# Patient Record
Sex: FEMALE | Race: WHITE | NOT HISPANIC OR LATINO | Employment: FULL TIME | URBAN - METROPOLITAN AREA
[De-identification: names, ages, dates, MRNs, and addresses within clinical notes are randomized per-mention and may not be internally consistent; named-entity substitution may affect disease eponyms.]

---

## 2017-02-17 ENCOUNTER — ALLSCRIPTS OFFICE VISIT (OUTPATIENT)
Dept: OTHER | Facility: OTHER | Age: 22
End: 2017-02-17

## 2017-03-01 ENCOUNTER — GENERIC CONVERSION - ENCOUNTER (OUTPATIENT)
Dept: OTHER | Facility: OTHER | Age: 22
End: 2017-03-01

## 2017-12-27 ENCOUNTER — GENERIC CONVERSION - ENCOUNTER (OUTPATIENT)
Dept: OTHER | Facility: OTHER | Age: 22
End: 2017-12-27

## 2018-01-11 NOTE — PROGRESS NOTES
Assessment    1  Encounter for administrative examinations (V68 9) (Z02 9)   2  BMI 33 0-33 9,adult (V85 33) (Z68 33)   3  Birth control counseling (V25 09) (Z30 9)    Plan   BMI 33 0-33 9,adult    · (1) GLUCOSE,  FASTING; Status:Active; Requested for:2016;    · (1) LIPID PANEL, FASTING; Status:Active; Requested for:2016;     Tubersol 5 UNIT/0 1ML Intradermal Solution; INJECT 0 1  ML Intradermal; Dose: 0 1; Route: Intradermal; Site: Left Forearm; Done: [id number] 10:06AM; Status: Complete - Retrospective By Protocol Authorization;  Ordered  For: Encounter for PPD test, Screening examination for pulmonary tuberculosis; Ordered By:Lara Suarez; Effective Date:2016; Administered by: Scott Rodrigues: 2016 10:06:00 AM; Last Updated By: Scott Rodrigues; 2016 2:34:17 PM     Discussion/Summary  health maintenance visit Currently, she eats an adequate diet  Had pap test in  when pregnant which was negative Breast cancer screening: breast cancer screening is not indicated  Colorectal cancer screening: colorectal cancer screening is not indicated  Osteoporosis screening: bone mineral density testing is not indicated  Screening lab work includes glucose and lipid profile  Advice and education were given regarding nutrition, aerobic exercise and weight loss  Work physical- gave PPD test will return on Monday to be checked  Obesity- ordered lipid panel and fasting glucose  Brith control counseling- discussed the different options including OCPs (patient states can't remember to take daily), depo shot and IUDs, provided sheet with different options and will make follow up appointment to discuss which one she chooses        Chief Complaint  PT is here for CPE and to discuss birth control      History of Present Illness  HPI: 23year old obese  with no significant past medical history who presents for work physical exam as 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  She has regular menstrual cycles, usually lasting 5 days  She is not on any contraception and is interested in discussing her options  Review of Systems    Constitutional: no fever and no chills  ENT: no earache  Cardiovascular: no chest pain and no palpitations  Respiratory: no shortness of breath, no cough and no wheezing  Gastrointestinal: no abdominal pain, no nausea, no vomiting and no diarrhea  Genitourinary: no dysuria  Musculoskeletal: no arthralgias and no myalgias  Integumentary: no rashes  Neurological: no headache, no numbness, no tingling and no dizziness  Active Problems    1  Birth control counseling (V25 09) (Z30 9)   2  Drug screening, pre-employment (V70 5) (Z02 1)   3  Encounter for PPD test (V74 1) (Z11 1)   4  Flu vaccine need (V04 81) (Z23)   5   Obesity (278 00) (E66 9)    Past Medical History    · History of 12 weeks gestation of pregnancy (V22 2) (Z3A 12)   · History of Abnormal placenta, antepartum (656 73) (O43 899)   · History of nausea and vomiting (V12 79) (E03 085)   · History of streptococcal pharyngitis (V12 09) (Z87 09)   · History of Need for diphtheria-tetanus-pertussis (Tdap) vaccine, adult/adolescent (V06 1)  (Z23)   · History of Need for influenza vaccination (V04 81) (Z23)   · History of Normal pregnancy (V22 2) (Z34 90)   · History of Postpartum exam (V24 2) (Z39 2)   · History of Pregnancy (V22 2) (Z33 1)   · History of Sore throat (462) (J02 9)   · History of Status post  (V45 89) (Z98 89)   · History of UTI (lower urinary tract infection) (599 0) (N39 0)    Surgical History    · History of  Section Low Transverse    Family History    · Family history of epilepsy (V17 2) (Z82 0)   · Family history of malignant neoplasm (V16 9) (Z80 9)    · Family history of asthma (V17 5) (Z82 5)    · Family history of asthma (V17 5) (Z82 5)    · Family history of epilepsy (V17 2) (Z82 0)   · Family history of malignant neoplasm (V16 9) (Z80 9)    · Family history of epilepsy (V17 2) (Z82 0)   · Family history of malignant neoplasm (V16 9) (Z80 9)    · Family history of epilepsy (V17 2) (Z82 0)    Social History    · Age of onset of sexual intercourse   · age 11   · History of unprotected sex (V79 4) (Z68 48)   · Never a smoker   · Non-smoker (V49 89) (Z78 9)   · Sexual history   · Sexual History: Has a single partner   · Sexually active with members of opposite gender   · Usually uses condoms for sexual activity    Allergies    1  No Known Drug Allergies    2  No Known Environmental Allergies   3  No Known Food Allergies   4  No Known Latex Allergies    Vitals   Recorded: [id number] 09:28AM   Temperature 94 3 F   Heart Rate 97   Respiration 20   Systolic 98   Diastolic 70   Height 5 ft 7 in   Weight 212 lb    BMI Calculated 33 2   BSA Calculated 2 07   O2 Saturation 100     Physical Exam    Constitutional   General appearance: No acute distress, well appearing and well nourished  Head and Face   Head and face: Normal     Eyes   Conjunctiva and lids: No swelling, erythema or discharge  Pupils and irises: Equal, round, reactive to light  Ears, Nose, Mouth, and Throat   External inspection of ears and nose: Normal     Lips, teeth, and gums: Normal, good dentition  Oropharynx: Normal with no erythema, edema, exudate or lesions  Pulmonary   Respiratory effort: No increased work of breathing or signs of respiratory distress  Auscultation of lungs: Clear to auscultation  Cardiovascular   Auscultation of heart: Normal rate and rhythm, normal S1 and S2, no murmurs  Examination of extremities for edema and/or varicosities: Normal     Abdomen   Abdomen: Non-tender, no masses  The abdomen was obese  The abdomen was soft and nontender  The abdomen was not firm and not rigid  No rebound tenderness  No guarding  no CVA tenderness   Liver and spleen: No hepatomegaly or splenomegaly      Skin   Skin and subcutaneous tissue: Normal without rashes or lesions  Psychiatric   Orientation to person, place, and time: Normal     Mood and affect: Normal        Attending Note  Attending Note: Attending Note: I did not interview and examine the patient, I discussed the case with the Resident and reviewed the Resident's note and I agree with the Resident management plan as it was presented to me  Level of Participation: I was present in clinic, but did not examine the patient  I agree with the Resident's note  Future Appointments    Date/Time Provider Specialty Site   04/20/2016 09:30 AM THELMA Boyce  Family Medicine [facility]     Signatures   Electronically signed by : Sukh Guzman DO; Feb 27 2016  6:49PM EST                       (Author)    Electronically signed by : Moraima Tian DO;  Apr 1 2016  9:45AM EST                       (Author)

## 2018-01-12 VITALS
TEMPERATURE: 98.4 F | SYSTOLIC BLOOD PRESSURE: 120 MMHG | RESPIRATION RATE: 18 BRPM | DIASTOLIC BLOOD PRESSURE: 64 MMHG | HEIGHT: 66 IN | BODY MASS INDEX: 39.53 KG/M2 | OXYGEN SATURATION: 96 % | HEART RATE: 80 BPM | WEIGHT: 246 LBS

## 2018-01-13 NOTE — MISCELLANEOUS
Provider Comments  Provider Comments:   L/M for pt to call to R/S missed apt  CE      Signatures   Electronically signed by :  Randi Saeed DO; Mar  1 2017 12:33PM EST                       (Author)

## 2018-01-23 NOTE — MISCELLANEOUS
Message   Recorded as Task   Date: 12/26/2017 09:11 AM, Created By: Georgia Maxwell   Task Name: Medical Complaint Callback   Assigned To: Mitch Rogers   Regarding Patient: Timo Landa, Status: In Progress   Comment:    Adriane Ferrer - 26 Dec 2017 9:11 AM     TASK CREATED  Pt had nexplanon inserted in August 2016, periods stopped in February 2017, got a period in November 2017,  and now has had period for 12 days this month  she would like a call back, she wants to know what is going on   [facility] - 26 Dec 2017 9:11 AM     TASK IN [address] [place] [address] 15 - 27 Dec 2017 10:48 AM     TASK EDITED  called patient she is concerned that she had regular periods for 6 months after nexplanon insertion then it stopped in February 2017, in November 2017 bleeding started again and she had it 3 times in the past 1 and 1/2 months , for the past 12 days she has had spotting which is light and more annoying than anything no cramping and denies any signs of vaginal infection , told her this sounds normal but i would ask you if there are any further advise or if she should be seen thanks! Marti Eastern - 27 Dec 2017 10:49 AM     TASK REASSIGNED: Previously Assigned To Mitch Carmona - 27 Dec 2017 5:12 PM     TASK REPLIED TO: Previously Assigned To Mitch Rogers  Called patient to discuss with her complaints  Patient reports had the Nexplanon inserted in August 2016 and her periods stopped until February 2017 when she got her period  She had for one week and then her bleeding stopped  Patient did not bleed up until November 2017 at which time she had a period for about 12 days  Patient denies any cramping, fevers, vaginal discharge  Patient reports lost her fiance approximately year ago during this time and has some significant life stressors  Patient also asked about cervical malignancy  Patient reports had a Gardasil placed    Discussed with patient her last Pap smear was done in 2014 of May which was normal   Discussed with patient to  schedule for a Pap smear asap  Patient reports her insurance will be effective after January 1, 2018  Discussed with patient to schedule appointment with me  Provided patient with my name and provided her with the phone number  Patient reports will call in the morning to schedule appointment  Discussed with patient that the bleeding is normal   All questions answered       Signatures   Electronically signed by : THELMA Mosquera ; Dec 27 2017  5:12PM EST                       (Author)    Electronically signed by : THELMA Gustafson ; Dec 27 2017  6:58PM EST                       (Co-author)

## 2018-03-19 ENCOUNTER — OFFICE VISIT (OUTPATIENT)
Dept: FAMILY MEDICINE CLINIC | Facility: CLINIC | Age: 23
End: 2018-03-19
Payer: COMMERCIAL

## 2018-03-19 VITALS
DIASTOLIC BLOOD PRESSURE: 70 MMHG | BODY MASS INDEX: 41.81 KG/M2 | WEIGHT: 261 LBS | HEART RATE: 76 BPM | TEMPERATURE: 97.7 F | SYSTOLIC BLOOD PRESSURE: 110 MMHG | RESPIRATION RATE: 16 BRPM

## 2018-03-19 DIAGNOSIS — E66.01 MORBID OBESITY (HCC): Primary | ICD-10-CM

## 2018-03-19 DIAGNOSIS — Z12.4 ENCOUNTER FOR PAPANICOLAOU SMEAR FOR CERVICAL CANCER SCREENING: ICD-10-CM

## 2018-03-19 DIAGNOSIS — Z01.419 VISIT FOR GYNECOLOGIC EXAMINATION: ICD-10-CM

## 2018-03-19 PROCEDURE — 99213 OFFICE O/P EST LOW 20 MIN: CPT | Performed by: FAMILY MEDICINE

## 2018-03-19 NOTE — PROGRESS NOTES
Assessment/Plan:    No problem-specific Assessment & Plan notes found for this encounter  Diagnoses and all orders for this visit:    Morbid obesity ([facility] [address] )    Encounter for Papanicolaou smear for cervical cancer screening  -     Cancel: Pap Lb, Ct-Ng, rfx HPV ASCU  -     Cancel: Genital Comprehensive Culture  -     Cancel: Genital Comprehensive Culture  -     Pap Lb, Ct-Ng, rfx HPV ASCU  -     Genital Comprehensive Culture    Visit for gynecologic examination    Other orders  -     Etonogestrel (Joyice Needle) 76 MG IMPL; Inject under the skin        # encounter for gyn visit with Pap and pelvic  - no physical abnormalities note on examination today   - and breast exam were done today in the office with chaperone which revealed no abnormalities   -also did a Pap smear today and will follow up with results  Discussed with patient will call once the results are available  Also discussed with patient she may come to the office in  a report if she warrants  -counseled patient on safe sex techniques is use condoms each and every time  -patient reports previously has been tested for it STI an HIV which was negative in 2014  Patient does not want to be tested at this time  -discussed with patient should obtain baseline bloodwork by obtaining CMP and fasting lipid panel, patient refuses as there is no family history  Therefore will readdress at next visit    # BMI: >41k/gm2  -morbid obesity   - Counseled patient on lifestyle modifications and moderate intensity activity of at least 150 minutes/week  - discussed decreased consumption of saturated fats, simple sugars and salt  - discussed ensuring adequate amounts of clear fluids, low fat milk products, fruits and vegetables, whole grains, mono and polyunsaturated fats  - discussed decreased consumption of saturated fats, simple sugars and salt     # RTO in one year for annual CPE or PRN for other complaints      Subjective:      Patient ID: Nydia Batista is a 20 y o  female with no significant past medical history who is here today for a gyn exam with Pap and pelvic  Patient reports is doing well with no complaints  Social history:  Patient denies smoking, illicit drug use, social alcohol; 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; 1 cat in home  Gyn history:  Patient reports last menstrual period was 2018 which last for 5 days, patient has Nexplanon implanted in 2016, no bleeding until 2017; irregular bleeding; last pap was done in  May 2014 which was normal as per patient  OB history: ; c/section due to decrease fetal tones; no maternal complications  Sexual history: current active, uses condoms each and every time, prefers heterosexual partners, no hx of STI or HIV  Tetanus shot:   Flu shot: in 2017  HPV shot: completed series while in high school  Family history:  Asthma, seizures, and history of cancer    HPI    The following portions of the patient's history were reviewed and updated as appropriate:   She  has a past medical history of 12 weeks gestation of pregnancy; Abnormal placenta, antepartum; and Status post  section  She   Patient Active Problem List    Diagnosis Date Noted    Visit for gynecologic examination 2018    Morbid obesity ([facility] [address] ) 2017     She  has a past surgical history that includes  section, low transverse (2014)  Her family history includes Asthma in her brother and father; Cancer in her maternal grandmother, mother, and paternal grandmother; Seizures in her family, maternal grandmother, mother, and paternal grandmother  She  reports that she has never smoked  She does not have any smokeless tobacco history on file  She reports that she does not drink alcohol or use drugs    Current Outpatient Prescriptions   Medication Sig Dispense Refill    Etonogestrel (NEXPLANON) 76 MG IMPL Inject under the skin       No current facility-administered medications for this visit  No current outpatient prescriptions on file prior to visit  No current facility-administered medications on file prior to visit  She has No Known Allergies       Review of Systems   Constitutional: Negative for activity change, chills and fever  HENT: Negative for ear pain, nosebleeds, rhinorrhea and sore throat  Eyes: Negative for pain, redness and visual disturbance  Respiratory: Negative for cough, shortness of breath and wheezing  Cardiovascular: Negative for chest pain and leg swelling  Gastrointestinal: Negative for abdominal pain, diarrhea, nausea and vomiting  Endocrine: Negative for polydipsia and polyuria  Genitourinary: Negative for dysuria and hematuria  Musculoskeletal: Negative for joint swelling and myalgias  Skin: Negative for rash and wound  Neurological: Negative for dizziness, weakness, numbness and headaches  Psychiatric/Behavioral: Negative for dysphoric mood, sleep disturbance and suicidal ideas  The patient is not nervous/anxious  Objective:      /70 (BP Location: Left arm, Patient Position: Sitting, Cuff Size: Extra-Large)   Pulse 76   Temp 97 7 °F (36 5 °C) (Tympanic)   Resp 16   Wt 118 kg (261 lb)   LMP 03/12/2018 (Exact Date)   Breastfeeding? No   BMI 41 81 kg/m²          Physical Exam   Constitutional: She is oriented to person, place, and time  She appears well-developed and well-nourished  No distress  HENT:   Head: Normocephalic and atraumatic  Eyes: Right eye exhibits no discharge  Left eye exhibits no discharge  Neck: Neck supple  Cardiovascular: Normal rate, regular rhythm and normal heart sounds  No murmur heard  Pulmonary/Chest: Effort normal and breath sounds normal  No respiratory distress  She has no wheezes  She has no rales  Abdominal: Soft  Bowel sounds are normal  She exhibits no distension  There is no tenderness     Obese, unable to ascertain for masses 2/2 to body habitus Genitourinary: Rectum normal, vagina normal and uterus normal  No breast swelling, tenderness, discharge or bleeding  No labial fusion  There is no rash, tenderness, lesion or injury on the right labia  There is no rash, tenderness, lesion or injury on the left labia  Cervix exhibits no motion tenderness, no discharge and no friability  Right adnexum displays no mass  Left adnexum displays no mass  Neurological: She is alert and oriented to person, place, and time  No cranial nerve deficit  Skin: Skin is dry  No rash noted  She is not diaphoretic  No erythema  No pallor  Psychiatric: She has a normal mood and affect  Her behavior is normal  Judgment and thought content normal    Nursing note and vitals reviewed          Lashawn Rodriguez

## 2018-03-22 LAB
BACTERIA GENITAL AEROBE CULT: NORMAL
Lab: NORMAL

## 2018-03-23 LAB
C TRACH RRNA CVX QL NAA+PROBE: NEGATIVE
CYTOLOGIST CVX/VAG CYTO: ABNORMAL
DX ICD CODE: ABNORMAL
DX ICD CODE: ABNORMAL
HPV I/H RISK 1 DNA CVX QL PROBE+SIG AMP: POSITIVE
N GONORRHOEA RRNA CVX QL NAA+PROBE: NEGATIVE
OTHER STN SPEC: ABNORMAL
OTHER STN SPEC: ABNORMAL
PATH REPORT.FINAL DX SPEC: ABNORMAL
PATHOLOGIST CVX/VAG CYTO: ABNORMAL
RECOM F/U CVX/VAG CYTO: ABNORMAL
SL AMB NOTE:: ABNORMAL
SL AMB SPECIMEN ADEQUACY: ABNORMAL

## 2018-03-26 ENCOUNTER — TELEPHONE (OUTPATIENT)
Dept: FAMILY MEDICINE CLINIC | Facility: CLINIC | Age: 23
End: 2018-03-26

## 2018-03-26 NOTE — TELEPHONE ENCOUNTER
Called patient back as she does had questionsregarding her Pap  Smear results which was discussed earlier this morning with the patient that she will need a repeat Pap smear in one year as it showed ASCUS with positive HPV  Discussed with patient not to be concerned and will do repeat pap in one year  All questions answered

## 2018-05-04 ENCOUNTER — OFFICE VISIT (OUTPATIENT)
Dept: FAMILY MEDICINE CLINIC | Facility: CLINIC | Age: 23
End: 2018-05-04
Payer: COMMERCIAL

## 2018-05-04 VITALS
BODY MASS INDEX: 42.13 KG/M2 | HEART RATE: 97 BPM | RESPIRATION RATE: 16 BRPM | SYSTOLIC BLOOD PRESSURE: 125 MMHG | DIASTOLIC BLOOD PRESSURE: 80 MMHG | OXYGEN SATURATION: 99 % | WEIGHT: 263 LBS | TEMPERATURE: 98.2 F

## 2018-05-04 DIAGNOSIS — F41.9 ANXIETY: Primary | ICD-10-CM

## 2018-05-04 DIAGNOSIS — Z56.6 STRESS AT WORK: ICD-10-CM

## 2018-05-04 PROCEDURE — 99213 OFFICE O/P EST LOW 20 MIN: CPT | Performed by: FAMILY MEDICINE

## 2018-05-04 SDOH — HEALTH STABILITY - MENTAL HEALTH: OTHER PHYSICAL AND MENTAL STRAIN RELATED TO WORK: Z56.6

## 2018-05-04 NOTE — LETTER
May 4, 2018     Patient: Georgi Silver   YOB: 1995   Date of Visit: 5/4/2018       To Whom it May Concern:    Georgi Silver is under my professional care  She was seen in my office on 5/4/2018  In my medical opinion, she should not return to work immediately  She may return to work on 05/21/2018  If you have any questions or concerns, please don't hesitate to call           Sincerely,          Josafat Ariraza MD

## 2018-05-04 NOTE — PROGRESS NOTES
Assessment/Plan:     Diagnoses and all orders for this visit:    Anxiety    Stress at work    BMI 40 0-44 9, adult [facility])        Romayne Milian appears to be experiencing significant stress at her workplace with possible concern for unreasonable work demands and even violation of wage laws  She was very tearful at today's visit  She does enjoy the type of work she does, but is not happy with the workplace demands and environment  I was agreeable to give her two weeks off from work due to the symptoms, stress and anxiety she is currently experiencing with her work situation  Instructed her to use the time off as a opportunity for re-evaluation of her workplace goals and consideration of finding another job, if that would be a better fit  Also recommended establishing care with [facility] for counseling and possible ways to help cope with stressors and management of colleagues at the workplace  Lastly, I advised that she report any violations of wage laws to a higher authority if she is not getting paid for work she is performing  She acknowledged understanding and agreement with the plan  Subjective:      Patient ID: Ritu Rivera is a 20 y o  female  HPI     Romayne Milian is a 20year old female that comes to the office due to concerns of anxiety and stress related at work  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[employer], 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  She reports that she is an hourly employee and is routinely asked to come into work to complete tasks and help cover since they are short-staffed at the current time  When asked if she "punches in" to clock her hours, she stated that she's been told that she will get in trouble if she does  She feels overworked and is having symptoms of anxiety associated with chest tightness and panic episodes due to the work environment and stress placed on her by her superiors   She has tried to request time off and has even approached human resources, but reports that she has been denied and not getting cooperation from them  She also reports difficulty sleeping at night and even has some days where she only gets two hours and has to go into work  Also reports decreased concentration  She enjoys her job, but is stressed with the work situation  She is having difficulty taking care of her young son due to her work demands  She is able to get help from her sister and mother, when they can  Her boyfriend passed away from drug overdose approximately 1 year ago  Denies suicidal or homicidal ideation  States that she's constantly crying because of the work situation  She states that she has 8 weeks of sick and vacation days and has been notified that the only way she get time off is from a letter from a doctor  The following portions of the patient's history were reviewed and updated as appropriate: allergies, current medications, past family history, past medical history, past social history, past surgical history and problem list     Review of Systems   Constitutional: Negative for chills and fever  HENT: Negative for congestion, rhinorrhea and sore throat  Eyes: Negative for visual disturbance  Respiratory: Positive for chest tightness  Negative for cough, shortness of breath and wheezing  Cardiovascular: Positive for palpitations  Negative for chest pain  Gastrointestinal: Negative for abdominal pain, constipation, diarrhea, nausea and vomiting  Neurological: Negative for weakness, numbness and headaches  Psychiatric/Behavioral: Positive for decreased concentration and sleep disturbance  Negative for confusion, self-injury and suicidal ideas  The patient is nervous/anxious  Objective:      /80 (BP Location: Right arm, Patient Position: Sitting, Cuff Size: Extra-Large)   Pulse 97   Temp 98 2 °F (36 8 °C) (Tympanic)   Resp 16   Wt 119 kg (263 lb)   LMP 04/20/2018 (Approximate)   SpO2 99%   Breastfeeding?  No   BMI 42 13 kg/m²          Physical Exam   Constitutional: She is oriented to person, place, and time  She appears well-developed and well-nourished  No distress  Morbidly obese   HENT:   Head: Normocephalic and atraumatic  Right Ear: External ear normal    Left Ear: External ear normal    Eyes: Conjunctivae and EOM are normal  Pupils are equal, round, and reactive to light  Right eye exhibits no discharge  Left eye exhibits no discharge  Neck: Neck supple  Cardiovascular: Normal rate, regular rhythm and normal heart sounds  No murmur heard  Pulmonary/Chest: Effort normal and breath sounds normal  No respiratory distress  She has no wheezes  Abdominal: Soft  Bowel sounds are normal  There is no tenderness  Musculoskeletal: Normal range of motion  She exhibits no edema or tenderness  Neurological: She is alert and oriented to person, place, and time  Skin: Skin is warm and dry  She is not diaphoretic     Psychiatric:   Tearful at today's appointment

## 2018-05-11 ENCOUNTER — HOSPITAL ENCOUNTER (EMERGENCY)
Facility: HOSPITAL | Age: 23
Discharge: HOME | End: 2018-05-11
Attending: EMERGENCY MEDICINE
Payer: COMMERCIAL

## 2018-05-11 VITALS
HEART RATE: 79 BPM | SYSTOLIC BLOOD PRESSURE: 130 MMHG | RESPIRATION RATE: 18 BRPM | TEMPERATURE: 97.1 F | OXYGEN SATURATION: 99 % | DIASTOLIC BLOOD PRESSURE: 79 MMHG

## 2018-05-11 DIAGNOSIS — K04.7 DENTAL ABSCESS: Primary | ICD-10-CM

## 2018-05-11 PROCEDURE — 99282 EMERGENCY DEPT VISIT SF MDM: CPT

## 2018-05-11 RX ORDER — NAPROXEN 500 MG/1
500 TABLET ORAL ONCE
Status: COMPLETED | OUTPATIENT
Start: 2018-05-11 | End: 2018-05-11

## 2018-05-11 RX ORDER — NAPROXEN 500 MG/1
500 TABLET ORAL 2 TIMES DAILY WITH MEALS
Qty: 20 TABLET | Refills: 0 | Status: SHIPPED | OUTPATIENT
Start: 2018-05-11 | End: 2018-06-04

## 2018-05-11 RX ORDER — AMOXICILLIN AND CLAVULANATE POTASSIUM 875; 125 MG/1; MG/1
1 TABLET, FILM COATED ORAL ONCE
Status: COMPLETED | OUTPATIENT
Start: 2018-05-11 | End: 2018-05-11

## 2018-05-11 RX ORDER — AMOXICILLIN AND CLAVULANATE POTASSIUM 875; 125 MG/1; MG/1
1 TABLET, FILM COATED ORAL EVERY 12 HOURS
Qty: 14 TABLET | Refills: 0 | Status: SHIPPED | OUTPATIENT
Start: 2018-05-11 | End: 2018-05-18

## 2018-05-11 RX ADMIN — NAPROXEN 500 MG: 500 TABLET ORAL at 13:16

## 2018-05-11 RX ADMIN — AMOXICILLIN AND CLAVULANATE POTASSIUM 1 TABLET: 875; 125 TABLET, FILM COATED ORAL at 13:16

## 2018-05-11 NOTE — DISCHARGE INSTRUCTIONS
Please take a list of all of your medications and discharge paperwork with you to all of your follow-up medical visits  Please take all of your medications as directed  Please call your family doctor or return to the ER if you have increased shortness of breath, chest pain, fevers, chills, nausea, vomiting, diarrhea, or any other worsening symptoms  Dental Abscess   WHAT YOU NEED TO KNOW:   A dental abscess is a collection of pus in or around a tooth  A dental abscess is caused by bacteria  The bacteria usually enter the tooth when the enamel (outer part of the tooth) is damaged by tooth decay  Bacteria may also enter the tooth through a break or chip in the tooth, or a cut in the gum  Food particles that are stuck between the teeth for a long time may also lead to an abscess  DISCHARGE INSTRUCTIONS:   Return to the emergency department if:   · You have severe pain  · You have trouble breathing because of pain or swelling  Contact your healthcare provider if:   · Your symptoms get worse, even after treatment  · Your mouth is bleeding  · You cannot eat or drink because of pain or swelling  · Your abscess returns  · You have an injury that causes a crack in your tooth  · You have questions or concerns about your condition or care  Medicines: You may  need any of the following:  · Antibiotics  help treat a bacterial infection  · NSAIDs , such as ibuprofen, help decrease swelling, pain, and fever  This medicine is available with or without a doctor's order  NSAIDs can cause stomach bleeding or kidney problems in certain people  If you take blood thinner medicine, always ask your healthcare provider if NSAIDs are safe for you  Always read the medicine label and follow directions  · Acetaminophen  decreases pain and fever  It is available without a doctor's order  Ask how much to take and how often to take it  Follow directions   Read the labels of all other medicines you are using to see if they also contain acetaminophen, or ask your doctor or pharmacist  Acetaminophen can cause liver damage if not taken correctly  Do not use more than 4 grams (4,000 milligrams) total of acetaminophen in one day  · Prescription pain medicine  may be given  Ask your healthcare provider how to take this medicine safely  Some prescription pain medicines contain acetaminophen  Do not take other medicines that contain acetaminophen without talking to your healthcare provider  Too much acetaminophen may cause liver damage  Prescription pain medicine may cause constipation  Ask your healthcare provider how to prevent or treat constipation  · Take your medicine as directed  Contact your healthcare provider if you think your medicine is not helping or if you have side effects  Tell him of her if you are allergic to any medicine  Keep a list of the medicines, vitamins, and herbs you take  Include the amounts, and when and why you take them  Bring the list or the pill bottles to follow-up visits  Carry your medicine list with you in case of an emergency  Self-care:   · Rinse your mouth every 2 hours with salt water  This will help keep the area clean  · Gently brush your teeth twice a day with a soft tooth brush  This will help keep the area clean  · Eat soft foods as directed  Soft foods may cause less pain  Examples include applesauce, yogurt, and cooked pasta  Ask your healthcare provider how long to follow this instruction  · Apply a warm compress to your tooth or gum  Use a cotton ball or gauze soaked in warm water  Remove the compress in 10 minutes or when it becomes cool  Repeat 3 times a day  Prevent another abscess:   · Brush your teeth at least 2 times a day with fluoride toothpaste  · Use dental floss to clean between your teeth at least once a day  · Rinse your mouth with water or mouthwash after meals and snacks  · Chew sugarless gum after meals and snacks      · Limit foods that are sticky and high in sugar such as raisons  Also limit drinks high in sugar, such as soda  · See your dentist every 6 months for dental cleanings and oral exams  Follow up with your healthcare provider in 24 hours: Your healthcare provider will need to check your teeth and gums  Write down your questions so you remember to ask them during your visits  © 2017 [employer] Information is for End User's use only and may not be sold, redistributed or otherwise used for commercial purposes  All illustrations and images included in CareNotes® are the copyrighted property of [employer]  or [employer]  The above information is an  only  It is not intended as medical advice for individual conditions or treatments  Talk to your doctor, nurse or pharmacist before following any medical regimen to see if it is safe and effective for you

## 2018-05-11 NOTE — ED PROVIDER NOTES
History  Chief Complaint   Patient presents with    Dental Pain     pt present to the ed with dental pain in her left lower wisdom tooth  pt states that she has a dentist apointment coming up soon      25-year-old female presents to the ER with complaint of left lower jaw pain for the past 3-4 days  Patient states that she had a broken molar to that region which now she thinks is infected  Patient denies any fevers, chills, nausea, vomiting, diarrhea, dysuria, facial swelling  Patient is taking Tylenol for pain which is not helping  Patient does have an appointment with her dentist next week  Patient called her dentist today and was told to come to the ER for further evaluation  History provided by:  Patient  Dental Pain   Associated symptoms: no congestion, no fever and no headaches        Prior to Admission Medications   Prescriptions Last Dose Informant Patient Reported? Taking? Etonogestrel (NEXPLANON) 76 MG IMPL   Yes No   Sig: Inject under the skin      Facility-Administered Medications: None       Past Medical History:   Diagnosis Date    12 weeks gestation of pregnancy     RESOLVED: 1/8/15    Abnormal placenta, antepartum     RESOLVED: 14    Status post  section     RESOLVED: 16       Past Surgical History:   Procedure Laterality Date     SECTION, LOW TRANSVERSE  2014    LAST ASSESSED: 1/8/15       Family History   Problem Relation Age of Onset    Seizures Mother      EPILEPSY    Cancer Mother     Asthma Father     Asthma Brother     Seizures Maternal Grandmother      EPILEPSY    Cancer Maternal Grandmother     Seizures Paternal Grandmother      EPILEPSY    Cancer Paternal Grandmother     Seizures Family      EPILEPSY     I have reviewed and agree with the history as documented      Social History   Substance Use Topics    Smoking status: Never Smoker    Smokeless tobacco: Not on file    Alcohol use No        Review of Systems   Constitutional: Negative for activity change, appetite change, chills and fever  HENT: Positive for dental problem  Negative for congestion and ear pain  Eyes: Negative for pain and discharge  Respiratory: Negative for cough, chest tightness, shortness of breath, wheezing and stridor  Cardiovascular: Negative for chest pain and palpitations  Gastrointestinal: Negative for abdominal distention, abdominal pain, constipation, diarrhea and nausea  Endocrine: Negative for cold intolerance  Genitourinary: Negative for dysuria, frequency and urgency  Musculoskeletal: Negative for arthralgias and back pain  Skin: Negative for color change and rash  Allergic/Immunologic: Negative for environmental allergies and food allergies  Neurological: Negative for dizziness, weakness, numbness and headaches  Hematological: Negative for adenopathy  Psychiatric/Behavioral: Negative for agitation, behavioral problems and confusion  The patient is not nervous/anxious  All other systems reviewed and are negative  Physical Exam  ED Triage Vitals [05/11/18 1234]   Temperature Pulse Respirations Blood Pressure SpO2   (!) 97 1 °F (36 2 °C) 79 18 130/79 99 %      Temp Source Heart Rate Source Patient Position - Orthostatic VS BP Location FiO2 (%)   Tympanic Monitor -- -- --      Pain Score       --           Orthostatic Vital Signs  Vitals:    05/11/18 1234   BP: 130/79   Pulse: 79       Physical Exam   Constitutional: She is oriented to person, place, and time  She appears well-developed and well-nourished  HENT:   Head: Normocephalic and atraumatic  Mouth/Throat: Oropharynx is clear and moist    Right lower 3rd molar appears to be broken  Mild swelling and induration noted to the gum  Eyes: Conjunctivae and EOM are normal    Neck: Normal range of motion  Neck supple  Cardiovascular: Normal rate, regular rhythm, normal heart sounds and intact distal pulses      Pulmonary/Chest: Effort normal and breath sounds normal  Abdominal: Soft  Bowel sounds are normal  She exhibits no distension  There is no tenderness  Musculoskeletal: Normal range of motion  Neurological: She is alert and oriented to person, place, and time  Skin: Skin is warm and dry  Psychiatric: She has a normal mood and affect  Her behavior is normal  Judgment and thought content normal    Nursing note and vitals reviewed  ED Medications  Medications   amoxicillin-clavulanate (AUGMENTIN) 875-125 mg per tablet 1 tablet (not administered)   naproxen (NAPROSYN) tablet 500 mg (not administered)       Diagnostic Studies  Results Reviewed     None                 No orders to display              Procedures  Procedures       Phone Contacts  ED Phone Contact    ED Course                               MDM  Number of Diagnoses or Management Options  Dental abscess:   Risk of Complications, Morbidity, and/or Mortality  General comments: Patient presenting with dental abscess  18 gauge needle was used to puncture the indurated region of the gum at the site of right lower 3rd molar area with only blood noted  Patient is subsequently placed on Augmentin and p r n  NSAIDs with follow-up to dentist as scheduled  Close return instructions given to return to the ER for any worsening symptoms  Patient agrees with discharge plan  Patient well appearing at time of discharge  Patient Progress  Patient progress: stable    CritCare Time    Disposition  Final diagnoses:   Dental abscess     Time reflects when diagnosis was documented in both MDM as applicable and the Disposition within this note     Time User Action Codes Description Comment    5/11/2018 12:40 PM Russ Driver Add [K04 7] Dental abscess       ED Disposition     ED Disposition Condition Comment    Discharge  Georgi Silver discharge to home/self care      Condition at discharge: Good        Follow-up Information     Follow up With Specialties Details Why Contact Info        Follow-up with her dentist as scheduled next week  Patient's Medications   Discharge Prescriptions    AMOXICILLIN-CLAVULANATE (AUGMENTIN) 875-125 MG PER TABLET    Take 1 tablet by mouth every 12 (twelve) hours for 7 days       Start Date: 5/11/2018 End Date: 5/18/2018       Order Dose: 1 tablet       Quantity: 14 tablet    Refills: 0    NAPROXEN (EC NAPROSYN) 500 MG EC TABLET    Take 1 tablet (500 mg total) by mouth 2 (two) times a day with meals       Start Date: 5/11/2018 End Date: --       Order Dose: 500 mg       Quantity: 20 tablet    Refills: 0     No discharge procedures on file      ED Provider  Electronically Signed by           Sandy Barriga DO  05/11/18 7199

## 2018-06-04 ENCOUNTER — HOSPITAL ENCOUNTER (EMERGENCY)
Facility: HOSPITAL | Age: 23
Discharge: HOME | End: 2018-06-04
Attending: EMERGENCY MEDICINE | Admitting: EMERGENCY MEDICINE
Payer: COMMERCIAL

## 2018-06-04 ENCOUNTER — TELEPHONE (OUTPATIENT)
Dept: FAMILY MEDICINE CLINIC | Facility: CLINIC | Age: 23
End: 2018-06-04

## 2018-06-04 VITALS
WEIGHT: 265 LBS | HEART RATE: 89 BPM | RESPIRATION RATE: 18 BRPM | BODY MASS INDEX: 42.45 KG/M2 | SYSTOLIC BLOOD PRESSURE: 111 MMHG | OXYGEN SATURATION: 100 % | DIASTOLIC BLOOD PRESSURE: 68 MMHG | TEMPERATURE: 97.1 F

## 2018-06-04 DIAGNOSIS — R55 VASOVAGAL SYNCOPE: Primary | ICD-10-CM

## 2018-06-04 LAB
ANION GAP SERPL CALCULATED.3IONS-SCNC: 10 MMOL/L (ref 4–13)
BASOPHILS # BLD AUTO: 0.03 THOUSANDS/ΜL (ref 0–0.1)
BASOPHILS NFR BLD AUTO: 0 % (ref 0–1)
BUN SERPL-MCNC: 9 MG/DL (ref 5–25)
CALCIUM SERPL-MCNC: 9 MG/DL (ref 8.3–10.1)
CHLORIDE SERPL-SCNC: 105 MMOL/L (ref 100–108)
CO2 SERPL-SCNC: 23 MMOL/L (ref 21–32)
CREAT SERPL-MCNC: 0.7 MG/DL (ref 0.6–1.3)
EOSINOPHIL # BLD AUTO: 0.11 THOUSAND/ΜL (ref 0–0.61)
EOSINOPHIL NFR BLD AUTO: 1 % (ref 0–6)
ERYTHROCYTE [DISTWIDTH] IN BLOOD BY AUTOMATED COUNT: 12.5 % (ref 11.6–15.1)
GFR SERPL CREATININE-BSD FRML MDRD: 123 ML/MIN/1.73SQ M
GLUCOSE SERPL-MCNC: 82 MG/DL (ref 65–140)
HCT VFR BLD AUTO: 47.6 % (ref 34.8–46.1)
HGB BLD-MCNC: 14.6 G/DL (ref 11.5–15.4)
IMM GRANULOCYTES # BLD AUTO: 0.02 THOUSAND/UL (ref 0–0.2)
IMM GRANULOCYTES NFR BLD AUTO: 0 % (ref 0–2)
LYMPHOCYTES # BLD AUTO: 1.61 THOUSANDS/ΜL (ref 0.6–4.47)
LYMPHOCYTES NFR BLD AUTO: 16 % (ref 14–44)
MCH RBC QN AUTO: 27 PG (ref 26.8–34.3)
MCHC RBC AUTO-ENTMCNC: 30.7 G/DL (ref 31.4–37.4)
MCV RBC AUTO: 88 FL (ref 82–98)
MONOCYTES # BLD AUTO: 0.63 THOUSAND/ΜL (ref 0.17–1.22)
MONOCYTES NFR BLD AUTO: 6 % (ref 4–12)
NEUTROPHILS # BLD AUTO: 7.66 THOUSANDS/ΜL (ref 1.85–7.62)
NEUTS SEG NFR BLD AUTO: 77 % (ref 43–75)
NRBC BLD AUTO-RTO: 0 /100 WBCS
PLATELET # BLD AUTO: 228 THOUSANDS/UL (ref 149–390)
PMV BLD AUTO: 10.3 FL (ref 8.9–12.7)
POTASSIUM SERPL-SCNC: 3.8 MMOL/L (ref 3.5–5.3)
RBC # BLD AUTO: 5.41 MILLION/UL (ref 3.81–5.12)
SODIUM SERPL-SCNC: 138 MMOL/L (ref 136–145)
TROPONIN I SERPL-MCNC: <0.02 NG/ML
WBC # BLD AUTO: 10.06 THOUSAND/UL (ref 4.31–10.16)

## 2018-06-04 PROCEDURE — 80048 BASIC METABOLIC PNL TOTAL CA: CPT | Performed by: EMERGENCY MEDICINE

## 2018-06-04 PROCEDURE — 85025 COMPLETE CBC W/AUTO DIFF WBC: CPT | Performed by: EMERGENCY MEDICINE

## 2018-06-04 PROCEDURE — 93005 ELECTROCARDIOGRAM TRACING: CPT

## 2018-06-04 PROCEDURE — 36415 COLL VENOUS BLD VENIPUNCTURE: CPT | Performed by: EMERGENCY MEDICINE

## 2018-06-04 PROCEDURE — 99284 EMERGENCY DEPT VISIT MOD MDM: CPT

## 2018-06-04 PROCEDURE — 84484 ASSAY OF TROPONIN QUANT: CPT | Performed by: EMERGENCY MEDICINE

## 2018-06-04 NOTE — ED PROVIDER NOTES
History  Chief Complaint   Patient presents with    Syncope     States she was watching TV , got up to answer the door and passed out  Denies injury, does not know how long she was passed out  States her head and chest " just feel weird "      Hx SYNCOPAL EPISODES MANY YRS AGO  HAD SYNCOPAL EPISODE TODAY, POSS LOC X 8 MIN THIS AM  NEG NEURO AND PHYSICAL EXAM, NO SIGNS OF TRAUMA        Syncope   Episode history:  Single  Most recent episode: Today  Duration:  8 minutes  Progression:  Resolved  Chronicity:  Recurrent  Context: normal activity    Witnessed: no    Relieved by:  None tried  Worsened by:  Nothing  Ineffective treatments:  None tried      Prior to Admission Medications   Prescriptions Last Dose Informant Patient Reported? Taking? Etonogestrel (NEXPLANON) 76 MG IMPL More than a month at Unknown time  Yes No   Sig: Inject under the skin      Facility-Administered Medications: None       Past Medical History:   Diagnosis Date    12 weeks gestation of pregnancy     RESOLVED: 1/8/15    Abnormal placenta, antepartum     RESOLVED: 14    Status post  section     RESOLVED: 16       Past Surgical History:   Procedure Laterality Date     SECTION, LOW TRANSVERSE  2014    LAST ASSESSED: 1/8/15       Family History   Problem Relation Age of Onset    Seizures Mother      EPILEPSY    Cancer Mother     Asthma Father     Asthma Brother     Seizures Maternal Grandmother      EPILEPSY    Cancer Maternal Grandmother     Seizures Paternal Grandmother      EPILEPSY    Cancer Paternal Grandmother     Seizures Family      EPILEPSY     I have reviewed and agree with the history as documented  Social History   Substance Use Topics    Smoking status: Never Smoker    Smokeless tobacco: Never Used    Alcohol use No        Review of Systems   Cardiovascular: Positive for syncope  All other systems reviewed and are negative        Physical Exam  Physical Exam   Constitutional: She is oriented to person, place, and time  She appears well-developed and well-nourished  No distress  HENT:   Head: Normocephalic and atraumatic  Eyes: Conjunctivae and EOM are normal  Pupils are equal, round, and reactive to light  Neck: Normal range of motion  Neck supple  Cardiovascular: Normal rate and regular rhythm  Pulmonary/Chest: Effort normal and breath sounds normal    Abdominal: Soft  There is no tenderness  Musculoskeletal: Normal range of motion  She exhibits no edema, tenderness or deformity  Neurological: She is alert and oriented to person, place, and time  No cranial nerve deficit  She exhibits normal muscle tone  Coordination normal    Skin: Skin is warm and dry  No rash noted  She is not diaphoretic  Nursing note and vitals reviewed        Vital Signs  ED Triage Vitals [06/04/18 1143]   Temperature Pulse Respirations Blood Pressure SpO2   (!) 97 1 °F (36 2 °C) 89 18 111/68 100 %      Temp Source Heart Rate Source Patient Position - Orthostatic VS BP Location FiO2 (%)   Tympanic Monitor Sitting Left arm --      Pain Score       No Pain           Vitals:    06/04/18 1143   BP: 111/68   Pulse: 89   Patient Position - Orthostatic VS: Sitting       Visual Acuity  Visual Acuity      Most Recent Value   L Pupil Size (mm)  5   R Pupil Size (mm)  5          ED Medications  Medications - No data to display    Diagnostic Studies  Results Reviewed     Procedure Component Value Units Date/Time    Troponin I [[id number]]  (Normal) Collected:  06/04/18 1301    Lab Status:  Final result Specimen:  Blood from Arm, Right Updated:  06/04/18 1337     Troponin I <0 02 ng/mL     Narrative:         [employer] Chemistry analyzer 99% cutoff is > 0 04 ng/mL in network labs    o cTnI 99% cutoff is useful only when applied to patients in the clinical setting of myocardial ischemia  o cTnI 99% cutoff should be interpreted in the context of clinical history, ECG findings and possibly cardiac imaging to establish correct diagnosis  o cTnI 99% cutoff may be suggestive but clearly not indicative of a coronary event without the clinical setting of myocardial ischemia  Basic metabolic panel [[id number]] Collected:  06/04/18 1301    Lab Status:  Final result Specimen:  Blood from Arm, Right Updated:  06/04/18 1319     Sodium 138 mmol/L      Potassium 3 8 mmol/L      Chloride 105 mmol/L      CO2 23 mmol/L      Anion Gap 10 mmol/L      BUN 9 mg/dL      Creatinine 0 70 mg/dL      Glucose 82 mg/dL      Calcium 9 0 mg/dL      eGFR 123 ml/min/1 73sq m     Narrative:         National Kidney Disease Education Program recommendations are as follows:  GFR calculation is accurate only with a steady state creatinine  Chronic Kidney disease less than 60 ml/min/1 73 sq  meters  Kidney failure less than 15 ml/min/1 73 sq  meters      CBC and differential [[id number]]  (Abnormal) Collected:  06/04/18 1301    Lab Status:  Final result Specimen:  Blood from Arm, Right Updated:  06/04/18 1307     WBC 10 06 Thousand/uL      RBC 5 41 (H) Million/uL      Hemoglobin 14 6 g/dL      Hematocrit 47 6 (H) %      MCV 88 fL      MCH 27 0 pg      MCHC 30 7 (L) g/dL      RDW 12 5 %      MPV 10 3 fL      Platelets 995 Thousands/uL      nRBC 0 /100 WBCs      Neutrophils Relative 77 (H) %      Immat GRANS % 0 %      Lymphocytes Relative 16 %      Monocytes Relative 6 %      Eosinophils Relative 1 %      Basophils Relative 0 %      Neutrophils Absolute 7 66 (H) Thousands/µL      Immature Grans Absolute 0 02 Thousand/uL      Lymphocytes Absolute 1 61 Thousands/µL      Monocytes Absolute 0 63 Thousand/µL      Eosinophils Absolute 0 11 Thousand/µL      Basophils Absolute 0 03 Thousands/µL                  No orders to display              Procedures  ECG 12 Lead Documentation  Date/Time: 6/4/2018 1:13 PM  Performed by: Erick Lindesy  Authorized by: Erick Lindsey     ECG reviewed by me, the ED Provider: yes    Patient location:  ED  Interpretation: Interpretation: normal    Rate:     ECG rate:  81    ECG rate assessment: normal    Rhythm:     Rhythm: sinus rhythm    Ectopy:     Ectopy: none    QRS:     QRS axis:  Normal    QRS intervals:  Normal  Conduction:     Conduction: normal    ST segments:     ST segments:  Normal  T waves:     T waves: normal             Phone Contacts  ED Phone Contact    ED Course                               MDM  Number of Diagnoses or Management Options  Diagnosis management comments: NEG W/UP, LIKELY VASOVAGAL, WILL F/UP W/ CARD    CritCare Time    Disposition  Final diagnoses:   Vasovagal syncope     Time reflects when diagnosis was documented in both MDM as applicable and the Disposition within this note     Time User Action Codes Description Comment    6/4/2018  2:05 PM Jason Parham Add [R55] Vasovagal syncope       ED Disposition     ED Disposition Condition Comment    Discharge  Maycol Romero discharge to home/self care  Condition at discharge: Stable        Follow-up Information     Follow up With Specialties Details Why Contact Info    Shravan De Leon MD Cardiology Schedule an appointment as soon as possible for a visit in 2 days  [facility]  [phone]            Patient's Medications   Discharge Prescriptions    No medications on file     No discharge procedures on file      ED Provider  Electronically Signed by           Madhuri Contreras MD  06/04/18 6636

## 2018-06-04 NOTE — DISCHARGE INSTRUCTIONS
Syncope   WHAT YOU NEED TO KNOW:   Syncope is also called fainting or passing out  Syncope is a sudden, temporary loss of consciousness, followed by a fall from a standing or sitting position  Syncope ranges from not serious to a sign of a more serious condition that needs to be treated  You can control some health conditions that cause syncope  Your healthcare providers can help you create a plan to manage syncope and prevent episodes  DISCHARGE INSTRUCTIONS:   Seek care immediately if:   · You are bleeding because you hit your head when you fainted  · You suddenly have double vision, difficulty speaking, numbness, and cannot move your arms or legs  · You have chest pain and trouble breathing  · You vomit blood or material that looks like coffee grounds  · You see blood in your bowel movement  Contact your healthcare provider if:   · You have new or worsening symptoms  · You have another syncope episode  · You have a headache, fast heartbeat, or feel too dizzy to stand up  · You have questions or concerns about your condition or care  Follow up with your healthcare provider as directed:  Write down your questions so you remember to ask them during your visits  Manage syncope:   · Keep a record of your syncope episodes  Include your symptoms and your activity before and after the episode  The record can help your healthcare provider find the cause of your syncope and help you manage episodes  · Sit or lie down when needed  This includes when you feel dizzy, your throat is getting tight, and your vision changes  Raise your legs above the level of your heart  · Take slow, deep breaths if you start to breathe faster with anxiety or fear  This can help decrease dizziness and the feeling that you might faint  · Check your blood pressure often  This is important if you take medicine to lower your blood pressure   Check your blood pressure when you are lying down and when you are standing  Ask how often to check during the day  Keep a record of your blood pressure numbers  Your healthcare provider may use the record to help plan your treatment  Prevent a syncope episode:   · Move slowly and let yourself get used to one position before you move to another position  This is very important when you change from a lying or sitting position to a standing position  Take some deep breaths before you stand up from a lying position  Stand up slowly  Sudden movements may cause a fainting spell  Sit on the side of the bed or couch for a few minutes before you stand up  If you are on bedrest, try to be upright for about 2 hours each day, or as directed  Do not lock your legs if you are standing for a long period of time  Move your legs and bend your knees to keep blood flowing  · Follow your healthcare provider's recommendations  Your provider may  recommend that you drink more liquids to prevent dehydration  You may also need to have more salt to keep your blood pressure from dropping too low and causing syncope  Your provider will tell you how much liquid and sodium to have each day  · Watch for signs of low blood sugar  These include hunger, nervousness, sweating, and fast or fluttery heartbeats  Talk with your healthcare provider about ways to keep your blood sugar level steady  · Do not strain if you are constipated  You may faint if you strain to have a bowel movement  Walking is the best way to get your bowels moving  Eat foods high in fiber to make it easier to have a bowel movement  Good examples are high-fiber cereals, beans, vegetables, and whole-grain breads  Prune juice may help make bowel movements softer  · Be careful in hot weather  Heat can cause a syncope episode  Limit activity done outside on hot days  Physical activity in hot weather can lead to dehydration  This can cause an episode    © 2017 [employer] Information is for End User's use only and may not be sold, redistributed or otherwise used for commercial purposes  All illustrations and images included in CareNotes® are the copyrighted property of [employer]  or [employer]  The above information is an  only  It is not intended as medical advice for individual conditions or treatments  Talk to your doctor, nurse or pharmacist before following any medical regimen to see if it is safe and effective for you

## 2018-06-04 NOTE — TELEPHONE ENCOUNTER
PT WAS TREATED AT THE ER ,WILL NEED NOTE TO GO BACK TO WORK,HAS APPT ON WED  ASKS IF WE WILL GIVE HER AN AEXTRA DAY  PLEASE CALL PT

## 2018-06-04 NOTE — LETTER
June 4, 2018     Patient: Mac Wynn   YOB: 1995   Date of Visit: 6/4/2018       To Whom it May Concern:    Mac Wynn is under my professional care  She will be seen for ER follow up on 6/6/18 please excuse from work until further evaluation  If you have any questions or concerns, please don't hesitate to call  Sincerely,    L  Jonna Giron / FREEDOM Wilde MD         CC: No Recipients

## 2018-06-05 LAB
ATRIAL RATE: 81 BPM
P AXIS: 42 DEGREES
PR INTERVAL: 166 MS
QRS AXIS: 44 DEGREES
QRSD INTERVAL: 98 MS
QT INTERVAL: 384 MS
QTC INTERVAL: 446 MS
T WAVE AXIS: 40 DEGREES
VENTRICULAR RATE: 81 BPM

## 2018-06-05 PROCEDURE — 93010 ELECTROCARDIOGRAM REPORT: CPT | Performed by: INTERNAL MEDICINE

## 2018-06-06 ENCOUNTER — OFFICE VISIT (OUTPATIENT)
Dept: FAMILY MEDICINE CLINIC | Facility: CLINIC | Age: 23
End: 2018-06-06
Payer: COMMERCIAL

## 2018-06-06 VITALS
TEMPERATURE: 97.8 F | BODY MASS INDEX: 43.25 KG/M2 | OXYGEN SATURATION: 95 % | WEIGHT: 270 LBS | SYSTOLIC BLOOD PRESSURE: 110 MMHG | RESPIRATION RATE: 16 BRPM | DIASTOLIC BLOOD PRESSURE: 70 MMHG | HEART RATE: 93 BPM

## 2018-06-06 DIAGNOSIS — Z09 FOLLOW UP: ICD-10-CM

## 2018-06-06 DIAGNOSIS — R55 SYNCOPE, UNSPECIFIED SYNCOPE TYPE: Primary | ICD-10-CM

## 2018-06-06 PROCEDURE — 99213 OFFICE O/P EST LOW 20 MIN: CPT | Performed by: FAMILY MEDICINE

## 2018-06-06 NOTE — LETTER
June 6, 2018     Patient: Maria Luisa Davila   YOB: 1995   Date of Visit: 6/6/2018       To Whom it May Concern:    Maria Luisa Davila is under my professional care  She was seen in my office on 6/6/2018  She may return to work on 6/7/18  If you have any questions or concerns, please don't hesitate to call           Sincerely,          Mitch Cazares        CC: No Recipients

## 2018-06-06 NOTE — PROGRESS NOTES
Assessment/Plan:    No problem-specific Assessment & Plan notes found for this encounter  Diagnoses and all orders for this visit:    Syncope, unspecified syncope type  -     Ambulatory referral to Cardiology; Future  -     Ambulatory referral to Neurology; Future    Follow up        # status post follow-up from ED visit for syncopal episode  -today patient presents for follow-up from the ED visit for syncopal episode  Orthostatics done in the office today which was within normal limits  Patient had a BMP, CBC, troponin done in the ED on June 4th which was within normal limits  Patient also underwent an EKG which showed normal sinus rhythm with no acute ischemic changes  I discussed with the patient that she would have a TSH done however patient refused that she did all blood work as necessary in the ED two days prior  On physical examination no thyromegaly or thyroid bruits noted and no family history of any thyroid disorders    -discussed with patient will give referral out for Cardiology and patient reports will be seeing Dr Connor Apodaca, on June 11, 2018  Discussed with patient she may need to be placed on a Holter monitor to rule out for any arrhythmia however any recommendations made by Dr Connor Apodaca will be greatly appreciated  -also provided patient with referral for Neurology as patient reports she has had the syncopal like episodes since childhood  Discussed with patient that she may need carotid duplex and/or EEG done as outpatient  Any recommendations made by neurologist are greatly appreciated  - return to work provided for June 7, 2018 for the patient at today's visit    # RTO in 3 months for follow up syncope     Subjective:      Patient ID: Maycol Romero is a 20 y o  female with no significant past medical history presents today for follow-up from the ER  Patient was seen in the ER on June 4th for syncopal episode    Patient reports she was watching TV when she got up to answer the door, she passed out  Patient reports this was unwitness fall, when she awoke approximately 8 minutes later as per the patient her daughter found her  Patient denies any history of injury or trauma  Patient denies any postictal state, seizure-like activity, lightheadedness dizziness, facial asymmetry, numbness, tingling  Patient denies any bladder or bowel incontinence  Patient denies any jerking for/abnormal upper extremity movements  No history of prior seizures  Patient reports a two day history of a frontal, temporal headache which she describes as throbbing for/pressure-like  which is intermittent, non radiating with the intensity of 2/10  Patient has been taking Tylenol which has been helping with the symptoms  Denies photophobia, phonophobia, nausea, vomiting  Last time eyes checked was unknown  Patient reports she has syncopal episodes since childhood and reports she has a sensation of episodes of passing out however does not  The patient reports this has been occurring since childhood and was told that it was related to puberty  No inciting event, aggravating/alleviating factors  Patient denies family history of thyroid disorders, history of cardiac disease  Patient does report distant family history of epilepsy  Patient denies any family history of neurological disorders  HPI    The following portions of the patient's history were reviewed and updated as appropriate:   She  has a past medical history of 12 weeks gestation of pregnancy; Abnormal placenta, antepartum; and Status post  section  She   Patient Active Problem List    Diagnosis Date Noted    Syncope 2018    Follow up 2018    Visit for gynecologic examination 2018    Morbid obesity ([facility] [address] ) 2017     She  has a past surgical history that includes  section, low transverse (2014)    Her family history includes Asthma in her brother and father; Cancer in her maternal grandmother, mother, and paternal grandmother; Seizures in her family, maternal grandmother, mother, and paternal grandmother  She  reports that she has never smoked  She has never used smokeless tobacco  She reports that she does not drink alcohol or use drugs  Current Outpatient Prescriptions   Medication Sig Dispense Refill    Etonogestrel (Letty Seen) 76 MG IMPL Inject under the skin       No current facility-administered medications for this visit  Current Outpatient Prescriptions on File Prior to Visit   Medication Sig    Etonogestrel (Grisndraza Seen) 76 MG IMPL Inject under the skin     No current facility-administered medications on file prior to visit  She has No Known Allergies       Review of Systems   Constitutional: Negative for activity change and chills  Respiratory: Negative for shortness of breath and wheezing  Cardiovascular: Negative for chest pain and palpitations  Tingling sensation in her chest     Gastrointestinal: Negative for abdominal pain, constipation and diarrhea  Genitourinary: Negative for decreased urine volume, dysuria and urgency  Skin: Negative for pallor and rash  Neurological: Positive for headaches  Negative for syncope, weakness, light-headedness and numbness  Psychiatric/Behavioral: Negative for behavioral problems, dysphoric mood, hallucinations, sleep disturbance and suicidal ideas  The patient is not nervous/anxious and is not hyperactive  Objective:      /70 (BP Location: Right arm, Patient Position: Sitting, Cuff Size: Extra-Large)   Pulse 93   Temp 97 8 °F (36 6 °C) (Tympanic)   Resp 16   Wt 122 kg (270 lb)   SpO2 95%   BMI 43 25 kg/m²          Physical Exam   Constitutional: She is oriented to person, place, and time  She appears well-developed and well-nourished  No distress  HENT:   Head: Normocephalic and atraumatic  Eyes: Conjunctivae are normal  Pupils are equal, round, and reactive to light  Neck: Neck supple  No JVD present   No thyromegaly present  No thyroid bruit   Cardiovascular: Normal rate, regular rhythm, normal heart sounds and intact distal pulses  Exam reveals no gallop and no friction rub  No murmur heard  Pulmonary/Chest: Effort normal and breath sounds normal  No respiratory distress  She has no wheezes  She has no rales  Abdominal: Soft  Bowel sounds are normal  She exhibits no distension and no mass  There is no tenderness  There is no rebound and no guarding  Neurological: She is alert and oriented to person, place, and time  No cranial nerve deficit  Coordination normal    Skin: She is not diaphoretic  Psychiatric: She has a normal mood and affect  Her behavior is normal  Judgment and thought content normal    Nursing note and vitals reviewed          Crystal Lane

## 2018-06-18 ENCOUNTER — VBI (OUTPATIENT)
Dept: FAMILY MEDICINE CLINIC | Facility: CLINIC | Age: 23
End: 2018-06-18

## 2018-06-18 NOTE — TELEPHONE ENCOUNTER
Pt was seen in [address] on 6/4/18  CC: Syncope  DX: sovagal syncope  Pt was seen for F/u appt @ [facility] on 6/6/18

## 2018-10-09 ENCOUNTER — OFFICE VISIT (OUTPATIENT)
Dept: FAMILY MEDICINE CLINIC | Facility: CLINIC | Age: 23
End: 2018-10-09
Payer: COMMERCIAL

## 2018-10-09 VITALS
RESPIRATION RATE: 18 BRPM | SYSTOLIC BLOOD PRESSURE: 112 MMHG | TEMPERATURE: 97.7 F | OXYGEN SATURATION: 96 % | WEIGHT: 238 LBS | HEART RATE: 78 BPM | BODY MASS INDEX: 38.13 KG/M2 | DIASTOLIC BLOOD PRESSURE: 64 MMHG

## 2018-10-09 DIAGNOSIS — Z30.46 NEXPLANON REMOVAL: Primary | ICD-10-CM

## 2018-10-09 PROCEDURE — 58301 REMOVE INTRAUTERINE DEVICE: CPT | Performed by: OBSTETRICS & GYNECOLOGY

## 2018-10-10 PROBLEM — Z30.46 NEXPLANON REMOVAL: Status: ACTIVE | Noted: 2018-10-10

## 2018-10-10 NOTE — PROGRESS NOTES
Carmen Him  1995      CC:  Nexplanon removal    S:  20 y o  female here for Nexplanon removal  The reason for her removal is she desires pregnancy    We reviewed the risks of Nexplanon removal    Written consent was obtained from the patient, myself, and Dr Ness Wylie  O:  Blood pressure 112/64, pulse 78, temperature 97 7 °F (36 5 °C), resp  rate 18, weight 108 kg (238 lb), SpO2 96 %, not currently breastfeeding  Patient appears well and is not in distress  PROCEDURE:     Removal: The skin of the upper, inner left arm was prepped with alcohol and local anesthesia with 2cc of 2% lidocaine  was obtained  Betadine was used to cleanse the insertion area  The device was removed using a #11 blade scalpel and a Mosquito forceps without incident  There were no complications  She tolerated the procedure well  Incision was closed with two 4  0 ethynyl stiches with pressure dressing applied  A: Nexplanon Removal    P:  Patient will return in 1 week for suture removal    She will also return for her yearly exam as scheduled  She will call sooner with any problems

## 2018-10-17 ENCOUNTER — OFFICE VISIT (OUTPATIENT)
Dept: FAMILY MEDICINE CLINIC | Facility: CLINIC | Age: 23
End: 2018-10-17
Payer: COMMERCIAL

## 2018-10-17 VITALS — WEIGHT: 238 LBS | RESPIRATION RATE: 18 BRPM | BODY MASS INDEX: 38.13 KG/M2 | TEMPERATURE: 98.8 F

## 2018-10-17 DIAGNOSIS — Z48.02 VISIT FOR SUTURE REMOVAL: Primary | ICD-10-CM

## 2018-10-17 PROCEDURE — 99213 OFFICE O/P EST LOW 20 MIN: CPT | Performed by: STUDENT IN AN ORGANIZED HEALTH CARE EDUCATION/TRAINING PROGRAM

## 2018-10-17 PROCEDURE — 3725F SCREEN DEPRESSION PERFORMED: CPT | Performed by: STUDENT IN AN ORGANIZED HEALTH CARE EDUCATION/TRAINING PROGRAM

## 2018-10-17 NOTE — PROGRESS NOTES
Assessment/Plan:    Diagnoses and all orders for this visit:    #1: Visit for suture removal  2 Stitches placed on left upper extremity after Nexplanon removal on 10/9/18 with advise to follow up in 1 week for removal; Area was cleaned with iodide solution and 2 Stitches removed with evidence of proper wound healing; Skin remained D/C/I  #2: BMI 38 0-38 9,adult      Subjective:      Patient ID: Belkis Barnes is a 24 y o  female  HPI  24year old female presents to clinic for stitch removal after recent removal of Nexplanon on 10/9/18  Denies any swelling, erythema, or discharge at suture site on left upper extremity  The following portions of the patient's history were reviewed and updated as appropriate: allergies, current medications, past family history, past medical history, past social history, past surgical history and problem list     Review of Systems   Constitutional: Negative for chills, diaphoresis, fatigue and fever  Musculoskeletal: Negative for joint swelling  Left Upper Extremity: 2 Stitches        Objective:    Temp 98 8 °F (37 1 °C)   Resp 18   Wt 108 kg (238 lb)   BMI 38 13 kg/m²      Physical Exam   Constitutional: She appears well-developed and well-nourished  No distress  Cardiovascular: Normal rate, regular rhythm, normal heart sounds and intact distal pulses  Exam reveals no gallop and no friction rub  No murmur heard  Pulmonary/Chest: Effort normal and breath sounds normal  No respiratory distress  She has no wheezes  She has no rales  She exhibits no tenderness  Musculoskeletal:   Left Upper Extremity: Suture is D/C/I; 2 Stitches Removed   Skin: She is not diaphoretic  Nursing note and vitals reviewed

## 2019-12-30 ENCOUNTER — HOSPITAL ENCOUNTER (EMERGENCY)
Facility: HOSPITAL | Age: 24
Discharge: HOME | End: 2019-12-30
Attending: EMERGENCY MEDICINE | Admitting: EMERGENCY MEDICINE
Payer: OTHER GOVERNMENT

## 2019-12-30 VITALS
DIASTOLIC BLOOD PRESSURE: 80 MMHG | WEIGHT: 227.51 LBS | BODY MASS INDEX: 36.45 KG/M2 | HEART RATE: 89 BPM | RESPIRATION RATE: 16 BRPM | SYSTOLIC BLOOD PRESSURE: 139 MMHG | TEMPERATURE: 96.2 F | OXYGEN SATURATION: 100 %

## 2019-12-30 DIAGNOSIS — S39.012A STRAIN OF LUMBAR REGION, INITIAL ENCOUNTER: Primary | ICD-10-CM

## 2019-12-30 LAB
BACTERIA UR QL AUTO: ABNORMAL /HPF
BILIRUB UR QL STRIP: NEGATIVE
CLARITY UR: CLEAR
COLOR UR: YELLOW
EXT PREG TEST URINE: NEGATIVE
EXT. CONTROL ED NAV: NORMAL
GLUCOSE UR STRIP-MCNC: NEGATIVE MG/DL
HGB UR QL STRIP.AUTO: NEGATIVE
KETONES UR STRIP-MCNC: NEGATIVE MG/DL
LEUKOCYTE ESTERASE UR QL STRIP: NEGATIVE
NITRITE UR QL STRIP: NEGATIVE
NON-SQ EPI CELLS URNS QL MICRO: ABNORMAL /HPF
PH UR STRIP.AUTO: 7 [PH]
PROT UR STRIP-MCNC: ABNORMAL MG/DL
RBC #/AREA URNS AUTO: ABNORMAL /HPF
SP GR UR STRIP.AUTO: 1.02 (ref 1–1.03)
UROBILINOGEN UR QL STRIP.AUTO: 0.2 E.U./DL
WBC #/AREA URNS AUTO: ABNORMAL /HPF
WBC CLUMPS # UR AUTO: PRESENT /UL

## 2019-12-30 PROCEDURE — 81025 URINE PREGNANCY TEST: CPT | Performed by: PHYSICIAN ASSISTANT

## 2019-12-30 PROCEDURE — 81001 URINALYSIS AUTO W/SCOPE: CPT | Performed by: PHYSICIAN ASSISTANT

## 2019-12-30 PROCEDURE — 99283 EMERGENCY DEPT VISIT LOW MDM: CPT

## 2019-12-30 RX ORDER — LIDOCAINE 50 MG/G
1 PATCH TOPICAL ONCE
Status: DISCONTINUED | OUTPATIENT
Start: 2019-12-30 | End: 2019-12-30 | Stop reason: HOSPADM

## 2019-12-30 RX ORDER — LIDOCAINE 50 MG/G
1 PATCH TOPICAL DAILY
Qty: 10 PATCH | Refills: 0 | Status: SHIPPED | OUTPATIENT
Start: 2019-12-30 | End: 2022-05-03

## 2019-12-30 RX ORDER — KETOROLAC TROMETHAMINE 30 MG/ML
30 INJECTION, SOLUTION INTRAMUSCULAR; INTRAVENOUS ONCE
Status: DISCONTINUED | OUTPATIENT
Start: 2019-12-30 | End: 2019-12-30

## 2019-12-30 RX ORDER — ACETAMINOPHEN 325 MG/1
650 TABLET ORAL EVERY 6 HOURS PRN
COMMUNITY
End: 2022-05-03

## 2019-12-30 RX ADMIN — LIDOCAINE 1 PATCH: 50 PATCH TOPICAL at 09:07

## 2019-12-30 NOTE — ED PROVIDER NOTES
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 Taking? Naproxen Sodium (ALEVE PO) 2019 at 0600  Yes Yes   Sig: Take 2 capsules by mouth   acetaminophen (TYLENOL) 325 mg tablet 2019 at Unknown time  Yes Yes   Sig: Take 650 mg by mouth every 6 (six) hours as needed for mild pain      Facility-Administered Medications: None       Past Medical History:   Diagnosis Date    12 weeks gestation of pregnancy     RESOLVED: 1/8/15    Abnormal placenta, antepartum     RESOLVED: 14    Status post  section     RESOLVED: 16       Past Surgical History:   Procedure Laterality Date     SECTION, LOW TRANSVERSE  2014    LAST ASSESSED: 1/8/15       Family History   Problem Relation Age of Onset    Seizures Mother         EPILEPSY    Cancer Mother     Asthma Father     Asthma Brother     Seizures Maternal Grandmother         EPILEPSY    Cancer Maternal Grandmother     Seizures Paternal Grandmother         EPILEPSY    Cancer Paternal Grandmother     Seizures Family         EPILEPSY     I have reviewed and agree with the history as documented      Social History     Tobacco Use    Smoking status: Never Smoker    Smokeless tobacco: Never Used   Substance Use Topics    Alcohol use: Yes     Comment: rare    Drug use: No        Review of Systems   Constitutional: Negative for chills and fever  HENT: Negative for sneezing and sore throat  Respiratory: Negative for cough and shortness of breath  Cardiovascular: Negative for chest pain, palpitations and leg swelling  Gastrointestinal: Negative for abdominal pain, constipation, diarrhea, nausea and vomiting  Genitourinary: Negative for decreased urine volume, difficulty urinating, dysuria, flank pain, frequency, hematuria, menstrual problem, pelvic pain, urgency, vaginal bleeding, vaginal discharge and vaginal pain  Musculoskeletal: Positive for back pain and myalgias  Negative for gait problem and joint swelling  Skin: Negative for color change, pallor, rash and wound  Neurological: Negative for dizziness, syncope, weakness, light-headedness, numbness and headaches  All other systems reviewed and are negative  Physical Exam  Physical Exam   Constitutional: She appears well-developed and well-nourished  No distress  HENT:   Head: Normocephalic and atraumatic  Nose: Nose normal    Eyes: EOM are normal    Neck: Normal range of motion  Cardiovascular: Normal rate, regular rhythm, normal heart sounds and intact distal pulses  Exam reveals no gallop and no friction rub  No murmur heard  Pulmonary/Chest: Effort normal and breath sounds normal  No stridor  No respiratory distress  She has no wheezes  She has no rales  Sp02 is 100% indicating adequate oxygenation on room air   Musculoskeletal: Normal range of motion  She exhibits tenderness  She exhibits no edema or deformity  Back:    Skin: Skin is warm and dry  Capillary refill takes less than 2 seconds  No rash noted  She is not diaphoretic  No erythema  No pallor  Nursing note and vitals reviewed        Vital Signs  ED Triage Vitals [12/30/19 0849] Temperature Pulse Respirations Blood Pressure SpO2   (!) 96 2 °F (35 7 °C) (!) 107 18 146/75 100 %      Temp Source Heart Rate Source Patient Position - Orthostatic VS BP Location FiO2 (%)   Tympanic Monitor Lying Right arm --      Pain Score       9           Vitals:    12/30/19 0849   BP: 146/75   Pulse: (!) 107   Patient Position - Orthostatic VS: Lying         Visual Acuity      ED Medications  Medications   lidocaine (LIDODERM) 5 % patch 1 patch (1 patch Topical Medication Applied 12/30/19 0907)       Diagnostic Studies  Results Reviewed     Procedure Component Value Units Date/Time    POCT pregnancy, urine [[id number]]  (Normal) Resulted:  12/30/19 1004    Lab Status:  Final result Updated:  12/30/19 1004     EXT PREG TEST UR (Ref: Negative) negative     Control valid    Urine Microscopic [[id number]]  (Abnormal) Collected:  12/30/19 0909    Lab Status:  Final result Specimen:  Urine, Clean Catch Updated:  12/30/19 0936     RBC, UA 0-1 /hpf      WBC, UA 30-50 /hpf      Epithelial Cells Moderate /hpf      Bacteria, UA Occasional /hpf      WBC Clumps present    UA (URINE) with reflex to Scope [[id number]]  (Abnormal) Collected:  12/30/19 0909    Lab Status:  Final result Specimen:  Urine, Clean Catch Updated:  12/30/19 0918     Color, UA Yellow     Clarity, UA Clear     Specific [place], UA 1 020     pH, UA 7 0     Leukocytes, UA Negative     Nitrite, UA Negative     Protein, UA Trace mg/dl      Glucose, UA Negative mg/dl      Ketones, UA Negative mg/dl      Urobilinogen, UA 0 2 E U /dl      Bilirubin, UA Negative     Blood, UA Negative                 No orders to display              Procedures  Procedures         ED Course                               MDM  Number of Diagnoses or Management Options  Strain of lumbar region, initial encounter:   Diagnosis management comments: Patient well appearing, afebrile, no cauda equina s/s, no spinal tenderness to palpation  UA clear, neg preg  Advised rest, ice, ibuprofen, lidoderm patches for pain  Encouraged use of cushion to alleviate pressure off of back  Gave patient proper education regarding diagnosis  Answered all questions  Return to ED for any worsening of symptoms otherwise follow up with primary care physician for re-evaluation  Discussed plan with patient who verbalized understanding and agreed to plan  Amount and/or Complexity of Data Reviewed  Tests in the medicine section of CPT®: ordered and reviewed  Discussion of test results with the performing providers: yes  Review and summarize past medical records: yes  Discuss the patient with other providers: yes  Independent visualization of images, tracings, or specimens: yes          Disposition  Final diagnoses:   Strain of lumbar region, initial encounter     Time reflects when diagnosis was documented in both MDM as applicable and the Disposition within this note     Time User Action Codes Description Comment    12/30/2019 10:09 AM Regla Peter Add [[id number]] Strain of lumbar region, initial encounter       ED Disposition     ED Disposition Condition Date/Time Comment    Discharge Stable Mon Dec 30, 2019 10:09 AM Nitin Castellon discharge to home/self care              Follow-up Information     Follow up With Specialties Details Why Contact Info Additional Information    [facility] Schedule an appointment as soon as possible for a visit in 1 week If symptoms worsen Kennedy Macario [address] [place] [address] Emergency Department Emergency Medicine Go to  As needed [address] [facility], [place] [employer], [place], [address]          Patient's Medications   Discharge Prescriptions    LIDOCAINE (LIDODERM) 5 %    Apply 1 patch topically daily Remove & Discard patch within 12 hours or as directed by MD       Start Date: 12/30/2019End Date: --       Order Dose: 1 patch       Quantity: 10 patch    Refills: 0     No discharge procedures on file      ED Provider  Electronically Signed by           Chay Brown PA-C  12/30/19 1012

## 2022-05-03 ENCOUNTER — INITIAL PRENATAL (OUTPATIENT)
Dept: OBGYN CLINIC | Facility: CLINIC | Age: 27
End: 2022-05-03

## 2022-05-03 VITALS — SYSTOLIC BLOOD PRESSURE: 124 MMHG | DIASTOLIC BLOOD PRESSURE: 78 MMHG | WEIGHT: 293 LBS | BODY MASS INDEX: 49.18 KG/M2

## 2022-05-03 DIAGNOSIS — O34.219 PREVIOUS CESAREAN SECTION COMPLICATING PREGNANCY: ICD-10-CM

## 2022-05-03 DIAGNOSIS — O99.211 OBESITY AFFECTING PREGNANCY IN FIRST TRIMESTER: ICD-10-CM

## 2022-05-03 DIAGNOSIS — Z34.81 PRENATAL CARE, SUBSEQUENT PREGNANCY, FIRST TRIMESTER: Primary | ICD-10-CM

## 2022-05-03 PROBLEM — Z09 ENCOUNTER FOR FOLLOW-UP: Status: RESOLVED | Noted: 2018-06-06 | Resolved: 2022-05-03

## 2022-05-03 PROBLEM — R55 SYNCOPE: Status: RESOLVED | Noted: 2018-06-06 | Resolved: 2022-05-03

## 2022-05-03 PROBLEM — Z30.46 NEXPLANON REMOVAL: Status: RESOLVED | Noted: 2018-10-10 | Resolved: 2022-05-03

## 2022-05-03 PROBLEM — Z01.419 VISIT FOR GYNECOLOGIC EXAMINATION: Status: RESOLVED | Noted: 2018-03-19 | Resolved: 2022-05-03

## 2022-05-03 PROCEDURE — 87491 CHLMYD TRACH DNA AMP PROBE: CPT | Performed by: NURSE PRACTITIONER

## 2022-05-03 PROCEDURE — 87591 N.GONORRHOEAE DNA AMP PROB: CPT | Performed by: NURSE PRACTITIONER

## 2022-05-03 PROCEDURE — NOBC: Performed by: NURSE PRACTITIONER

## 2022-05-03 NOTE — PROGRESS NOTES
INITIAL OB VISIT: Pt presents as a new patient to our office after a positive home UPT  Medical History: Obesity, Varicella as child  Denies any hx of MRSA  Surgical History:      Medications: Prenatal with [facility]    Social History: Denies any alcohol, smoking, or drug use in pregnancy  Does NOT have cats  Has not been outside the country in the last 6 months  OB/GYN History: Menses monthly, regular LMP: 3/6/2022   G 2 P 1    due to fetal intolerance, no issues in pregnancy    TVUS: Archer IUP at 8w4d based on CRL 2 00cm (+) FHR 169bpm S=D THEO: 2022    Denies any N/V, HA, Cramping, VB, LOF, Edema, Domestic Violence, Smoking  No FM yet  Tolerating PNV  Consent and forms signed  Sequential screen reviewed  GC/CT done  Will return for physical exam and pap smear at next ob visit  Pregnancy essentials guide reviewed online  Plans on breastfeeding  Was not able to successfully breast feed in past due to inverted nipples  Rx for initial prenatal labs given as well as early GTT due to BMI  Referral for MFM placed  RTO 4 weeks or sooner as needed

## 2022-05-05 LAB
C TRACH DNA SPEC QL NAA+PROBE: NEGATIVE
N GONORRHOEA DNA SPEC QL NAA+PROBE: NEGATIVE

## 2022-05-17 ENCOUNTER — APPOINTMENT (OUTPATIENT)
Dept: LAB | Facility: HOSPITAL | Age: 27
End: 2022-05-17
Payer: COMMERCIAL

## 2022-05-17 DIAGNOSIS — Z34.81 PRENATAL CARE, SUBSEQUENT PREGNANCY, FIRST TRIMESTER: ICD-10-CM

## 2022-05-17 LAB
ABO GROUP BLD: NORMAL
BASOPHILS # BLD AUTO: 0.03 THOUSANDS/ΜL (ref 0–0.1)
BASOPHILS NFR BLD AUTO: 0 % (ref 0–1)
BLD GP AB SCN SERPL QL: NEGATIVE
EOSINOPHIL # BLD AUTO: 0.04 THOUSAND/ΜL (ref 0–0.61)
EOSINOPHIL NFR BLD AUTO: 0 % (ref 0–6)
ERYTHROCYTE [DISTWIDTH] IN BLOOD BY AUTOMATED COUNT: 13.2 % (ref 11.6–15.1)
GLUCOSE 1H P 50 G GLC PO SERPL-MCNC: 173 MG/DL (ref 40–134)
HBV SURFACE AG SER QL: NORMAL
HCT VFR BLD AUTO: 38.7 % (ref 34.8–46.1)
HCV AB SER QL: NORMAL
HGB BLD-MCNC: 12.6 G/DL (ref 11.5–15.4)
IMM GRANULOCYTES # BLD AUTO: 0.03 THOUSAND/UL (ref 0–0.2)
IMM GRANULOCYTES NFR BLD AUTO: 0 % (ref 0–2)
LYMPHOCYTES # BLD AUTO: 1.23 THOUSANDS/ΜL (ref 0.6–4.47)
LYMPHOCYTES NFR BLD AUTO: 14 % (ref 14–44)
MCH RBC QN AUTO: 27.8 PG (ref 26.8–34.3)
MCHC RBC AUTO-ENTMCNC: 32.6 G/DL (ref 31.4–37.4)
MCV RBC AUTO: 85 FL (ref 82–98)
MONOCYTES # BLD AUTO: 0.36 THOUSAND/ΜL (ref 0.17–1.22)
MONOCYTES NFR BLD AUTO: 4 % (ref 4–12)
NEUTROPHILS # BLD AUTO: 7.35 THOUSANDS/ΜL (ref 1.85–7.62)
NEUTS SEG NFR BLD AUTO: 82 % (ref 43–75)
NRBC BLD AUTO-RTO: 0 /100 WBCS
PLATELET # BLD AUTO: 309 THOUSANDS/UL (ref 149–390)
PMV BLD AUTO: 10.3 FL (ref 8.9–12.7)
RBC # BLD AUTO: 4.54 MILLION/UL (ref 3.81–5.12)
RH BLD: POSITIVE
RPR SER QL: NORMAL
RUBV IGG SERPL IA-ACNC: >175 IU/ML
SPECIMEN EXPIRATION DATE: NORMAL
WBC # BLD AUTO: 9.04 THOUSAND/UL (ref 4.31–10.16)

## 2022-05-17 PROCEDURE — 80081 OBSTETRIC PANEL INC HIV TSTG: CPT | Performed by: NURSE PRACTITIONER

## 2022-05-17 PROCEDURE — 86803 HEPATITIS C AB TEST: CPT | Performed by: NURSE PRACTITIONER

## 2022-05-17 PROCEDURE — 82950 GLUCOSE TEST: CPT | Performed by: NURSE PRACTITIONER

## 2022-05-17 PROCEDURE — 81220 CFTR GENE COM VARIANTS: CPT

## 2022-05-17 PROCEDURE — 87086 URINE CULTURE/COLONY COUNT: CPT | Performed by: NURSE PRACTITIONER

## 2022-05-17 PROCEDURE — 36415 COLL VENOUS BLD VENIPUNCTURE: CPT

## 2022-05-17 PROCEDURE — 81329 SMN1 GENE DOS/DELETION ALYS: CPT

## 2022-05-18 DIAGNOSIS — R73.09 ABNORMAL GLUCOSE TOLERANCE TEST: Primary | ICD-10-CM

## 2022-05-18 LAB
BACTERIA UR CULT: NORMAL
HIV 1+2 AB+HIV1 P24 AG SERPL QL IA: NORMAL

## 2022-05-23 LAB
CF COMMENT: NORMAL
CFTR MUT ANL BLD/T: NORMAL
CLINICAL INFO: NORMAL
ETHNIC BACKGROUND STATED: NORMAL
GENE MUT TESTED BLD/T: NORMAL
GENERAL COMMENTS:: NORMAL
LAB DIRECTOR NAME PROVIDER: NORMAL
REASON FOR REFERRAL (NARRATIVE): NORMAL
REF LAB TEST METHOD: NORMAL
SL AMB DISCLAIMER: NORMAL
SL AMB GENETIC COUNSELOR: NORMAL
SMN1 GENE MUT ANL BLD/T: NORMAL
SPECIMEN SOURCE: NORMAL

## 2022-05-24 ENCOUNTER — ROUTINE PRENATAL (OUTPATIENT)
Dept: OBGYN CLINIC | Facility: CLINIC | Age: 27
End: 2022-05-24

## 2022-05-24 ENCOUNTER — APPOINTMENT (OUTPATIENT)
Dept: LAB | Facility: HOSPITAL | Age: 27
End: 2022-05-24
Payer: COMMERCIAL

## 2022-05-24 VITALS — BODY MASS INDEX: 48.86 KG/M2 | SYSTOLIC BLOOD PRESSURE: 124 MMHG | WEIGHT: 293 LBS | DIASTOLIC BLOOD PRESSURE: 80 MMHG

## 2022-05-24 DIAGNOSIS — O24.419 GESTATIONAL DIABETES MELLITUS (GDM) IN FIRST TRIMESTER, GESTATIONAL DIABETES METHOD OF CONTROL UNSPECIFIED: ICD-10-CM

## 2022-05-24 DIAGNOSIS — Z34.81 PRENATAL CARE, SUBSEQUENT PREGNANCY, FIRST TRIMESTER: Primary | ICD-10-CM

## 2022-05-24 DIAGNOSIS — R73.09 ABNORMAL GLUCOSE TOLERANCE TEST: ICD-10-CM

## 2022-05-24 LAB — GLUCOSE P FAST SERPL-MCNC: 100 MG/DL (ref 65–99)

## 2022-05-24 PROCEDURE — G0145 SCR C/V CYTO,THINLAYER,RESCR: HCPCS | Performed by: NURSE PRACTITIONER

## 2022-05-24 PROCEDURE — PNV: Performed by: NURSE PRACTITIONER

## 2022-05-24 PROCEDURE — G0476 HPV COMBO ASSAY CA SCREEN: HCPCS | Performed by: NURSE PRACTITIONER

## 2022-05-24 PROCEDURE — 36415 COLL VENOUS BLD VENIPUNCTURE: CPT

## 2022-05-24 PROCEDURE — 82951 GLUCOSE TOLERANCE TEST (GTT): CPT

## 2022-05-24 NOTE — PROGRESS NOTES
OFFICE VISIT: Denies any  HA, Cramping, VB, LOF, Edema, Domestic Violence, Smoking  No FM yet  Tolerating PNV  States nausea but denies any vomiting  Reviewed lab results  Discussed abnormal GTT and abnormal fasting for 3 hour GTT  Discussed gestational diabetes, referral given for diabetic education  Pap with HPV done today  Urine neg/neg   RTO 4 weeks or sooner as needed

## 2022-05-27 LAB
HPV HR 12 DNA CVX QL NAA+PROBE: NEGATIVE
HPV16 DNA CVX QL NAA+PROBE: NEGATIVE
HPV18 DNA CVX QL NAA+PROBE: NEGATIVE

## 2022-06-01 LAB
LAB AP GYN PRIMARY INTERPRETATION: NORMAL
Lab: NORMAL

## 2022-06-07 ENCOUNTER — OFFICE VISIT (OUTPATIENT)
Dept: PERINATAL CARE | Facility: CLINIC | Age: 27
End: 2022-06-07
Payer: COMMERCIAL

## 2022-06-07 VITALS
HEIGHT: 66 IN | DIASTOLIC BLOOD PRESSURE: 81 MMHG | WEIGHT: 293 LBS | SYSTOLIC BLOOD PRESSURE: 112 MMHG | BODY MASS INDEX: 47.09 KG/M2 | HEART RATE: 99 BPM

## 2022-06-07 DIAGNOSIS — E66.01 MATERNAL MORBID OBESITY IN FIRST TRIMESTER, ANTEPARTUM (HCC): ICD-10-CM

## 2022-06-07 DIAGNOSIS — O24.419 GESTATIONAL DIABETES MELLITUS (GDM) IN FIRST TRIMESTER, GESTATIONAL DIABETES METHOD OF CONTROL UNSPECIFIED: Primary | ICD-10-CM

## 2022-06-07 DIAGNOSIS — Z3A.13 13 WEEKS GESTATION OF PREGNANCY: ICD-10-CM

## 2022-06-07 DIAGNOSIS — O99.211 MATERNAL MORBID OBESITY IN FIRST TRIMESTER, ANTEPARTUM (HCC): ICD-10-CM

## 2022-06-07 PROCEDURE — 99417 PROLNG OP E/M EACH 15 MIN: CPT | Performed by: NURSE PRACTITIONER

## 2022-06-07 PROCEDURE — 99215 OFFICE O/P EST HI 40 MIN: CPT | Performed by: NURSE PRACTITIONER

## 2022-06-07 RX ORDER — LANCETS 33 GAUGE
EACH MISCELLANEOUS
Qty: 100 EACH | Refills: 6 | Status: SHIPPED | OUTPATIENT
Start: 2022-06-07

## 2022-06-07 RX ORDER — BLOOD SUGAR DIAGNOSTIC
STRIP MISCELLANEOUS
Qty: 100 EACH | Refills: 6 | Status: SHIPPED | OUTPATIENT
Start: 2022-06-07

## 2022-06-07 NOTE — ASSESSMENT & PLAN NOTE
-Pre-pregnancy weight 307 lbs  -Current weight 308 lbs  -Recommended weight gain during pregnancy 11 to 20 lbs  -Start GDM diet    -Follow up with dietitian

## 2022-06-07 NOTE — ASSESSMENT & PLAN NOTE
-Check A1c and CMP  -A1c goal is 5 6% or less   -Schedule follow up with dietitian   -Keep 3 day food log to review with dietitian   -Start self monitoring blood glucose fasting and 2 hours after start of each meal  Keep glucose log  Glucose goals: fasting 60-90 mg/dL, 140 mg/dL or less 1 hour post meals, and 120 mg/dL or less 2 hours post meal    -Report glucose readings weekly via MyChart   -Start GDM calorie diet with 3 meals and 3 snacks including recommended combination of carb, protein and fat per meal/snack   -Please eat meal or snack every 2-3 5 hours while awake   -No more than 8 to 10 hours of fasting overnight   -Stay active if no restriction from your OB, walk up to 30 minutes a day  -Always have glucose available to treat hypoglycemia  Use 15:15 rule  Refer to hypoglycemia patient education sheet  Test blood sugar when experiencing signs and symptoms of hypoglycemia and prior to driving    -Continue prenatal vitamin as recommended   -Continue follow-up with your OB and MFM as recommended   -Stay in close contact with diabetes education team   -Insulin requirements during pregnancy; basal/bolus concept and Metformin discussed  -Very important to maintain tight glucose control during pregnancy to decrease risk factors including fetal macrosomia; birth injury; risk of ; polyhydramnios; pre-term labor; pre-eclampsia;  hypoglycemia; jaundice and stillbirth  -Diabetes and pregnancy booklet;  diet plan and hypoglycemia patient education                  No results found for: HGBA1C

## 2022-06-07 NOTE — PROGRESS NOTES
Assessment/Plan:    Gestational diabetes mellitus (GDM) in first trimester  -Check A1c and CMP  -A1c goal is 5 6% or less   -Schedule follow up with dietitian   -Keep 3 day food log to review with dietitian   -Start self monitoring blood glucose fasting and 2 hours after start of each meal  Keep glucose log  Glucose goals: fasting 60-90 mg/dL, 140 mg/dL or less 1 hour post meals, and 120 mg/dL or less 2 hours post meal    -Report glucose readings weekly via [email or web address]   -Start GDM calorie diet with 3 meals and 3 snacks including recommended combination of carb, protein and fat per meal/snack   -Please eat meal or snack every 2-3 5 hours while awake   -No more than 8 to 10 hours of fasting overnight   -Stay active if no restriction from your OB, walk up to 30 minutes a day  -Always have glucose available to treat hypoglycemia  Use 15:15 rule  Refer to hypoglycemia patient education sheet  Test blood sugar when experiencing signs and symptoms of hypoglycemia and prior to driving    -Continue prenatal vitamin as recommended   -Continue follow-up with your OB and MFM as recommended   -Stay in close contact with diabetes education team   -Insulin requirements during pregnancy; basal/bolus concept and Metformin discussed  -Very important to maintain tight glucose control during pregnancy to decrease risk factors including fetal macrosomia; birth injury; risk of ; polyhydramnios; pre-term labor; pre-eclampsia;  hypoglycemia; jaundice and stillbirth  -Diabetes and pregnancy booklet;  diet plan and hypoglycemia patient education  No results found for: HGBA1C    Maternal morbid obesity in first trimester, antepartum ([facility] [address] )  -Pre-pregnancy weight 307 lbs  -Current weight 308 lbs  -Recommended weight gain during pregnancy 11 to 20 lbs  -Start GDM diet    -Follow up with dietitian          Diagnoses and all orders for this visit:    Gestational diabetes mellitus (GDM) in first trimester, gestational diabetes method of control unspecified  -     Ambulatory Referral to Diabetic Education  -     Mychart glucose flowsheet  -     OneTouch Delica Lancets 21T MISC; Use 4 a day or as directed  -     OneTouch Verio test strip; Test 4 times a day and as instructed  Gestational diabetes  -     Hemoglobin A1C; Standing  -     Comprehensive metabolic panel; Future  -     Hemoglobin A1C    Maternal morbid obesity in first trimester, antepartum ([facility] [address] )  -     Mychart glucose flowsheet  -     OneTouch Delica Lancets 90V MISC; Use 4 a day or as directed  -     OneTouch Verio test strip; Test 4 times a day and as instructed  Gestational diabetes  -     Hemoglobin A1C; Standing  -     Comprehensive metabolic panel; Future  -     Hemoglobin A1C    13 weeks gestation of pregnancy  -     Mychart glucose flowsheet  -     OneTouch Delica Lancets 27C MISC; Use 4 a day or as directed  -     OneTouch Verio test strip; Test 4 times a day and as instructed  Gestational diabetes  -     Hemoglobin A1C; Standing  -     Comprehensive metabolic panel; Future  -     Hemoglobin A1C    BMI 45 0-49 9, adult ([facility])  -     Mychart glucose flowsheet  -     OneTouch Delica Lancets 86U MISC; Use 4 a day or as directed  -     OneTouch Verio test strip; Test 4 times a day and as instructed  Gestational diabetes  -     Hemoglobin A1C; Standing  -     Comprehensive metabolic panel; Future  -     Hemoglobin A1C          Subjective:      Patient ID: Rahul Bell is a 31 y o  female  15 2/7 weeks gestation GDM  No history of GDM  Has 7 yo daughter  Currently skips breakfast; eats around 1 PM; wakes up at 6 AM  Works in a group home  Glucose meter education provided with returned demonstration  History of following keto type diet about 2 years ago with 80 lbs weight loss  No family history of diabetes       HPI    The following portions of the patient's history were reviewed and updated as appropriate: allergies, current medications, past family history, past medical history, past social history, past surgical history and problem list     No Known Allergies  Current Outpatient Medications on File Prior to Visit   Medication Sig Dispense Refill    Prenatal MV-Min-Fe Fum-FA-DHA (PRENATAL+DHA PO) Take by mouth daily       No current facility-administered medications on file prior to visit  Review of Systems   Constitutional: Positive for fatigue  Negative for fever  HENT: Negative for congestion, sore throat and trouble swallowing  Eyes: Negative for visual disturbance  Respiratory: Positive for shortness of breath (with exertion)  Negative for cough  Cardiovascular: Negative for chest pain and palpitations  Gastrointestinal: Positive for nausea  Negative for constipation and vomiting  Endocrine: Positive for polyuria  Negative for polydipsia and polyphagia  Genitourinary: Negative for difficulty urinating and vaginal bleeding  Neurological: Negative for headaches  Psychiatric/Behavioral: Negative for sleep disturbance  Objective:    No results found for: HGBA1C   /81 (BP Location: Right arm, Patient Position: Sitting, Cuff Size: Extra-Large)   Pulse 99   Ht 5' 6" (1 676 m)   Wt (!) 140 kg (308 lb 3 2 oz)   LMP 03/06/2022   BMI 49 74 kg/m²          Physical Exam  Constitutional:       Appearance: She is obese  HENT:      Head: Normocephalic  Nose: Nose normal    Eyes:      Conjunctiva/sclera: Conjunctivae normal    Cardiovascular:      Rate and Rhythm: Normal rate and regular rhythm  Pulmonary:      Effort: Pulmonary effort is normal       Breath sounds: Normal breath sounds  Musculoskeletal:         General: Normal range of motion  Neurological:      Mental Status: She is alert and oriented to person, place, and time  Psychiatric:         Mood and Affect: Mood normal          Behavior: Behavior normal          Thought Content:  Thought content normal          Judgment: Judgment normal              Time in:1:15 PM  Time out:2:30 PM

## 2022-06-07 NOTE — PATIENT INSTRUCTIONS
-Check A1c and CMP  -A1c goal is 5 6% or less   -Schedule follow up with dietitian   -Keep 3 day food log to review with dietitian   -Start self monitoring blood glucose fasting and 2 hours after start of each meal  Keep glucose log  Glucose goals: fasting 60-90 mg/dL, 140 mg/dL or less 1 hour post meals, and 120 mg/dL or less 2 hours post meal    -Report glucose readings weekly via MyChart   -Start GDM calorie diet with 3 meals and 3 snacks including recommended combination of carb, protein and fat per meal/snack   -Please eat meal or snack every 2-3 5 hours while awake   -No more than 8 to 10 hours of fasting overnight   -Stay active if no restriction from your OB, walk up to 30 minutes a day  -Always have glucose available to treat hypoglycemia  Use 15:15 rule  Refer to hypoglycemia patient education sheet  Test blood sugar when experiencing signs and symptoms of hypoglycemia and prior to driving    -Continue prenatal vitamin as recommended   -Continue follow-up with your OB and MFM as recommended   -Stay in close contact with diabetes education team   -Insulin requirements during pregnancy; basal/bolus concept and Metformin discussed  -Very important to maintain tight glucose control during pregnancy to decrease risk factors including fetal macrosomia; birth injury; risk of ; polyhydramnios; pre-term labor; pre-eclampsia;  hypoglycemia; jaundice and stillbirth  -Diabetes and pregnancy booklet;  diet plan and hypoglycemia patient education

## 2022-06-10 ENCOUNTER — ROUTINE PRENATAL (OUTPATIENT)
Dept: PERINATAL CARE | Facility: OTHER | Age: 27
End: 2022-06-10
Payer: COMMERCIAL

## 2022-06-10 VITALS
SYSTOLIC BLOOD PRESSURE: 107 MMHG | HEART RATE: 92 BPM | BODY MASS INDEX: 47.09 KG/M2 | DIASTOLIC BLOOD PRESSURE: 68 MMHG | WEIGHT: 293 LBS | HEIGHT: 66 IN

## 2022-06-10 DIAGNOSIS — Z34.81 PRENATAL CARE, SUBSEQUENT PREGNANCY, FIRST TRIMESTER: ICD-10-CM

## 2022-06-10 DIAGNOSIS — O34.219 HISTORY OF CESAREAN DELIVERY, ANTEPARTUM: ICD-10-CM

## 2022-06-10 DIAGNOSIS — Z36.82 ENCOUNTER FOR (NT) NUCHAL TRANSLUCENCY SCAN: Primary | ICD-10-CM

## 2022-06-10 DIAGNOSIS — Z3A.13 13 WEEKS GESTATION OF PREGNANCY: ICD-10-CM

## 2022-06-10 PROCEDURE — 76813 OB US NUCHAL MEAS 1 GEST: CPT | Performed by: OBSTETRICS & GYNECOLOGY

## 2022-06-10 PROCEDURE — 76801 OB US < 14 WKS SINGLE FETUS: CPT | Performed by: OBSTETRICS & GYNECOLOGY

## 2022-06-10 PROCEDURE — 99212 OFFICE O/P EST SF 10 MIN: CPT | Performed by: OBSTETRICS & GYNECOLOGY

## 2022-06-10 NOTE — LETTER
Lola 10, 2022     Alee Barahona, [facility]    Patient: Abiola Seay   YOB: 1995   Date of Visit: 6/10/2022       Dear Ayah Alva: Thank you for referring Abiola Seay to me for evaluation  Below are my notes for this consultation  If you have questions, please do not hesitate to call me  I look forward to following your patient along with you  Sincerely,        Jewell Herbert MD        CC: No Recipients  Jewell Herbert MD  6/10/2022 10:04 AM  Sign when Signing Visit  [facility]: Ms Raysa Burger was seen today at 13w5d for nuchal translucency ultrasound  See ultrasound report under "OB Procedures" tab  My recommendations are as follows:  1  We reviewed the availability of genetic screening, as well as diagnostic testing, which are available to all pregnant women  We reviewed limitations, risks, and benefits of screening and testing  She elected to proceed with Non Invasive Prenatal Screening (NIPS)  MSAFP screening should be ordered through your office at 15-20 weeks gestation, and completed prior to fetal anatomic survey  She does not wish to pursue diagnostic testing at this time  A detailed anatomic survey as well as transvaginal cervical length screening are recommended between 18-22 weeks gestation  If her Hemoglobin A1C is 6 5 or greater, an early anatomic survey is advised, which we discussed  Fetal echocardiography is advised at 22-24 weeks given early GDM diagnosis  2  Body mass index > 30 kg/m2 is associated with an increased risk of several pregnancy complications, including hypertensive disorders, diabetes, abnormal fetal growth, fetal malformations  The risk of  delivery is also increased, as are wound complications in the event of  delivery   A healthy diet and exercise, as well as appropriate gestational weight gain (no more than 11-20 pounds) can help reduce risk of these complications  150 minutes of moderate exercise per week is recommended for all pregnant women  Nutrition counseling is also available if desired   fetal surveillance is also recommended as follows:  Pre-Pregnancy BMI >40: Evaluation of fetal growth per GDM protocol (see #3), and antepartum fetal surveillance once weekly beginning at 34 weeks gestation  3  We discussed her diet-controlled gestational diabetes (GDMA1)  We reviewed the importance of glycemic control in promoting normal fetal growth and well-being  Goal blood glucose ranges in pregnancy are fasting glucose <90 and two hour post-prandial <120  Exercise is encouraged, and improves glycemic control  A diabetic diet is also recommended, and nutrition counseling is integrated into education and follow-up through our program  If she remains well controlled with diet, we recommend only serial ultrasound evaluation of fetal growth and amniotic fluid every 4-6 weeks (beginning at 28 weeks gestation)  Should she require medical management of blood glucose, antepartum fetal surveillance should also be initiated twice weekly (at diagnosis or 32 weeks, whichever is later) until delivery  For well-controlled GDMA1, delivery is recommended between 39 0/7 and 40 6/7 weeks gestation  If she goes past her EDC, antepartum fetal surveillance should be initiated    I recommend that she continue to be closely followed by our diabetes in pregnancy program     Please don't hesitate to contact our office with any concerns or questions     -Ama Stevenson MD

## 2022-06-10 NOTE — PROGRESS NOTES
Patient chose to have [facility] Non-invasive Prenatal Screen  Patient given brochure and is aware [facility] will contact patients insurance and coordinate coverage  Patient made aware she will need to respond to text message or e-mail from [employer] within 2 business days or testing will be run through insurance  Patient informed text message will come from area code  "415"  Provided [address] [place] [address] # [phone] and web site : [email or web address]  Unable to obtain specimen after one attempt, pt said she was a hard stick and wanted to go to the [facility] lab  [facility] Kit provided with instructiob  Copy of lab order scanned to Epic media  Maternal Fetal Medicine will have results in approximately 7-10 business days and will call patient or notify via [address]  Patient aware viewing lab result online will reveal fetal sex If ordered  Patient verbalized understanding of all instructions and no questions at this time

## 2022-06-10 NOTE — PATIENT INSTRUCTIONS
Please refer to " Ultrasound" under test results in [employer] for today's ultrasound findings  Congratulations, and best wishes for a healthy remainder of the pregnancy! You elected to have non-invasive screening for genetic syndrome performed for your pregnancy  This involves a blood test to check for the four most common genetic syndromes (Trisomy 21, Trisomy 13, Trisomy 25, and sex chromosome abnormalities)  It also will report the biologic sex of the fetus  Results will be visible in your naaya portal 7-10 business days from when the test is drawn  Please follow all instructions regarding determining out of pocket cost for the test (as provided by our nursing staff today), as well as follow any instructions regarding need for prior authorization before having the test drawn  Please contact our office with any concerns or questions  You will need spina bifida screening (called MSAFP) for the baby beginning at 15 weeks gestation, which will be ordered by your obstetrician's office  This test allows for earlier detection of spina bifida than is possible by ultrasound, and is advised in all pregnancies  Thank you for choosing [address] [place] [address] for your visit today  We appreciate your trust and the opportunity to assist your obstetrician with your care  We value your feedback regarding the care we are providing  Following today's appointment, you may receive a patient satisfaction survey by mail or e-mail requesting feedback on your visit  We ask that you complete the survey to  help us understand how we are doing  Thank you for in advance for your feedback

## 2022-06-10 NOTE — PROGRESS NOTES
[address]: Ms Flavia Hill was seen today at 13w5d for nuchal translucency ultrasound  See ultrasound report under "OB Procedures" tab  My recommendations are as follows:  1  We reviewed the availability of genetic screening, as well as diagnostic testing, which are available to all pregnant women  We reviewed limitations, risks, and benefits of screening and testing  She elected to proceed with Non Invasive Prenatal Screening (NIPS)  MSAFP screening should be ordered through your office at 15-20 weeks gestation, and completed prior to fetal anatomic survey  She does not wish to pursue diagnostic testing at this time  A detailed anatomic survey as well as transvaginal cervical length screening are recommended between 18-22 weeks gestation  If her Hemoglobin A1C is 6 5 or greater, an early anatomic survey is advised, which we discussed  Fetal echocardiography is advised at 22-24 weeks given early GDM diagnosis  2  Body mass index > 30 kg/m2 is associated with an increased risk of several pregnancy complications, including hypertensive disorders, diabetes, abnormal fetal growth, fetal malformations  The risk of  delivery is also increased, as are wound complications in the event of  delivery  A healthy diet and exercise, as well as appropriate gestational weight gain (no more than 11-20 pounds) can help reduce risk of these complications  150 minutes of moderate exercise per week is recommended for all pregnant women  Nutrition counseling is also available if desired   fetal surveillance is also recommended as follows:  Pre-Pregnancy BMI >40: Evaluation of fetal growth per GDM protocol (see #3), and antepartum fetal surveillance once weekly beginning at 34 weeks gestation  3  We discussed her diet-controlled gestational diabetes (GDMA1)  We reviewed the importance of glycemic control in promoting normal fetal growth and well-being   Goal blood glucose ranges in pregnancy are fasting glucose <90 and two hour post-prandial <120  Exercise is encouraged, and improves glycemic control  A diabetic diet is also recommended, and nutrition counseling is integrated into education and follow-up through our program  If she remains well controlled with diet, we recommend only serial ultrasound evaluation of fetal growth and amniotic fluid every 4-6 weeks (beginning at 28 weeks gestation)  Should she require medical management of blood glucose, antepartum fetal surveillance should also be initiated twice weekly (at diagnosis or 32 weeks, whichever is later) until delivery  For well-controlled GDMA1, delivery is recommended between 39 0/7 and 40 6/7 weeks gestation  If she goes past her EDC, antepartum fetal surveillance should be initiated    I recommend that she continue to be closely followed by our diabetes in pregnancy program     Please don't hesitate to contact our office with any concerns or questions     -Hoda Simpson MD

## 2022-06-11 ENCOUNTER — APPOINTMENT (OUTPATIENT)
Dept: LAB | Facility: HOSPITAL | Age: 27
End: 2022-06-11
Payer: COMMERCIAL

## 2022-06-11 DIAGNOSIS — Z3A.13 13 WEEKS GESTATION OF PREGNANCY: ICD-10-CM

## 2022-06-11 DIAGNOSIS — O99.211 MATERNAL MORBID OBESITY IN FIRST TRIMESTER, ANTEPARTUM (HCC): ICD-10-CM

## 2022-06-11 DIAGNOSIS — O24.419 GESTATIONAL DIABETES MELLITUS (GDM) IN FIRST TRIMESTER, GESTATIONAL DIABETES METHOD OF CONTROL UNSPECIFIED: ICD-10-CM

## 2022-06-11 DIAGNOSIS — Z34.81 PRENATAL CARE, SUBSEQUENT PREGNANCY, FIRST TRIMESTER: ICD-10-CM

## 2022-06-11 DIAGNOSIS — E66.01 MATERNAL MORBID OBESITY IN FIRST TRIMESTER, ANTEPARTUM (HCC): ICD-10-CM

## 2022-06-11 LAB
ALBUMIN SERPL BCP-MCNC: 2.6 G/DL (ref 3.5–5)
ALP SERPL-CCNC: 98 U/L (ref 46–116)
ALT SERPL W P-5'-P-CCNC: 45 U/L (ref 12–78)
ANION GAP SERPL CALCULATED.3IONS-SCNC: 9 MMOL/L (ref 4–13)
AST SERPL W P-5'-P-CCNC: 25 U/L (ref 5–45)
BILIRUB SERPL-MCNC: 0.36 MG/DL (ref 0.2–1)
BUN SERPL-MCNC: 11 MG/DL (ref 5–25)
CALCIUM ALBUM COR SERPL-MCNC: 10 MG/DL (ref 8.3–10.1)
CALCIUM SERPL-MCNC: 8.9 MG/DL (ref 8.3–10.1)
CHLORIDE SERPL-SCNC: 99 MMOL/L (ref 100–108)
CO2 SERPL-SCNC: 27 MMOL/L (ref 21–32)
CREAT SERPL-MCNC: 0.52 MG/DL (ref 0.6–1.3)
EST. AVERAGE GLUCOSE BLD GHB EST-MCNC: 91 MG/DL
GFR SERPL CREATININE-BSD FRML MDRD: 131 ML/MIN/1.73SQ M
GLUCOSE P FAST SERPL-MCNC: 96 MG/DL (ref 65–99)
HBA1C MFR BLD: 4.8 %
POTASSIUM SERPL-SCNC: 4.4 MMOL/L (ref 3.5–5.3)
PROT SERPL-MCNC: 6.9 G/DL (ref 6.4–8.2)
SODIUM SERPL-SCNC: 135 MMOL/L (ref 136–145)

## 2022-06-11 PROCEDURE — 36415 COLL VENOUS BLD VENIPUNCTURE: CPT | Performed by: NURSE PRACTITIONER

## 2022-06-11 PROCEDURE — 81329 SMN1 GENE DOS/DELETION ALYS: CPT

## 2022-06-11 PROCEDURE — 83036 HEMOGLOBIN GLYCOSYLATED A1C: CPT | Performed by: NURSE PRACTITIONER

## 2022-06-11 PROCEDURE — 80053 COMPREHEN METABOLIC PANEL: CPT

## 2022-06-14 LAB
Lab: NORMAL
Lab: NORMAL
MISCELLANEOUS LAB TEST RESULT: NORMAL

## 2022-06-22 ENCOUNTER — OFFICE VISIT (OUTPATIENT)
Dept: PERINATAL CARE | Facility: CLINIC | Age: 27
End: 2022-06-22
Payer: COMMERCIAL

## 2022-06-22 VITALS
BODY MASS INDEX: 47.09 KG/M2 | DIASTOLIC BLOOD PRESSURE: 75 MMHG | WEIGHT: 293 LBS | HEART RATE: 85 BPM | SYSTOLIC BLOOD PRESSURE: 114 MMHG | HEIGHT: 66 IN

## 2022-06-22 DIAGNOSIS — E66.01 MATERNAL MORBID OBESITY IN SECOND TRIMESTER, ANTEPARTUM (HCC): ICD-10-CM

## 2022-06-22 DIAGNOSIS — O99.212 MATERNAL MORBID OBESITY IN SECOND TRIMESTER, ANTEPARTUM (HCC): ICD-10-CM

## 2022-06-22 DIAGNOSIS — Z3A.15 15 WEEKS GESTATION OF PREGNANCY: ICD-10-CM

## 2022-06-22 DIAGNOSIS — O24.410 DIET CONTROLLED GESTATIONAL DIABETES MELLITUS (GDM) IN SECOND TRIMESTER: Primary | ICD-10-CM

## 2022-06-22 PROCEDURE — G0108 DIAB MANAGE TRN  PER INDIV: HCPCS | Performed by: DIETITIAN, REGISTERED

## 2022-06-22 NOTE — PROGRESS NOTES
Thank you for referring your patient to [facility] Maternal Fetal Medicine Diabetes in Pregnancy Program      Sebas Sales is a  31 y o  female who presents today for Individual  Class 2--diet only  Patient is at 15w3d gestation, Estimated Date of Delivery: 22  Reviewed and updated the following from patients medical record: PMH, Problem List, Allergies, and Current Medications  Visit Diagnosis:  Diet controlled GDM    Additional Pregnancy Complications:  Obesity    Labs:    Lab Results   Component Value Date    [id number] 173 (H) 2022 3 hour GTT-FBS-100    Lab Results   Component Value Date    GLUF 96 2022 QzF4l-1 8%    Medications:  No diabetes related medications  Desires to avoid medication as long as possible  Anthropometrics:  Ht Readings from Last 3 Encounters:   22 5' 6" (1 676 m)   06/10/22 5' 6" (1 676 m)   22 5' 6" (1 676 m)     Wt Readings from Last 3 Encounters:   22 (!) 138 kg (303 lb 12 8 oz)   06/10/22 (!) 137 kg (303 lb)   22 (!) 140 kg (308 lb 3 2 oz)     Pre-gravid weight: 139 kg (307 lb)  Pre-gravid BMI: 49 57  Weight Change: -1 814 kg (-4 lb)  Weight gain recommendations: BMI (> 30) 11-20 lbs  Comments: Patient needs to maintain weight for the remainder of the pregnancy  Recent Ultra Sound Results:  Date:6/10/22  Fetal Growth:Normal  AURA: Normal  Next US date: 22    Blood Glucose Monitoring:  Reinforcement of blood glucose goals and reporting guidelines       Glucose Meter: OneTouch Verio Flex  Testing blood sugars: 4 x per day (Fasting, 2 hour after start of each meal)  Method of reporting blood sugars:Glucose Flowsheet    Report blood sugar results weekly via:  Phone: [phone]   OR  My Chart (Message with image attachment, or Glucose Flowsheet)    Goal Blood Sugar Ranges:   Fastin-90 mg/dL  1 hour after the start of each meal: 140 mg/dL or less  2 hours after start of each meal: 120 mg/dL or less      BG Log:  Review of blood glucose log  Discussed at Class 2 today       Many missing results due to her job requirements as a         Meal Plan:  Calories: 2000 calorie (WQR:[id number]) (PRO: 3-1-3/4-1-3/-2) now & 2200 calories when more hungry  Diet Type: Low Carbohydrate  Additional Nutrition concerns: Admits to being a picky eater;;dislikes tuna, hard boiled eggs, rye bread & water  Discussed ways to enhance the flavor of water  24 hr Diet Recall:  Provided at this class    Diet review: Has difficulty following the meal plan as works [de-identified] or more a week in her 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)   When is awake for longer than 24 hours que all night--advised to eat 1 oz protein at different times during the night with raw vegetables  Stated she arises at 6 AM when can go home, but cannot tolerate food till 9 AM which is her first meal of the day  Discussed many examples of breakfast meals & between meal snacks  States she often skips the bedtime snack as too tired to stay awake  Stated she sometimes does not have 8 hours fast overnight  Also, stated her FBS has been high since she is drinking a regular soda  Or a regular Coolatta from [employer]  Discussed better beverage options  Diet Instruction:  The patient was instructed on the followin  Individualized meal plan  2  Importance of consistent carbohydrate intake via 3 meals and 3 snacks per day   3  Importance of protein as it relates to blood glucose control  4  Encouraged  patient to eat every 2 0-3 5 hours while awake  5  Encouraged patient to go no longer than 8-10 hours fasting overnight until first meal of the day  6  Provided suggested meal/snack options to increase nutrition and maintain consistent meal and snack intakes        Physical Activity:  Discussed benefits of physical activity to optimize blood glucose control, encouraged activity at patient is physically able  Always consult a physician prior to starting an exercise program  Recommend 20-30 minutes daily  Is patient physically active? Yes sometimes  Patient Stated Goal: "I will plan my meals and snacks every day"  Goal Assessment: Not on track    Diabetes Self Management Support Plan outside of ongoing care: Spouse/Family    Learner/s Present:Learners Present: Patient   Barriers to Learning/Change: Her work responsibilities  Expected Compliance: good    Date to report blood sugars: Weekly  Follow up date: Wednesday, 7/20/22 to review diet & complete Class 2    Begin Time: 10:25 AM  End Time: 11:50 AM    It was a pleasure working with them today  Please feel free to call with any questions or concerns      Jennifer Bhatt  Diabetes Educator  [facility] Maternal Fetal Medicine  Diabetes in Pregnancy Program  [facility], [address] [place] [address],[address]  [place], [address] [place] [address]

## 2022-06-27 ENCOUNTER — ROUTINE PRENATAL (OUTPATIENT)
Dept: OBGYN CLINIC | Facility: CLINIC | Age: 27
End: 2022-06-27

## 2022-06-27 VITALS — WEIGHT: 293 LBS | BODY MASS INDEX: 49.39 KG/M2 | SYSTOLIC BLOOD PRESSURE: 110 MMHG | DIASTOLIC BLOOD PRESSURE: 70 MMHG

## 2022-06-27 DIAGNOSIS — O99.211 MATERNAL MORBID OBESITY IN FIRST TRIMESTER, ANTEPARTUM (HCC): ICD-10-CM

## 2022-06-27 DIAGNOSIS — Z34.82 PRENATAL CARE, SUBSEQUENT PREGNANCY, SECOND TRIMESTER: Primary | ICD-10-CM

## 2022-06-27 DIAGNOSIS — Z3A.13 13 WEEKS GESTATION OF PREGNANCY: ICD-10-CM

## 2022-06-27 DIAGNOSIS — O24.410 DIET CONTROLLED GESTATIONAL DIABETES MELLITUS (GDM) IN SECOND TRIMESTER: ICD-10-CM

## 2022-06-27 DIAGNOSIS — E66.01 MATERNAL MORBID OBESITY IN FIRST TRIMESTER, ANTEPARTUM (HCC): ICD-10-CM

## 2022-06-27 PROCEDURE — PNV: Performed by: NURSE PRACTITIONER

## 2022-06-27 NOTE — PROGRESS NOTES
OFFICE VISIT: Denies any N/V, HA, Cramping, VB, LOF, Edema, Domestic Violence, Smoking  No FM yet  Tolerating PNV  Did see diabetes management program and is monitoring glucose  Scheduled for fetal echo around 22-24 wks due to GDM  Has anatomy scan scheduled with MFM  Normal NIPS results  It's a GIRL! Rx for AFP given today  RTO 4 weeks or sooner as needed

## 2022-07-01 LAB — SCAN RESULT: NORMAL

## 2022-07-05 ENCOUNTER — APPOINTMENT (OUTPATIENT)
Dept: LAB | Facility: HOSPITAL | Age: 27
End: 2022-07-05
Payer: COMMERCIAL

## 2022-07-05 DIAGNOSIS — Z34.82 PRENATAL CARE, SUBSEQUENT PREGNANCY, SECOND TRIMESTER: ICD-10-CM

## 2022-07-05 DIAGNOSIS — O24.410 DIET CONTROLLED GESTATIONAL DIABETES MELLITUS (GDM) IN SECOND TRIMESTER: ICD-10-CM

## 2022-07-05 PROCEDURE — 82105 ALPHA-FETOPROTEIN SERUM: CPT

## 2022-07-05 PROCEDURE — 36415 COLL VENOUS BLD VENIPUNCTURE: CPT

## 2022-07-07 LAB
2ND TRIMESTER 4 SCREEN SERPL-IMP: NORMAL
AFP ADJ MOM SERPL: 0.58
AFP INTERP AMN-IMP: NORMAL
AFP INTERP SERPL-IMP: NORMAL
AFP INTERP SERPL-IMP: NORMAL
AFP SERPL-MCNC: 16.1 NG/ML
AGE AT DELIVERY: 28.1 YR
GA METHOD: NORMAL
GA: 17.3 WEEKS
IDDM PATIENT QL: NO
MULTIPLE PREGNANCY: NO
NEURAL TUBE DEFECT RISK FETUS: NORMAL %

## 2022-07-20 ENCOUNTER — OFFICE VISIT (OUTPATIENT)
Dept: PERINATAL CARE | Facility: CLINIC | Age: 27
End: 2022-07-20
Payer: COMMERCIAL

## 2022-07-20 VITALS
SYSTOLIC BLOOD PRESSURE: 119 MMHG | DIASTOLIC BLOOD PRESSURE: 81 MMHG | HEIGHT: 66 IN | HEART RATE: 85 BPM | WEIGHT: 293 LBS | BODY MASS INDEX: 47.09 KG/M2

## 2022-07-20 DIAGNOSIS — Z3A.19 19 WEEKS GESTATION OF PREGNANCY: ICD-10-CM

## 2022-07-20 DIAGNOSIS — O24.410 DIET CONTROLLED GESTATIONAL DIABETES MELLITUS (GDM) IN FIRST TRIMESTER: Primary | ICD-10-CM

## 2022-07-20 DIAGNOSIS — O99.212 OBESITY COMPLICATING PREGNANCY IN SECOND TRIMESTER: ICD-10-CM

## 2022-07-20 PROCEDURE — G0108 DIAB MANAGE TRN  PER INDIV: HCPCS | Performed by: DIETITIAN, REGISTERED

## 2022-07-20 RX ORDER — BLOOD-GLUCOSE METER
EACH MISCELLANEOUS
COMMUNITY

## 2022-07-20 NOTE — PROGRESS NOTES
Thank you for referring your patient to [facility] Diabetes in Pregnancy Program      Elkin Car is a  31 y o  female who presents today for Individual  Class 2 completion  Patient is at 19w3d gestation, Estimated Date of Delivery: 22  Reviewed and updated the following from patients medical record: PMH, Problem List, Allergies, and Current Medications  Visit Diagnosis:  Diet controlled GDM    Additional Pregnancy Complications:  Obesity    Labs:    Lab Results   Component Value Date    [id number] 173 (H) 2022       Lab Results   Component Value Date    GLUF 96 2022 YeM5i-3 8%; already reordered  Medications:  No diabetes related medications  Declined insulin pen education at this class  Anthropometrics:  Ht Readings from Last 3 Encounters:   22 5' 6" (1 676 m)   22 5' 6" (1 676 m)   06/10/22 5' 6" (1 676 m)     Wt Readings from Last 3 Encounters:   22 (!) 139 kg (305 lb 9 6 oz)   22 (!) 139 kg (306 lb)   22 (!) 138 kg (303 lb 12 8 oz)     Pre-gravid weight: 139 kg (307 lb)  Pre-gravid BMI: 49 57  Weight Change: -0 454 kg (-1 lb)  Weight gain recommendations: BMI (> 30) 11-20 lbs  Comments: Patient needs to maintain her weight for the remainder of the pregnancy  Recent Ultra Sound Results:  Date:6/10/22  Fetal Growth:Normal  AURA: Normal  Next US date: 22    Blood Glucose Monitoring:  Reinforcement of blood glucose goals and reporting guidelines       Glucose Meter: OneTouch Verio Flex  Testing blood sugars: 4 x per day (Fasting, 2 hour after start of each meal)  Method of reporting blood sugars:Glucose Flowsheet    Report blood sugar results weekly via:  Phone: [phone]   OR  My Chart (Message with image attachment, or Glucose Flowsheet)    Goal Blood Sugar Ranges:   Fastin-90 mg/dL  1 hour after the start of each meal: 140 mg/dL or less  2 hours after start of each meal: 120 mg/dL or less      BG Log:  Review of blood glucose log  Discussed at this class today  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 at the appropriate due to group home responsibilites  Meal Plan:  Calories: 2000 calorie (BSN:[id number]) (PRO: 3-1-3/4-1-3/4-2) C/o not hungry & has not increased to the 2200 calorie meal plan yet  Diet Type: Low Carbohydrate  Additional Nutrition concerns: Reports lots of 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      24 hr Diet Recall:  Provided at this appointment  Diet review: Eats 3 meals & bedtime snack  Misses between meal snacks while working due to work duties  Discussed easy , quick snack ideas  Agreed to eat a protein granola bar for between meal snack  Often has to cover the late shift & eats a late dinner as a result; then the FBS is not a true fasting  When her FBS is increased , she did not have a 8-10 hour fast overnight  Desires to try the diet longer to see if she can continue to be diet controlled before being educated on insulin  Diet Instruction:  The patient was instructed on the followin  Individualized meal plan  2  Importance of consistent carbohydrate intake via 3 meals and 3 snacks per day   3  Importance of protein as it relates to blood glucose control  4  Encouraged  patient to eat every 2 0-3 5 hours while awake  5  Encouraged patient to go no longer than 8-10 hours fasting overnight until first meal of the day  6  Provided suggested meal/snack options to increase nutrition and maintain consistent meal and snack intakes  Physical Activity:  Discussed benefits of physical activity to optimize blood glucose control, encouraged activity at patient is physically able  Always consult a physician prior to starting an exercise program  Recommend 20-30 minutes daily  Is patient physically active? 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       Sick day Guidelines:   Patient advised that sickness will raise blood sugar and need to continue medication regimen as directed  If blood sugar is > 160 mg/dL twice in one day call doctor  Instructed on what to do when unable to consume normal meal plan  Hypoglycemia & Treatment Guidelines:  Reviewed what hypoglycemia is, signs and symptoms, and how to treat via the 15:15 rule  Post-Partum Guidelines:  Completion of 75 gm CHO 2 hr gtt at 6 weeks post-partum to check for Type 2 DM diagnosis    Breastfeeding Guidelines:  Continue GDM meal plan plus additional 350-500 calories daily  Stay hydrated by drinking 8-10 (8 oz ) fluids daily  Examples of protein and carbohydrate snacks provided  Dining Out & Travel Guidelines:  Patient advised to be prepared with extra diabetes supplies, medications, and snacks, as well as sticking to the same time schedule and portions eaten at home for meals and snacks  Maternal-Fetal Testing:    Ultrasounds- growth scans every 4 weeks  NST- twice weekly starting at 32nd week GA   AURA- weekly starting at 32 weeks GA    Patient Stated Goal: "I will plan my meals and snacks every day"  Goal Assessment: Not on track    Diabetes Self Management Support Plan outside of ongoing care: Spouse/Family    Learner/s Present:Learners Present: Patient   Barriers to Learning/Change: situaltional   Expected Compliance: good    Date to report blood sugars: Weekly  Follow up date: Wednesday, 9/7/22    Begin Time: 10:40 AM   End Time: 11:40 AM    It was a pleasure working with them today  Please feel free to call with any questions or concerns      Aminata Erickson  Diabetes Educator  [facility] Maternal Fetal Medicine  Diabetes in Pregnancy Program  [address] [place] [address],[address]  [address]

## 2022-07-24 NOTE — PROGRESS NOTES
Please refer to the [facility] ultrasound report in Ob Procedures for additional information regarding today's visit

## 2022-07-25 ENCOUNTER — ROUTINE PRENATAL (OUTPATIENT)
Dept: PERINATAL CARE | Facility: OTHER | Age: 27
End: 2022-07-25
Payer: COMMERCIAL

## 2022-07-25 VITALS
HEIGHT: 66 IN | DIASTOLIC BLOOD PRESSURE: 90 MMHG | HEART RATE: 86 BPM | SYSTOLIC BLOOD PRESSURE: 128 MMHG | BODY MASS INDEX: 47.09 KG/M2 | WEIGHT: 293 LBS

## 2022-07-25 DIAGNOSIS — O99.212 MATERNAL MORBID OBESITY IN SECOND TRIMESTER, ANTEPARTUM (HCC): ICD-10-CM

## 2022-07-25 DIAGNOSIS — Z3A.20 20 WEEKS GESTATION OF PREGNANCY: ICD-10-CM

## 2022-07-25 DIAGNOSIS — Z36.86 ENCOUNTER FOR ANTENATAL SCREENING FOR CERVICAL LENGTH: ICD-10-CM

## 2022-07-25 DIAGNOSIS — E66.01 MATERNAL MORBID OBESITY IN SECOND TRIMESTER, ANTEPARTUM (HCC): ICD-10-CM

## 2022-07-25 DIAGNOSIS — O24.410 DIET CONTROLLED GESTATIONAL DIABETES MELLITUS (GDM) IN SECOND TRIMESTER: Primary | ICD-10-CM

## 2022-07-25 PROCEDURE — 76811 OB US DETAILED SNGL FETUS: CPT | Performed by: OBSTETRICS & GYNECOLOGY

## 2022-07-25 PROCEDURE — 99214 OFFICE O/P EST MOD 30 MIN: CPT | Performed by: OBSTETRICS & GYNECOLOGY

## 2022-07-25 PROCEDURE — 76817 TRANSVAGINAL US OBSTETRIC: CPT | Performed by: OBSTETRICS & GYNECOLOGY

## 2022-07-25 NOTE — PROGRESS NOTES
Ultrasound Probe Disinfection    A transvaginal ultrasound was performed  Prior to use, disinfection was performed with High Level Disinfection Process (Trophon)  Probe serial number A2: [id number] was used        Deidre Sorenson  07/25/22  1:46 PM

## 2022-07-25 NOTE — LETTER
July 25, 2022     Murtaza Seals, [address] [address]    Patient: Leonardo Stearns   YOB: 1995   Date of Visit: 7/25/2022       Dear Dr Wade Myers: Thank you for referring Leonardo Stearns to me for evaluation  Below are my notes for this consultation  If you have questions, please do not hesitate to call me  I look forward to following your patient along with you  Sincerely,        Kun Soto MD        CC: No Recipients  Katiana December 7/25/2022  1:46 PM  Sign when Signing Visit  Ultrasound Probe Disinfection    A transvaginal ultrasound was performed  Prior to use, disinfection was performed with High Level Disinfection Process (Trophon)  Probe serial number A2: [id number] was used  Katiana December 07/25/22  1:46 PM    Kun Soto MD  7/24/2022 12:27 PM  Sign when Signing Visit  Please refer to the [facility] ultrasound report in Ob Procedures for additional information regarding today's visit

## 2022-07-29 ENCOUNTER — ROUTINE PRENATAL (OUTPATIENT)
Dept: OBGYN CLINIC | Facility: CLINIC | Age: 27
End: 2022-07-29

## 2022-07-29 VITALS — DIASTOLIC BLOOD PRESSURE: 78 MMHG | BODY MASS INDEX: 49.55 KG/M2 | WEIGHT: 293 LBS | SYSTOLIC BLOOD PRESSURE: 112 MMHG

## 2022-07-29 DIAGNOSIS — Z34.82 PRENATAL CARE, SUBSEQUENT PREGNANCY, SECOND TRIMESTER: Primary | ICD-10-CM

## 2022-07-29 PROCEDURE — PNV: Performed by: NURSE PRACTITIONER

## 2022-07-29 NOTE — PROGRESS NOTES
OFFICE VISIT: Denies any N/V, HA, Cramping, VB, LOF, Edema, Domestic Violence, Smoking  + FM  Tolerating PNV  Continuing to follow with diabetes management program  Had anatomy scan  Scheduled for fetal echo  Urine neg/neg  RTO 4 weeks or sooner as needed

## 2022-08-25 ENCOUNTER — ROUTINE PRENATAL (OUTPATIENT)
Dept: PERINATAL CARE | Facility: OTHER | Age: 27
End: 2022-08-25
Payer: COMMERCIAL

## 2022-08-25 VITALS
WEIGHT: 293 LBS | BODY MASS INDEX: 47.09 KG/M2 | DIASTOLIC BLOOD PRESSURE: 74 MMHG | SYSTOLIC BLOOD PRESSURE: 120 MMHG | HEART RATE: 92 BPM | HEIGHT: 66 IN

## 2022-08-25 DIAGNOSIS — O34.219 HISTORY OF CESAREAN DELIVERY, ANTEPARTUM: ICD-10-CM

## 2022-08-25 DIAGNOSIS — E66.01 MATERNAL MORBID OBESITY IN FIRST TRIMESTER, ANTEPARTUM (HCC): ICD-10-CM

## 2022-08-25 DIAGNOSIS — O24.410 DIET CONTROLLED GESTATIONAL DIABETES MELLITUS (GDM) IN SECOND TRIMESTER: Primary | ICD-10-CM

## 2022-08-25 DIAGNOSIS — Z3A.24 24 WEEKS GESTATION OF PREGNANCY: ICD-10-CM

## 2022-08-25 DIAGNOSIS — O99.211 MATERNAL MORBID OBESITY IN FIRST TRIMESTER, ANTEPARTUM (HCC): ICD-10-CM

## 2022-08-25 PROCEDURE — 93325 DOPPLER ECHO COLOR FLOW MAPG: CPT | Performed by: OBSTETRICS & GYNECOLOGY

## 2022-08-25 PROCEDURE — 76816 OB US FOLLOW-UP PER FETUS: CPT | Performed by: OBSTETRICS & GYNECOLOGY

## 2022-08-25 PROCEDURE — 76825 ECHO EXAM OF FETAL HEART: CPT | Performed by: OBSTETRICS & GYNECOLOGY

## 2022-08-25 PROCEDURE — 99213 OFFICE O/P EST LOW 20 MIN: CPT | Performed by: OBSTETRICS & GYNECOLOGY

## 2022-08-25 PROCEDURE — 76827 ECHO EXAM OF FETAL HEART: CPT | Performed by: OBSTETRICS & GYNECOLOGY

## 2022-08-25 NOTE — PROGRESS NOTES
Shantell Velarde  has no complaints today at 24w4d  She reports fetal movements and does not report any vaginal bleeding or signs of labor  She is here today for fetal echo and an ultrasound to review the rest of her missed anatomy  Problem list:  1  Prior   2  GDM A1-she is reporting fasting blood sugars well but she has limited 2 hour postprandial blood sugars reported  3  Pre gravid BMI of 49  4  Long interval pregnancy    Ultrasound findings: The ultrasound today shows a normal fetal echo  We reviewed the missed anatomy and no malformations are detected  Would continue to have limited views of the left hand, ankles and both bottoms of the feet secondary to fetal position and maternal skin thickness    Pregnancy ultrasound has limitations and is unable to detect all forms of fetal congenital abnormalities  Specific counseling was provided on the following problems:  1  She is having a hard time monitoring her 2 hour postprandial blood sugars secondary to her job  I gave her the option of switching to 1 hour post prandials and she thought this might be able to be done easier with her work  We reviewed that 1 hour post prandials are elevated if they are greater than 140  Follow up recommended:   1  Recommend a follow-up ultrasound for growth in 4 weeks along with a review of the missed anatomy  Pre visit time reviewing her records   5 minutes  Face to face time 10 minutes  Post visit time on documentation of note, updating her problem list, adding orders and prescriptions 5 minutes  Procedures that were completed today were charged separately  The level of decision making was low level complexity      Yunior Penn MD

## 2022-08-25 NOTE — LETTER
2022     Caitlin Theresa, [address] [facility]    Patient: Tacos Kim   YOB: 1995   Date of Visit: 2022       Dear Dr Afshin Franklin: Thank you for referring Tcaos Kim to me for evaluation  Below are my notes for this consultation  If you have questions, please do not hesitate to call me  I look forward to following your patient along with you  Sincerely,        Nissa Merrill MD        CC: No Recipients  Nissa Merrill MD  2022  3:06 PM  Sign when Signing Visit  Tacos Kim  has no complaints today at 24w4d  She reports fetal movements and does not report any vaginal bleeding or signs of labor  She is here today for fetal echo and an ultrasound to review the rest of her missed anatomy  Problem list:  1  Prior   2  GDM A1-she is reporting fasting blood sugars well but she has limited 2 hour postprandial blood sugars reported  3  Pre gravid BMI of 49  4  Long interval pregnancy    Ultrasound findings: The ultrasound today shows a normal fetal echo  We reviewed the missed anatomy and no malformations are detected  Would continue to have limited views of the left hand, ankles and both bottoms of the feet secondary to fetal position and maternal skin thickness    Pregnancy ultrasound has limitations and is unable to detect all forms of fetal congenital abnormalities  Specific counseling was provided on the following problems:  1  She is having a hard time monitoring her 2 hour postprandial blood sugars secondary to her job  I gave her the option of switching to 1 hour post prandials and she thought this might be able to be done easier with her work  We reviewed that 1 hour post prandials are elevated if they are greater than 140  Follow up recommended:   1  Recommend a follow-up ultrasound for growth in 4 weeks along with a review of the missed anatomy       Pre visit time reviewing her records   5 minutes  Face to face time 10 minutes  Post visit time on documentation of note, updating her problem list, adding orders and prescriptions 5 minutes  Procedures that were completed today were charged separately  The level of decision making was low level complexity      Pia Nash MD

## 2022-08-25 NOTE — Clinical Note
Juju Ng was checking fasting blood sugars well but not her 2 hour post prandials and she states it secondary to her limited time on her job  She feels she can be more compliant if she is able to check 1 hour post prandials so I wanted to share this information  From this point on she will be checking 1 hour post prandials     Jared Young

## 2022-08-26 ENCOUNTER — ROUTINE PRENATAL (OUTPATIENT)
Dept: OBGYN CLINIC | Facility: CLINIC | Age: 27
End: 2022-08-26
Payer: COMMERCIAL

## 2022-08-26 ENCOUNTER — TELEPHONE (OUTPATIENT)
Dept: OBGYN CLINIC | Facility: CLINIC | Age: 27
End: 2022-08-26

## 2022-08-26 VITALS — WEIGHT: 293 LBS | DIASTOLIC BLOOD PRESSURE: 84 MMHG | BODY MASS INDEX: 49.62 KG/M2 | SYSTOLIC BLOOD PRESSURE: 120 MMHG

## 2022-08-26 DIAGNOSIS — Z3A.24 24 WEEKS GESTATION OF PREGNANCY: ICD-10-CM

## 2022-08-26 DIAGNOSIS — Z34.93 PRENATAL CARE IN THIRD TRIMESTER: Primary | ICD-10-CM

## 2022-08-26 PROCEDURE — PNV: Performed by: STUDENT IN AN ORGANIZED HEALTH CARE EDUCATION/TRAINING PROGRAM

## 2022-08-26 PROCEDURE — 99213 OFFICE O/P EST LOW 20 MIN: CPT | Performed by: STUDENT IN AN ORGANIZED HEALTH CARE EDUCATION/TRAINING PROGRAM

## 2022-08-26 NOTE — PATIENT INSTRUCTIONS
Pregnancy at 23 to 26 Weeks   AMBULATORY CARE:   What changes are happening to your body: You are now close to or at the beginning of the third trimester  The third trimester starts at 24 weeks and ends with delivery  As your baby gets larger, you may develop certain symptoms  These may include pain in your back or down the sides of your abdomen  You may also have stretch marks on your abdomen, breasts, thighs, or buttocks  You may also have constipation  Seek care immediately if:   You develop a severe headache that does not go away  You have new or increased vision changes, such as blurred or spotted vision  You have new or increased swelling in your face or hands  You have vaginal spotting or bleeding  Your water broke or you feel warm water gushing or trickling from your vagina  Call your doctor or obstetrician if:   You have abdominal cramps, pressure, or tightening  You have a change in vaginal discharge  You have light bleeding  You have chills or a fever  You have vaginal itching, burning, or pain  You have yellow, green, white, or foul-smelling vaginal discharge  You have pain or burning when you urinate, less urine than usual, or pink or bloody urine  You have questions or concerns about your condition or care  How to care for yourself at this stage of your pregnancy:       Eat a variety of healthy foods  Healthy foods include fruits, vegetables, whole-grain breads, low-fat dairy foods, beans, lean meats, and fish  Drink liquids as directed  Ask how much liquid to drink each day and which liquids are best for you  Limit caffeine to less than 200 milligrams each day  Limit your intake of fish to 2 servings each week  Choose fish low in mercury such as canned light tuna, shrimp, salmon, cod, or tilapia  Do not  eat fish high in mercury such as swordfish, tilefish, flavio mackerel, and shark  Manage back pain    Do not stand for long periods of time or lift heavy items  Use good posture while you stand, squat, or bend  Wear low-heeled shoes with good support  Rest may also help to relieve back pain  Ask your healthcare provider about exercises you can do to strengthen your back muscles  Take prenatal vitamins as directed  Your need for certain vitamins and minerals, such as folic acid, increases during pregnancy  Prenatal vitamins provide some of the extra vitamins and minerals you need  Prenatal vitamins may also help to decrease the risk of certain birth defects  Talk to your healthcare provider about exercise  Moderate exercise can help you stay fit  Your healthcare provider will help you plan an exercise program that is safe for you during pregnancy  Do not smoke  Smoking increases your risk of a miscarriage and other health problems during your pregnancy  Smoking can cause your baby to be born too early or weigh less at birth  Ask your healthcare provider for information if you need help quitting  Do not drink alcohol  Alcohol passes from your body to your baby through the placenta  It can affect your baby's brain development and cause fetal alcohol syndrome (FAS)  FAS is a group of conditions that causes mental, behavior, and growth problems  Talk to your healthcare provider before you take any medicines  Many medicines may harm your baby if you take them when you are pregnant  Do not take any medicines, vitamins, herbs, or supplements without first talking to your healthcare provider  Never use illegal or street drugs (such as marijuana or cocaine) while you are pregnant  Safety tips during pregnancy:   Avoid hot tubs and saunas  Do not use a hot tub or sauna while you are pregnant, especially during your first trimester  Hot tubs and saunas may raise your baby's temperature and increase the risk of birth defects  Avoid toxoplasmosis  This is an infection caused by eating raw meat or being around infected cat feces   It can cause birth defects, miscarriages, and other problems  Wash your hands after you touch raw meat  Make sure any meat is well-cooked before you eat it  Avoid raw eggs and unpasteurized milk  Use gloves or ask someone else to clean your cat's litter box while you are pregnant  Changes that are happening with your baby:  By 26 weeks, your baby will weigh about 2 pounds  Your baby will be about 10 inches long from the top of the head to the rump (baby's bottom)  Your baby's movements are much stronger now  Your baby's eyes are almost completely formed and can partially open  Your baby also sleeps and wakes up  What you need to know about prenatal care: Your healthcare provider will check your blood pressure and weight  You may also need the following:  A urine test  may also be done to check for sugar and protein  These can be signs of gestational diabetes or infection  Protein in your urine may also be a sign of preeclampsia  Preeclampsia is a condition that can develop during week 20 or later of your pregnancy  It causes high blood pressure, and it can cause problems with your kidneys and other organs  A gestational diabetes screen  may be done  Your healthcare provider may order either a 1-step or 2-step oral glucose tolerance test (OGTT)  1-step OGTT:  Your blood sugar level will be tested after you have not eaten for 8 hours (fasting)  You will then be given a glucose drink  Your level will be tested again 1 hour and 2 hours after you finish the drink  2-step OGTT:  You do not have to fast for the first part of the test  You will have the glucose drink at any time of day  Your blood sugar level will be checked 1 hour later  If your blood sugar is higher than a certain level, another test will be ordered  You will fast and your blood sugar level will be tested  You will have the glucose drink  Your blood will be tested again 1 hour, 2 hours, and 3 hours after you finish the glucose drink      Fundal height is a measurement of your uterus to check your baby's growth  This number is usually the same as the number of weeks that you have been pregnant  Your baby's heart rate  will be checked  Follow up with your doctor or obstetrician as directed:  Write down your questions so you remember to ask them during your visits  © Copyright [employer] 2022 Information is for End User's use only and may not be sold, redistributed or otherwise used for commercial purposes  All illustrations and images included in CareNotes® are the copyrighted property of [employer]  or [facility] Neda [facility]   The above information is an  only  It is not intended as medical advice for individual conditions or treatments  Talk to your doctor, nurse or pharmacist before following any medical regimen to see if it is safe and effective for you

## 2022-08-26 NOTE — TELEPHONE ENCOUNTER
Scheduled c/s 12/5 @ 10am   On pink sticky, staff message sent and on calendar    mychart message sent to patient

## 2022-08-26 NOTE — LETTER
[facility] [address]  [place] [address]  [phone]  Dept: [phone]    August 26, 2022     Patient: Johanna Thibodeaux   YOB: 1995   Date of Visit: 8/26/2022       To Whom it May Concern:    Johanna Thibodeaux is under my professional care  She has a delivery date scheduled for 12/5/20222  [facility] allows maternity leave 4 weeks prior to delivery date  If you have any questions or concerns, please don't hesitate to call           Sincerely,          Ravinder Alas MD

## 2022-08-26 NOTE — PROGRESS NOTES
Opal Hensley is a 32 yo  at 24w5d presenting for routine PNC- denies any CTX, LOF or VB  Endorses good FM  Urine trace protein/ negative glucose  Very anxious about developing preeclampsia- reviewed signs and sx  Third trimester labs given  Reports FS have been going well but because of work sometimes it is difficult to stick with the strict FS and meal guidelines  Encouraged her to do her best  She desires repeat  section- office tasked to schedule at 39 weeks  Work note given for delivery  RTO in 4 weeks or sooner if needed

## 2022-08-26 NOTE — TELEPHONE ENCOUNTER
----- Message from Tawana Landeros MD sent at 2022  8:26 AM EDT -----  Can you please schedule Joni Tanya for repeat low transverse  section on 2022- GDM and prior  x1  Thank you!   Tawana Landeros MD

## 2022-09-06 ENCOUNTER — TELEPHONE (OUTPATIENT)
Dept: OBGYN CLINIC | Facility: CLINIC | Age: 27
End: 2022-09-06

## 2022-09-07 ENCOUNTER — PATIENT MESSAGE (OUTPATIENT)
Dept: PERINATAL CARE | Facility: CLINIC | Age: 27
End: 2022-09-07

## 2022-09-07 ENCOUNTER — TELEPHONE (OUTPATIENT)
Dept: PERINATAL CARE | Facility: CLINIC | Age: 27
End: 2022-09-07

## 2022-09-07 ENCOUNTER — DOCUMENTATION (OUTPATIENT)
Dept: PERINATAL CARE | Facility: CLINIC | Age: 27
End: 2022-09-07

## 2022-09-07 NOTE — PROGRESS NOTES
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  Avoid fasting for > 10 hours overnight  Continue SMBG 4 x per day (Fasting, 2 hour after start of each meal) with a One-Touch Verio  Many missing after meal BG results due to her job  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 Maternal Fetal Medicine  Diabetes in Pregnancy Program  [address]  [address]

## 2022-09-21 ENCOUNTER — OFFICE VISIT (OUTPATIENT)
Dept: PERINATAL CARE | Facility: CLINIC | Age: 27
End: 2022-09-21
Payer: COMMERCIAL

## 2022-09-21 VITALS
SYSTOLIC BLOOD PRESSURE: 117 MMHG | WEIGHT: 293 LBS | HEART RATE: 97 BPM | BODY MASS INDEX: 47.09 KG/M2 | DIASTOLIC BLOOD PRESSURE: 85 MMHG | HEIGHT: 66 IN

## 2022-09-21 DIAGNOSIS — O99.213 OBESITY COMPLICATING PREGNANCY, THIRD TRIMESTER: ICD-10-CM

## 2022-09-21 DIAGNOSIS — O24.410 DIET CONTROLLED GESTATIONAL DIABETES MELLITUS (GDM) IN THIRD TRIMESTER: Primary | ICD-10-CM

## 2022-09-21 DIAGNOSIS — Z3A.28 28 WEEKS GESTATION OF PREGNANCY: ICD-10-CM

## 2022-09-21 PROCEDURE — G0108 DIAB MANAGE TRN  PER INDIV: HCPCS | Performed by: DIETITIAN, REGISTERED

## 2022-09-21 NOTE — PATIENT INSTRUCTIONS
Pregnancy at 32 to 30 Weeks   AMBULATORY CARE:   What changes are happening to your body: You may notice new symptoms such as shortness of breath, heartburn, or swelling of your ankles and feet  You may also have trouble sleeping or contractions  Seek care immediately if:   You develop a severe headache that does not go away  You have new or increased vision changes, such as blurred or spotted vision  You have new or increased swelling in your face or hands  You have vaginal spotting or bleeding  Your water broke or you feel warm water gushing or trickling from your vagina  Call your doctor or obstetrician if:   You have more than 5 contractions in 1 hour  You notice any changes in your baby's movements  You have abdominal cramps, pressure, or tightening  You have a change in vaginal discharge  You have chills or a fever  You have vaginal itching, burning, or pain  You have yellow, green, white, or foul-smelling vaginal discharge  You have pain or burning when you urinate, less urine than usual, or pink or bloody urine  You have questions or concerns about your condition or care  How to care for yourself at this stage of your pregnancy:       Eat a variety of healthy foods  Healthy foods include fruits, vegetables, whole-grain breads, low-fat dairy foods, beans, lean meats, and fish  Drink liquids as directed  Ask how much liquid to drink each day and which liquids are best for you  Limit caffeine to less than 200 milligrams each day  Limit your intake of fish to 2 servings each week  Choose fish low in mercury such as canned light tuna, shrimp, salmon, cod, or tilapia  Do not  eat fish high in mercury such as swordfish, tilefish, flavio mackerel, and shark  Manage heartburn  by eating 4 or 5 small meals each day instead of large meals  Avoid spicy food  Manage swelling  by lying down and putting your feet up  Take prenatal vitamins as directed  Your need for certain vitamins and minerals, such as folic acid, increases during pregnancy  Prenatal vitamins provide some of the extra vitamins and minerals you need  Prenatal vitamins may also help to decrease the risk of certain birth defects  Talk to your healthcare provider about exercise  Moderate exercise can help you stay fit  Your healthcare provider will help you plan an exercise program that is safe for you during pregnancy  Do not smoke  Smoking increases your risk of a miscarriage and other health problems during your pregnancy  Smoking can cause your baby to be born too early or weigh less at birth  Ask your healthcare provider for information if you need help quitting  Do not drink alcohol  Alcohol passes from your body to your baby through the placenta  It can affect your baby's brain development and cause fetal alcohol syndrome (FAS)  FAS is a group of conditions that causes mental, behavior, and growth problems  Talk to your healthcare provider before you take any medicines  Many medicines may harm your baby if you take them when you are pregnant  Do not take any medicines, vitamins, herbs, or supplements without first talking to your healthcare provider  Never use illegal or street drugs (such as marijuana or cocaine) while you are pregnant  Safety tips during pregnancy:   Avoid hot tubs and saunas  Do not use a hot tub or sauna while you are pregnant, especially during your first trimester  Hot tubs and saunas may raise your baby's temperature and increase the risk of birth defects  Avoid toxoplasmosis  This is an infection caused by eating raw meat or being around infected cat feces  It can cause birth defects, miscarriages, and other problems  Wash your hands after you touch raw meat  Make sure any meat is well-cooked before you eat it  Avoid raw eggs and unpasteurized milk   Use gloves or ask someone else to clean your cat's litter box while you are pregnant  Changes that are happening with your baby:  By 30 weeks, your baby may weigh more than 3 pounds  Your baby may be about 11 inches long from the top of the head to the rump (baby's bottom)  Your baby's eyes open and close now  Your baby's kicks and movements are more forceful at this time  What you need to know about prenatal care: Your healthcare provider will check your blood pressure and weight  You may also need the following:  Blood tests  may be done to check for anemia or blood type  A urine test  may also be done to check for sugar and protein  These can be signs of gestational diabetes or infection  Protein in your urine may also be a sign of preeclampsia  Preeclampsia is a condition that can develop during week 20 or later of your pregnancy  It causes high blood pressure, and it can cause problems with your kidneys and other organs  A Tdap vaccine and flu vaccine  may be recommended by your healthcare provider  A gestational diabetes screen  may be done  Your healthcare provider may order either a 1-step or 2-step oral glucose tolerance test (OGTT)  1-step OGTT:  Your blood sugar level will be tested after you have not eaten for 8 hours (fasting)  You will then be given a glucose drink  Your level will be tested again 1 hour and 2 hours after you finish the drink  2-step OGTT:  You do not have to fast for the first part of the test  You will have the glucose drink at any time of day  Your blood sugar level will be checked 1 hour later  If your blood sugar is higher than a certain level, another test will be ordered  You will fast and your blood sugar level will be tested  You will have the glucose drink  Your blood will be tested again 1 hour, 2 hours, and 3 hours after you finish the glucose drink  Fundal height  is a measurement of your uterus to check your baby's growth  This number is usually the same as the number of weeks that you have been pregnant   Your healthcare provider may also check your baby's position  Your baby's heart rate  will be checked  Follow up with your doctor or obstetrician as directed:  Write down your questions so you remember to ask them during your visits  © Copyright [address] 2022 Information is for End User's use only and may not be sold, redistributed or otherwise used for commercial purposes  All illustrations and images included in CareNotes® are the copyrighted property of A D A M , Inc  or [facility] Neda Cruz   The above information is an  only  It is not intended as medical advice for individual conditions or treatments  Talk to your doctor, nurse or pharmacist before following any medical regimen to see if it is safe and effective for you

## 2022-09-21 NOTE — PROGRESS NOTES
Thank you for referring your patient to [facility] Maternal Fetal Medicine Diabetes in Pregnancy Program      Denise Cleveland is a  31 y o  female who presents today for Individual  DSMT Follow up  Patient is at 28w3d gestation, Estimated Date of Delivery: 12/11/22  Reviewed and updated the following from patients medical record: PMH, Problem List, Allergies, and Current Medications  Diagnosis:  Diet controlled GDM    Additional Pregnancy Complications:  Obesity    Blood Sugar Logs Submitted via: Glucose Flowsheet    Has difficulty testing her BG & eating her meals due to 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  Is not allowed to eat or test her BG  in front of the clients  Works 80+ hours weekly in her group home  Works every weekend from Friday 6 PM till can leave on a Monday morning  OK'd to test her BG at 1 or 2 hours after meals & to test 2-3 times daily--test every FBS & after 1-2 other meals if possible  Will be on vacation next week & will test 4 times daily  24 hr Diet Recall:  Provided at this appointemnt    Diet review: C/O no appetite with pregnancy now & is sensitive to scents of foods que hot foods  Now, sensitive to dairy foods also  Eats 3 small meals & a bedtime snack of a "Balanced Break" snack  Often eats dinner at 10 PM when arrives home from work or her clients are sleeping  Belongs to online GDM blogs & agreed to try some of the shake recipes for her between meal snacks  Is allowed to drink in front of her clients  Dislikes shake on the market due to the chalky flavor  Suggested patient eat mostly cold foods due to scent sensitivity  Assessment and Plan:  No diabetes related medications  2200 calorie (CHO:[id number]) (PRO: 3-2-3/4-2-3/4-2) meal plan consisting of 3 meals and 3 snacks daily including protein at each  Advised patient to Add homemade shakes to her meal plan for her betweeen meal snacks     Avoid fasting for > 10 hours overnight  Continue SMBG 4 x per day (Fasting, 2 hour after start of each meal) With a One-Touch Verio glucose meter  Will decrease to testing 2-3 times daily (includes FBS & 1-2 times after  meals & test BG more at 1 hour after some meals  Active job working in a group home taking care of her clients  Anthropometrics:  Ht Readings from Last 3 Encounters:   09/21/22 5' 6" (1 676 m)   08/25/22 5' 6" (1 676 m)   07/25/22 5' 6" (1 676 m)     Wt Readings from Last 3 Encounters:   09/21/22 (!) 138 kg (304 lb 12 8 oz)   08/26/22 (!) 139 kg (307 lb 6 4 oz)   08/25/22 (!) 140 kg (307 lb 12 2 oz)     Pre-gravid weight: 139 kg (307 lb)  Pre-gravid BMI: 49 57  Weight Change: 0 181 kg (6 4 oz)  Weight gain recommendations: BMI (> 30) 11-20 lbs  Comments: edwina may gain up to 13 more pounds for the remainder of her pregnancy       Lab Work:   Lab Results   Component Value Date    HGBA1C 4 8 06/11/2022      Ultrasound:  Date:7/25/22  Fetal Growth: Normal  AURA: Normal  Next US date: 9/30/22    *Maternal fetal testing per OB/MFM recommendations    Diabetes Self Management Support Plan outside of ongoing care: Spouse/Family   Patient Stated Goal: "I will plan my meals and snacks every day"   Goal Assessment: Not on track    Date to report next: Weekly  F/U Date: as Needed (has difficulty scheduling & keeping appointments with her job reconcilabilities)    Begin Time: 9:35 AM  End Time: 10:10 AM    Ana Cranker  Diabetes Educator  [facility] Maternal Fetal Medicine  Diabetes in Pregnancy Program  [address] [place] [address], [address] [place] [address],[address]  [address]

## 2022-09-22 ENCOUNTER — ROUTINE PRENATAL (OUTPATIENT)
Dept: OBGYN CLINIC | Facility: CLINIC | Age: 27
End: 2022-09-22
Payer: COMMERCIAL

## 2022-09-22 VITALS — BODY MASS INDEX: 49.23 KG/M2 | WEIGHT: 293 LBS | SYSTOLIC BLOOD PRESSURE: 138 MMHG | DIASTOLIC BLOOD PRESSURE: 84 MMHG

## 2022-09-22 DIAGNOSIS — O34.219 HISTORY OF CESAREAN DELIVERY, ANTEPARTUM: ICD-10-CM

## 2022-09-22 DIAGNOSIS — Z3A.28 28 WEEKS GESTATION OF PREGNANCY: Primary | ICD-10-CM

## 2022-09-22 DIAGNOSIS — Z34.93 PRENATAL CARE, THIRD TRIMESTER: ICD-10-CM

## 2022-09-22 DIAGNOSIS — Z23 NEED FOR DIPHTHERIA-TETANUS-PERTUSSIS (TDAP) VACCINE: ICD-10-CM

## 2022-09-22 PROCEDURE — 90471 IMMUNIZATION ADMIN: CPT | Performed by: STUDENT IN AN ORGANIZED HEALTH CARE EDUCATION/TRAINING PROGRAM

## 2022-09-22 PROCEDURE — PNV: Performed by: STUDENT IN AN ORGANIZED HEALTH CARE EDUCATION/TRAINING PROGRAM

## 2022-09-22 PROCEDURE — 90715 TDAP VACCINE 7 YRS/> IM: CPT | Performed by: STUDENT IN AN ORGANIZED HEALTH CARE EDUCATION/TRAINING PROGRAM

## 2022-09-22 NOTE — PROGRESS NOTES
Jonathan Edward is a 31 y o   28w4d  Reports ++FM, no LOF, VB, or contractions       Vitals:    22 0901   BP: 138/84   S>D  +FHTs    A/P:  Third tri labs ordered  Rh status POS    TDAP vaccine--will get today    Contraception: wants one more, does not want tubal or birth control (had nexplanon)  Breastfeeding: yes, has pump  Has pediatrician already selected  Birth plan: repeat CS    Discussed pre-term labor precautions  Return to office in 2 weeks

## 2022-09-23 ENCOUNTER — APPOINTMENT (OUTPATIENT)
Dept: LAB | Facility: HOSPITAL | Age: 27
End: 2022-09-23
Payer: COMMERCIAL

## 2022-09-23 DIAGNOSIS — Z34.93 PRENATAL CARE IN THIRD TRIMESTER: ICD-10-CM

## 2022-09-23 LAB
ALBUMIN SERPL BCP-MCNC: 2.5 G/DL (ref 3.5–5)
ALP SERPL-CCNC: 149 U/L (ref 46–116)
ALT SERPL W P-5'-P-CCNC: 60 U/L (ref 12–78)
ANION GAP SERPL CALCULATED.3IONS-SCNC: 7 MMOL/L (ref 4–13)
AST SERPL W P-5'-P-CCNC: 29 U/L (ref 5–45)
BILIRUB SERPL-MCNC: 0.35 MG/DL (ref 0.2–1)
BUN SERPL-MCNC: 8 MG/DL (ref 5–25)
CALCIUM ALBUM COR SERPL-MCNC: 9.9 MG/DL (ref 8.3–10.1)
CALCIUM SERPL-MCNC: 8.7 MG/DL (ref 8.3–10.1)
CHLORIDE SERPL-SCNC: 103 MMOL/L (ref 96–108)
CO2 SERPL-SCNC: 27 MMOL/L (ref 21–32)
CREAT SERPL-MCNC: 0.52 MG/DL (ref 0.6–1.3)
ERYTHROCYTE [DISTWIDTH] IN BLOOD BY AUTOMATED COUNT: 13.6 % (ref 11.6–15.1)
GFR SERPL CREATININE-BSD FRML MDRD: 131 ML/MIN/1.73SQ M
GLUCOSE P FAST SERPL-MCNC: 85 MG/DL (ref 65–99)
HCT VFR BLD AUTO: 38.6 % (ref 34.8–46.1)
HGB BLD-MCNC: 12.4 G/DL (ref 11.5–15.4)
MCH RBC QN AUTO: 27.2 PG (ref 26.8–34.3)
MCHC RBC AUTO-ENTMCNC: 32.1 G/DL (ref 31.4–37.4)
MCV RBC AUTO: 85 FL (ref 82–98)
PLATELET # BLD AUTO: 290 THOUSANDS/UL (ref 149–390)
PMV BLD AUTO: 10.9 FL (ref 8.9–12.7)
POTASSIUM SERPL-SCNC: 4.4 MMOL/L (ref 3.5–5.3)
PROT SERPL-MCNC: 7.2 G/DL (ref 6.4–8.4)
RBC # BLD AUTO: 4.56 MILLION/UL (ref 3.81–5.12)
SODIUM SERPL-SCNC: 137 MMOL/L (ref 135–147)
WBC # BLD AUTO: 11.15 THOUSAND/UL (ref 4.31–10.16)

## 2022-09-23 PROCEDURE — 80053 COMPREHEN METABOLIC PANEL: CPT

## 2022-09-23 PROCEDURE — 86592 SYPHILIS TEST NON-TREP QUAL: CPT

## 2022-09-23 PROCEDURE — 85027 COMPLETE CBC AUTOMATED: CPT

## 2022-09-25 LAB — RPR SER QL: NORMAL

## 2022-09-30 ENCOUNTER — ULTRASOUND (OUTPATIENT)
Dept: PERINATAL CARE | Facility: OTHER | Age: 27
End: 2022-09-30
Payer: COMMERCIAL

## 2022-09-30 VITALS
SYSTOLIC BLOOD PRESSURE: 130 MMHG | HEART RATE: 104 BPM | DIASTOLIC BLOOD PRESSURE: 74 MMHG | BODY MASS INDEX: 49.71 KG/M2 | WEIGHT: 293 LBS

## 2022-09-30 DIAGNOSIS — O99.213 MATERNAL MORBID OBESITY IN THIRD TRIMESTER, ANTEPARTUM (HCC): ICD-10-CM

## 2022-09-30 DIAGNOSIS — Z3A.29 29 WEEKS GESTATION OF PREGNANCY: ICD-10-CM

## 2022-09-30 DIAGNOSIS — E66.01 MATERNAL MORBID OBESITY IN THIRD TRIMESTER, ANTEPARTUM (HCC): ICD-10-CM

## 2022-09-30 DIAGNOSIS — O24.410 DIET CONTROLLED GESTATIONAL DIABETES MELLITUS (GDM) IN THIRD TRIMESTER: Primary | ICD-10-CM

## 2022-09-30 PROCEDURE — 76816 OB US FOLLOW-UP PER FETUS: CPT | Performed by: OBSTETRICS & GYNECOLOGY

## 2022-09-30 PROCEDURE — 99213 OFFICE O/P EST LOW 20 MIN: CPT | Performed by: OBSTETRICS & GYNECOLOGY

## 2022-09-30 NOTE — PROGRESS NOTES
The patient was seen today for an ultrasound  Please see ultrasound report (located under Ob Procedures) for additional details  Thank you very much for allowing us to participate in the care of this very nice patient  Should you have any questions, please do not hesitate to contact me  Milan Pressley MD [id number]  Attending Physician, Lance

## 2022-10-04 NOTE — PROGRESS NOTES
OB/GYN  PN Visit  Albina Pedraza  [id number]  10/7/2022  12:37 PM  Dr Kowalski Stands    S: 31 y o  Alma Miles 30w5d here for PN visit  She denies contractions  She denies leakage of fluid and vaginal bleeding  She reports good fetal movement  She denies nausea, vomiting, headache, cramping, edema, domestic violence, and smoking  Her pregnancy is complicated by hx C/S and A1GDM  O:  Vitals:    10/07/22 0902   BP: 118/80     Physical Exam  Vitals reviewed  Constitutional:       General: She is not in acute distress  Appearance: Normal appearance  She is well-developed  She is not ill-appearing, toxic-appearing or diaphoretic  Cardiovascular:      Rate and Rhythm: Normal rate  Pulmonary:      Effort: Pulmonary effort is normal    Abdominal:      General: There is no distension  Palpations: Abdomen is soft  There is no mass  Tenderness: There is no abdominal tenderness  There is no guarding or rebound  Genitourinary:     Comments: Gravid, nontender  Skin:     General: Skin is warm and dry  Neurological:      Mental Status: She is alert and oriented to person, place, and time     Psychiatric:         Mood and Affect: Mood normal          Behavior: Behavior normal        Fundal height: 33cm, difficult to assess secondary to patient's body habitus  FHT: 140bpm     A/P:      Problem List        Unprioritized    Maternal morbid obesity in third trimester, antepartum ([facility] [address] )    Current Assessment & Plan     weekly APFS at 34 wks due to BMI         Diet controlled gestational diabetes mellitus (GDM) in third trimester    Overview     Checking 1 hour post prandials         30 weeks gestation of pregnancy    Current Assessment & Plan     - Continue PNV  - Labor precautions reviewed  - Fetal kick counts reviewed  - Delivery consent signed  - Labs: Reviewed & UTD  - Ultrasounds: Most recent [address] on 9/30/22 wnl; Next US scheduled for 11/4/22  - Tdap: Administered 9/22/22  - COVID: Fully vaccinated  - Delivery: RLTCS; Scheduled for 22 @ 10am  - Contraception: Prior Nexplanon; Does NOT desire tubal  - RTO in 2 weeks           History of  delivery, antepartum    Overview     Plans repeat CS         Prenatal care in third trimester            Future Appointments   Date Time Provider Vaishali Crane   10/21/2022  8:45 AM Jamila Owen MD [facility]   2022 11:15 AM  US [id number]   2022  9:15 AM Ana Maria Enrique MD [facility]   2022 10:00 AM Jamila Owen MD [facility]   2022  1:30 PM Ana Maria Enrique MD [facility]  10/7/2022  12:37 PM

## 2022-10-07 ENCOUNTER — ROUTINE PRENATAL (OUTPATIENT)
Dept: OBGYN CLINIC | Facility: CLINIC | Age: 27
End: 2022-10-07
Payer: COMMERCIAL

## 2022-10-07 VITALS — DIASTOLIC BLOOD PRESSURE: 80 MMHG | BODY MASS INDEX: 49.2 KG/M2 | SYSTOLIC BLOOD PRESSURE: 118 MMHG | WEIGHT: 293 LBS

## 2022-10-07 DIAGNOSIS — E66.01 MATERNAL MORBID OBESITY IN THIRD TRIMESTER, ANTEPARTUM (HCC): ICD-10-CM

## 2022-10-07 DIAGNOSIS — O99.213 MATERNAL MORBID OBESITY IN THIRD TRIMESTER, ANTEPARTUM (HCC): ICD-10-CM

## 2022-10-07 DIAGNOSIS — Z34.93 PRENATAL CARE IN THIRD TRIMESTER: ICD-10-CM

## 2022-10-07 DIAGNOSIS — Z3A.30 30 WEEKS GESTATION OF PREGNANCY: Primary | ICD-10-CM

## 2022-10-07 DIAGNOSIS — O24.410 DIET CONTROLLED GESTATIONAL DIABETES MELLITUS (GDM) IN THIRD TRIMESTER: ICD-10-CM

## 2022-10-07 PROCEDURE — 99213 OFFICE O/P EST LOW 20 MIN: CPT | Performed by: OBSTETRICS & GYNECOLOGY

## 2022-10-07 RX ORDER — ASPIRIN 81 MG/1
162 TABLET ORAL DAILY
Qty: 60 TABLET | Refills: 2 | Status: SHIPPED | OUTPATIENT
Start: 2022-10-07

## 2022-10-07 NOTE — ASSESSMENT & PLAN NOTE
- Continue PNV  - Labor precautions reviewed  - Fetal kick counts reviewed  - Delivery consent signed  - Labs: Reviewed & UTD  - Ultrasounds: Most recent [address] on 9/30/22 wnl; Next US scheduled for 11/4/22  - Tdap: Administered 9/22/22  - COVID: Fully vaccinated  - Delivery: RLTCS; Scheduled for 12/5/22 @ 10am  - Contraception: Prior Nexplanon; Does NOT desire tubal  - RTO in 2 weeks

## 2022-10-21 ENCOUNTER — ROUTINE PRENATAL (OUTPATIENT)
Dept: OBGYN CLINIC | Facility: CLINIC | Age: 27
End: 2022-10-21
Payer: COMMERCIAL

## 2022-10-21 VITALS — BODY MASS INDEX: 49.71 KG/M2 | DIASTOLIC BLOOD PRESSURE: 80 MMHG | WEIGHT: 293 LBS | SYSTOLIC BLOOD PRESSURE: 114 MMHG

## 2022-10-21 DIAGNOSIS — Z23 FLU VACCINE NEED: ICD-10-CM

## 2022-10-21 DIAGNOSIS — Z3A.32 32 WEEKS GESTATION OF PREGNANCY: ICD-10-CM

## 2022-10-21 DIAGNOSIS — Z34.93 PRENATAL CARE IN THIRD TRIMESTER: Primary | ICD-10-CM

## 2022-10-21 PROCEDURE — 99215 OFFICE O/P EST HI 40 MIN: CPT | Performed by: STUDENT IN AN ORGANIZED HEALTH CARE EDUCATION/TRAINING PROGRAM

## 2022-10-21 PROCEDURE — 90686 IIV4 VACC NO PRSV 0.5 ML IM: CPT | Performed by: STUDENT IN AN ORGANIZED HEALTH CARE EDUCATION/TRAINING PROGRAM

## 2022-10-21 PROCEDURE — 90471 IMMUNIZATION ADMIN: CPT | Performed by: STUDENT IN AN ORGANIZED HEALTH CARE EDUCATION/TRAINING PROGRAM

## 2022-10-21 NOTE — PATIENT INSTRUCTIONS
Pregnancy at 31 to 34 Weeks   AMBULATORY CARE:   Changes happening with your body: You may continue to have symptoms such as shortness of breath, heartburn, contractions, or swelling of your ankles and feet  You may be gaining about 1 pound a week now  Seek care immediately if:   You develop a severe headache that does not go away  You have new or increased vision changes, such as blurred or spotted vision  You have new or increased swelling in your face or hands  You have vaginal spotting or bleeding  Your water broke or you feel warm water gushing or trickling from your vagina  Call your obstetrician if:   You have more than 5 contractions in 1 hour  You notice any changes in your baby's movements  You have abdominal cramps, pressure, or tightening  You have a change in vaginal discharge  You have chills or a fever  You have vaginal itching, burning, or pain  You have yellow, green, white, or foul-smelling vaginal discharge  You have pain or burning when you urinate, less urine than usual, or pink or bloody urine  You have questions or concerns about your condition or care  How to care for yourself at this stage of your pregnancy:       Eat a variety of healthy foods  Healthy foods include fruits, vegetables, whole-grain breads, low-fat dairy foods, beans, lean meats, and fish  Drink liquids as directed  Ask how much liquid to drink each day and which liquids are best for you  Limit caffeine to less than 200 milligrams each day  Limit your intake of fish to 2 servings each week  Choose fish low in mercury such as canned light tuna, shrimp, salmon, cod, or tilapia  Do not  eat fish high in mercury such as swordfish, tilefish, flavio mackerel, and shark  Manage heartburn  by eating 4 or 5 small meals each day instead of large meals  Avoid spicy food  Manage swelling  by lying down and putting your feet up  Take prenatal vitamins as directed    Your need for certain vitamins and minerals, such as folic acid, increases during pregnancy  Prenatal vitamins provide some of the extra vitamins and minerals you need  Prenatal vitamins may also help to decrease the risk of certain birth defects  Talk to your healthcare provider about exercise  Moderate exercise can help you stay fit  Your healthcare provider will help you plan an exercise program that is safe for you during pregnancy  Do not smoke  Smoking increases your risk of a miscarriage and other health problems during your pregnancy  Smoking can cause your baby to be born too early or weigh less at birth  Ask your healthcare provider for information if you need help quitting  Do not drink alcohol  Alcohol passes from your body to your baby through the placenta  It can affect your baby's brain development and cause fetal alcohol syndrome (FAS)  FAS is a group of conditions that causes mental, behavior, and growth problems  Talk to your healthcare provider before you take any medicines  Many medicines may harm your baby if you take them when you are pregnant  Do not take any medicines, vitamins, herbs, or supplements without first talking to your healthcare provider  Never use illegal or street drugs (such as marijuana or cocaine) while you are pregnant  Safety tips during pregnancy:   Avoid hot tubs and saunas  Do not use a hot tub or sauna while you are pregnant, especially during your first trimester  Hot tubs and saunas may raise your baby's temperature and increase the risk of birth defects  Avoid toxoplasmosis  This is an infection caused by eating raw meat or being around infected cat feces  It can cause birth defects, miscarriages, and other problems  Wash your hands after you touch raw meat  Make sure any meat is well-cooked before you eat it  Avoid raw eggs and unpasteurized milk  Use gloves or ask someone else to clean your cat's litter box while you are pregnant  Changes happening with your baby:  By 34 weeks, your baby may weigh more than 5 pounds  Your baby will be about 12 ½ inches long from the top of the head to the rump (baby's bottom)  Your baby is gaining about ½ pound a week  Your baby's eyes open and close now  Your baby's kicks and movements are more forceful at this time  What you need to know about prenatal care: Your healthcare provider will check your blood pressure and weight  You may also need the following:  A urine test  may also be done to check for sugar and protein  These can be signs of gestational diabetes or infection  Protein in your urine may also be a sign of preeclampsia  Preeclampsia is a condition that can develop during week 20 or later of your pregnancy  It causes high blood pressure, and it can cause problems with your kidneys and other organs  A gestational diabetes screen  may be done  Your healthcare provider may order either a 1-step or 2-step oral glucose tolerance test (OGTT)  1-step OGTT:  Your blood sugar level will be tested after you have not eaten for 8 hours (fasting)  You will then be given a glucose drink  Your level will be tested again 1 hour and 2 hours after you finish the drink  2-step OGTT:  You do not have to fast for the first part of the test  You will have the glucose drink at any time of day  Your blood sugar level will be checked 1 hour later  If your blood sugar is higher than a certain level, another test will be ordered  You will fast and your blood sugar level will be tested  You will have the glucose drink  Your blood will be tested again 1 hour, 2 hours, and 3 hours after you finish the glucose drink  A Tdap vaccine  may be recommended by your healthcare provider  Fundal height  is a measurement of your uterus to check your baby's growth  This number is usually the same as the number of weeks that you have been pregnant   Your healthcare provider may also check your baby's position  Your baby's heart rate  will be checked  Follow up with your obstetrician as directed:  Write down your questions so you remember to ask them during your visits  © Copyright [employer] 2022 Information is for End User's use only and may not be sold, redistributed or otherwise used for commercial purposes  All illustrations and images included in CareNotes® are the copyrighted property of A D A M , Inc  or Jaya Cruz   The above information is an  only  It is not intended as medical advice for individual conditions or treatments  Talk to your doctor, nurse or pharmacist before following any medical regimen to see if it is safe and effective for you  Kick Counts in Pregnancy   WHAT YOU NEED TO KNOW:   Kick counts measure how much your baby is moving in your womb  A kick from your baby can be felt as a twist, turn, swish, roll, or jab  It is common to feel your baby kicking at 26 to 28 weeks of pregnancy  You may feel your baby kick as early as 20 weeks of pregnancy  You may want to start counting at 28 weeks  DISCHARGE INSTRUCTIONS:   Contact your doctor immediately if:   You feel a change in the number of kicks or movements of your baby  You feel fewer than 10 kicks within 2 hours  You have questions or concerns about your baby's movements  Why measure kick counts:  Your baby's movement may provide information about your baby's health  He or she may move less, or not at all, if there are problems  Your baby may move less if he or she is not getting enough oxygen or nutrition from the placenta  Do not smoke while you are pregnant  Smoking decreases the amount of oxygen that gets to your baby  Talk to your healthcare provider if you need help to quit smoking  Tell your healthcare provider as soon as you feel a change in your baby's movements  When to measure kick counts:   Measure kick counts at the same time every day        Measure kick counts when your baby is awake and most active  Your baby may be most active in the evening  How to measure kick counts:  Check that your baby is awake before you measure kick counts  You can wake up your baby by lightly pushing on your belly, walking, or drinking something cold  Your healthcare provider may tell you different ways to measure kick counts  You may be told to do the following:  Use a chart or clock to keep track of the time you start and finish counting  Sit in a chair or lie on your left side  Place your hands on the largest part of your belly  Count until you reach 10 kicks  Write down how much time it takes to count 10 kicks  It may take 30 minutes to 2 hours to count 10 kicks  It should not take more than 2 hours to count 10 kicks  Follow up with your doctor as directed:  Write down your questions so you remember to ask them during your visits  © Copyright [employer] 2022 Information is for End User's use only and may not be sold, redistributed or otherwise used for commercial purposes  All illustrations and images included in CareNotes® are the copyrighted property of [employer] , Inc  or [facility] Neda Cruz   The above information is an  only  It is not intended as medical advice for individual conditions or treatments  Talk to your doctor, nurse or pharmacist before following any medical regimen to see if it is safe and effective for you  Yes

## 2022-10-21 NOTE — PROGRESS NOTES
Glenn Grant is a  at [address] [facility] presenting for routine PNC- denies CTX, LOF or VB  Endorses good FM  Urine trace protein, neg glucose  GDM- following with diabetic educators  Has RLTCS scheduled- plan to start ANFS at 34 weeks  Flu vaccination today  RTO in 2 weeks or sooner if needed

## 2022-10-31 ENCOUNTER — ULTRASOUND (OUTPATIENT)
Dept: PERINATAL CARE | Facility: OTHER | Age: 27
End: 2022-10-31

## 2022-10-31 VITALS
SYSTOLIC BLOOD PRESSURE: 124 MMHG | DIASTOLIC BLOOD PRESSURE: 80 MMHG | WEIGHT: 293 LBS | HEART RATE: 92 BPM | HEIGHT: 66 IN | BODY MASS INDEX: 47.09 KG/M2

## 2022-10-31 DIAGNOSIS — O99.213 MATERNAL MORBID OBESITY IN THIRD TRIMESTER, ANTEPARTUM (HCC): ICD-10-CM

## 2022-10-31 DIAGNOSIS — Z3A.34 34 WEEKS GESTATION OF PREGNANCY: ICD-10-CM

## 2022-10-31 DIAGNOSIS — O24.410 DIET CONTROLLED GESTATIONAL DIABETES MELLITUS (GDM) IN THIRD TRIMESTER: Primary | ICD-10-CM

## 2022-10-31 DIAGNOSIS — E66.01 MATERNAL MORBID OBESITY IN THIRD TRIMESTER, ANTEPARTUM (HCC): ICD-10-CM

## 2022-10-31 NOTE — LETTER
Baby: Male   /   Female   /   [place]                     Baby Name: ___________________            IOL: _____________________              C/S: _____________________    NST sleeve cover sheet    Patient name: Victor Hugo Coronado  : 1995  MRN: [id number]    THEO: Estimated Date of Delivery: 22    Obstetrician: [facility]    Reason(s) for testing: Pre-gravid BMI 49 57    Testing frequency:    ___  2x/wk  _X_  1x/wk  ___  Dopplers  ___  BPP?       Last growth scan: ___________

## 2022-10-31 NOTE — LETTER
October 31, 2022     Jeffery Kwan MD  [facility] [address]    Patient: Everton Cronin   YOB: 1995   Date of Visit: 10/31/2022       Dear Dr No Levy:    Thank you for referring Everton Cronin to me for evaluation  Below are my notes for this consultation  If you have questions, please do not hesitate to call me  I look forward to following your patient along with you  Sincerely,        Brodie De MD        CC: No Recipients  Brodie De MD  10/30/2022  6:32 PM  Sign when Signing Visit  Please refer to the [facility] ultrasound report in Ob Procedures for additional information regarding today's visit

## 2022-10-31 NOTE — PROGRESS NOTES
Non-Stress Testing:  Non-Stress test, equipment, procedure, and expected outcomes reviewed  Reviewed fetal kick counts and when to call OB  Esther Morgan Verified patient understanding of fetal kick counts with teach back method  1st  Non-Stress Testing:  Patient verbalizes +FM  Pt denies ALL:               Leaking of fluid   Contractions   Vaginal bleeding   Decreased fetal movement    Patient is performing daily kick counts  Patient has no questions or concerns  NST strip reviewed by Dr Giovanni Garcia

## 2022-10-31 NOTE — PATIENT INSTRUCTIONS
Kick Counts in Pregnancy   WHAT YOU NEED TO KNOW:   Kick counts measure how much your baby is moving in your womb  A kick from your baby can be felt as a twist, turn, swish, roll, or jab  It is common to feel your baby kicking at 26 to 28 weeks of pregnancy  You may feel your baby kick as early as 20 weeks of pregnancy  You may want to start counting at 28 weeks  DISCHARGE INSTRUCTIONS:   Contact your doctor immediately if:   You feel a change in the number of kicks or movements of your baby  You feel fewer than 10 kicks within 2 hours  You have questions or concerns about your baby's movements  Why measure kick counts:  Your baby's movement may provide information about your baby's health  He or she may move less, or not at all, if there are problems  Your baby may move less if he or she is not getting enough oxygen or nutrition from the placenta  Do not smoke while you are pregnant  Smoking decreases the amount of oxygen that gets to your baby  Talk to your healthcare provider if you need help to quit smoking  Tell your healthcare provider as soon as you feel a change in your baby's movements  When to measure kick counts:   Measure kick counts at the same time every day  Measure kick counts when your baby is awake and most active  Your baby may be most active in the evening  How to measure kick counts:  Check that your baby is awake before you measure kick counts  You can wake up your baby by lightly pushing on your belly, walking, or drinking something cold  Your healthcare provider may tell you different ways to measure kick counts  You may be told to do the following:  Use a chart or clock to keep track of the time you start and finish counting  Sit in a chair or lie on your left side  Place your hands on the largest part of your belly  Count until you reach 10 kicks  Write down how much time it takes to count 10 kicks  It may take 30 minutes to 2 hours to count 10 kicks  It should not take more than 2 hours to count 10 kicks  Follow up with your doctor as directed:  Write down your questions so you remember to ask them during your visits  © Copyright [employer] 2022 Information is for End User's use only and may not be sold, redistributed or otherwise used for commercial purposes  All illustrations and images included in CareNotes® are the copyrighted property of A D A M , Inc  or Jaya Cruz   The above information is an  only  It is not intended as medical advice for individual conditions or treatments  Talk to your doctor, nurse or pharmacist before following any medical regimen to see if it is safe and effective for you  Nonstress Test for Pregnancy   WHAT YOU NEED TO KNOW:   What do I need to know about a nonstress test?  A nonstress test measures your baby's heart rate and movements  Nonstress means that no stress will be placed on your baby during the test   How do I prepare for a nonstress test?  Your healthcare provider will talk to you about how to prepare for this test  He or she may tell you to eat and drink plenty of fluids before your test  If you smoke, you may be asked not to smoke within 2 hours before the test  He or she will also tell you which medicines to take or not take on the day of your test   What will happen during a nonstress test?  You may be asked to lie down or recline back for the test on a bed  One or 2 belts with sensors will be placed around your abdomen  Your baby's heart rate will be recorded with a machine  If your baby does not move, your baby may be asleep  Your healthcare provider may make a noise near your abdomen to try to wake your baby  The test usually takes about 20 minutes, but can take longer if your baby needs to be awakened  What do I need to know about the test results? Your baby will be expected to move at least 2 times for a certain amount of time   Your baby's heart rate will be expected to go up by a certain number of beats per minute during movement  If your baby does not move as expected, the test may need to be repeated or you may need other tests  CARE AGREEMENT:   You have the right to help plan your care  Learn about your health condition and how it may be treated  Discuss treatment options with your healthcare providers to decide what care you want to receive  You always have the right to refuse treatment  The above information is an  only  It is not intended as medical advice for individual conditions or treatments  Talk to your doctor, nurse or pharmacist before following any medical regimen to see if it is safe and effective for you  © Copyright [employer] 2022 Information is for End User's use only and may not be sold, redistributed or otherwise used for commercial purposes   All illustrations and images included in CareNotes® are the copyrighted property of A D A M , Inc  or [address] [place] [address]

## 2022-11-07 ENCOUNTER — ULTRASOUND (OUTPATIENT)
Dept: PERINATAL CARE | Facility: OTHER | Age: 27
End: 2022-11-07

## 2022-11-07 VITALS
SYSTOLIC BLOOD PRESSURE: 128 MMHG | HEART RATE: 86 BPM | BODY MASS INDEX: 47.09 KG/M2 | WEIGHT: 293 LBS | HEIGHT: 66 IN | DIASTOLIC BLOOD PRESSURE: 62 MMHG

## 2022-11-07 DIAGNOSIS — E66.01 MATERNAL MORBID OBESITY IN THIRD TRIMESTER, ANTEPARTUM (HCC): ICD-10-CM

## 2022-11-07 DIAGNOSIS — Z3A.35 35 WEEKS GESTATION OF PREGNANCY: Primary | ICD-10-CM

## 2022-11-07 DIAGNOSIS — O99.213 MATERNAL MORBID OBESITY IN THIRD TRIMESTER, ANTEPARTUM (HCC): ICD-10-CM

## 2022-11-07 NOTE — LETTER
November 7, 2022     Gregory Blackman, [address] [address]    Patient: Esteban Cousins   YOB: 1995   Date of Visit: 11/7/2022       Dear Dr May Samson:    Thank you for referring Esteban Cousins to me for evaluation  Below are my notes for this consultation  If you have questions, please do not hesitate to call me  I look forward to following your patient along with you  Sincerely,        Samantha Khanna MD        CC: No Recipients  Samantha Khanna MD  11/6/2022  9:45 AM  Sign when Signing Visit  Please refer to the [facility] ultrasound report in Ob Procedures for additional information regarding today's visit

## 2022-11-07 NOTE — PROGRESS NOTES
NST was done today  Pt  Reported active fetal movement at home between visit  Daily fetal kick count reviewed and emphasized  Patient verbalized understanding of all and was receptive      Lizzie File

## 2022-11-07 NOTE — PATIENT INSTRUCTIONS
Kick Counts in Pregnancy   WHAT YOU NEED TO KNOW:   What do I need to know about kick counts? Kick counts measure how much your baby is moving in your womb  A kick from your baby can be felt as a twist, turn, swish, roll, or jab  It is common to feel your baby kicking at 26 to 28 weeks of pregnancy  You may feel your baby kick as early as 20 weeks of pregnancy  You may want to start counting at 28 weeks  Why should I measure kick counts? Your baby's movement may provide information about your baby's health  He or she may move less, or not at all, if there are problems  Your baby may move less if he or she is not getting enough oxygen or nutrition from the placenta  Do not smoke while you are pregnant  Smoking decreases the amount of oxygen that gets to your baby  Talk to your healthcare provider if you need help to quit smoking  Tell your healthcare provider as soon as you feel a change in your baby's movements  When do I measure kick counts? Measure kick counts at the same time every day  Measure kick counts when your baby is awake and most active  Your baby may be most active in the evening  How do I measure kick counts? Check that your baby is awake before you measure kick counts  You can wake up your baby by lightly pushing on your belly, walking, or drinking something cold  Your healthcare provider may tell you different ways to measure kick counts  You may be told to do the following:  Use a chart or clock to keep track of the time you start and finish counting  Sit in a chair or lie on your left side  Place your hands on the largest part of your belly  Count until you reach 10 kicks  Write down how much time it takes to count 10 kicks  It may take 30 minutes to 2 hours to count 10 kicks  It should not take more than 2 hours to count 10 kicks  When should I contact my doctor? You feel a change in the number of kicks or movements of your baby        You feel fewer than 10 kicks within 2 hours  You have questions or concerns about your baby's movements  CARE AGREEMENT:   You have the right to help plan your care  Learn about your health condition and how it may be treated  Discuss treatment options with your healthcare providers to decide what care you want to receive  You always have the right to refuse treatment  The above information is an  only  It is not intended as medical advice for individual conditions or treatments  Talk to your doctor, nurse or pharmacist before following any medical regimen to see if it is safe and effective for you  © Copyright [employer] 2022 Information is for End User's use only and may not be sold, redistributed or otherwise used for commercial purposes   All illustrations and images included in CareNotes® are the copyrighted property of A D A M , Inc  or [address] [place] [address]

## 2022-11-07 NOTE — LETTER
NST sleeve cover sheet    Patient name: Dylan Head  : 1995  MRN: [id number]    THEO: Estimated Date of Delivery: 22    Obstetrician: ___________CFW__________    Reason(s) for testing:  ____________________GDM______________      Testing frequency:    ___ 2x/wk  _x_ 1x/wk  ___ Dopplers  ___ BPP?       Last growth scan: __________________________________________

## 2022-11-11 ENCOUNTER — ROUTINE PRENATAL (OUTPATIENT)
Dept: OBGYN CLINIC | Facility: CLINIC | Age: 27
End: 2022-11-11

## 2022-11-11 VITALS — SYSTOLIC BLOOD PRESSURE: 120 MMHG | DIASTOLIC BLOOD PRESSURE: 80 MMHG | WEIGHT: 293 LBS | BODY MASS INDEX: 49.52 KG/M2

## 2022-11-11 DIAGNOSIS — Z3A.35 35 WEEKS GESTATION OF PREGNANCY: Primary | ICD-10-CM

## 2022-11-11 DIAGNOSIS — Z34.93 PRENATAL CARE IN THIRD TRIMESTER: ICD-10-CM

## 2022-11-11 DIAGNOSIS — O34.219 HISTORY OF CESAREAN DELIVERY, ANTEPARTUM: ICD-10-CM

## 2022-11-11 NOTE — PATIENT INSTRUCTIONS
Pregnancy at 28 to 38 Weeks   AMBULATORY CARE:   Changes happening with your body: You are considered full term at the beginning of 37 weeks  Your breathing may be easier if your baby has moved down into a head-down position  You may need to urinate more often because the baby may be pressing on your bladder  You may also feel more discomfort and get tired easily  Seek care immediately if:   You develop a severe headache that does not go away  You have new or increased vision changes, such as blurred or spotted vision  You have new or increased swelling in your face or hands  You have vaginal spotting or bleeding  Your water broke or you feel warm water gushing or trickling from your vagina  Call your obstetrician if:   You have more than 5 contractions in 1 hour  You notice any changes in your baby's movements  You have abdominal cramps, pressure, or tightening  You have a change in vaginal discharge  You have chills or a fever  You have vaginal itching, burning, or pain  You have yellow, green, white, or foul-smelling vaginal discharge  You have pain or burning when you urinate, less urine than usual, or pink or bloody urine  You have questions or concerns about your condition or care  How to care for yourself at this stage of your pregnancy:       Eat a variety of healthy foods  Healthy foods include fruits, vegetables, whole-grain breads, low-fat dairy foods, beans, lean meats, and fish  Drink liquids as directed  Ask how much liquid to drink each day and which liquids are best for you  Limit caffeine to less than 200 milligrams each day  Limit your intake of fish to 2 servings each week  Choose fish low in mercury such as canned light tuna, shrimp, salmon, cod, or tilapia  Do not  eat fish high in mercury such as swordfish, tilefish, flavio mackerel, and shark  Take prenatal vitamins as directed    Your need for certain vitamins and minerals, such as folic acid, increases during pregnancy  Prenatal vitamins provide some of the extra vitamins and minerals you need  Prenatal vitamins may also help to decrease the risk of certain birth defects  Rest as needed  Put your feet up if you have swelling in your ankles and feet  Talk to your healthcare provider about exercise  Moderate exercise can help you stay fit  Your healthcare provider will help you plan an exercise program that is safe for you during pregnancy  Do not smoke  Smoking increases your risk of a miscarriage and other health problems during your pregnancy  Smoking can cause your baby to be born early or weigh less at birth  Ask your healthcare provider for information if you need help quitting  Do not drink alcohol  Alcohol passes from your body to your baby through the placenta  It can affect your baby's brain development and cause fetal alcohol syndrome (FAS)  FAS is a group of conditions that causes mental, behavior, and growth problems  Talk to your healthcare provider before you take any medicines  Many medicines may harm your baby if you take them when you are pregnant  Do not take any medicines, vitamins, herbs, or supplements without first talking to your healthcare provider  Never use illegal or street drugs (such as marijuana or cocaine) while you are pregnant  Safety tips during pregnancy:   Avoid hot tubs and saunas  Do not use a hot tub or sauna while you are pregnant, especially during your first trimester  Hot tubs and saunas may raise your baby's temperature and increase the risk of birth defects  Avoid toxoplasmosis  This is an infection caused by eating raw meat or being around infected cat feces  It can cause birth defects, miscarriages, and other problems  Wash your hands after you touch raw meat  Make sure any meat is well-cooked before you eat it  Avoid raw eggs and unpasteurized milk   Use gloves or ask someone else to clean your cat's litter box while you are pregnant  Ask your healthcare provider about travel  The most comfortable time to travel is during the second trimester  Ask your provider if you can travel after 36 weeks  You may not be able to travel in an airplane after 36 weeks  He or she may also recommend you avoid long road trips  Changes happening with your baby:  By 38 weeks, your baby may weigh between 6 and 9 pounds  Your baby may be about 14 inches long from the top of the head to the rump (baby's bottom)  Your baby hears well enough to know your voice  As your baby gets larger, you may feel fewer kicks and more stretching and rolling  Your baby may move into a head-down position  Your baby will also rest lower in your abdomen  What you need to know about prenatal care: Your healthcare provider will check your blood pressure and weight  You may also need the following:  A urine test  may also be done to check for sugar and protein  These can be signs of gestational diabetes or infection  Protein in your urine may also be a sign of preeclampsia  Preeclampsia is a condition that can develop during week 20 or later of your pregnancy  It causes high blood pressure, and it can cause problems with your kidneys and other organs  A gestational diabetes screen  may be done  Your healthcare provider may order either a 1-step or 2-step oral glucose tolerance test (OGTT)  1-step OGTT:  Your blood sugar level will be tested after you have not eaten for 8 hours (fasting)  You will then be given a glucose drink  Your level will be tested again 1 hour and 2 hours after you finish the drink  2-step OGTT:  You do not have to fast for the first part of the test  You will have the glucose drink at any time of day  Your blood sugar level will be checked 1 hour later  If your blood sugar is higher than a certain level, another test will be ordered  You will fast and your blood sugar level will be tested  You will have the glucose drink  Your blood will be tested again 1 hour, 2 hours, and 3 hours after you finish the glucose drink  A blood test  may be done to check for anemia (low iron level)  A Tdap vaccine  may be recommended by your healthcare provider  A group B strep test  is a test that is done to check for group B strep infection  Group B strep is a type of bacteria that may be found in the vagina or rectum  It can be passed to your baby during delivery if you have it  Your healthcare provider will take swab your vagina or rectum and send the sample to the lab for tests  Fundal height  is a measurement of your uterus to check your baby's growth  This number is usually the same as the number of weeks that you have been pregnant  Your healthcare provider may also check your baby's position  Your baby's heart rate  will be checked  Follow up with your obstetrician as directed:  Write down your questions so you remember to ask them during your visits  © Copyright [employer] 2022 Information is for End User's use only and may not be sold, redistributed or otherwise used for commercial purposes  All illustrations and images included in CareNotes® are the copyrighted property of [employer]  or [facility] Neda Cruz   The above information is an  only  It is not intended as medical advice for individual conditions or treatments  Talk to your doctor, nurse or pharmacist before following any medical regimen to see if it is safe and effective for you

## 2022-11-11 NOTE — PROGRESS NOTES
Harinder Collins is a 28 y o   35w5d  Reports ++FM, no LOF, VB, or contractions       Vitals:    22 0916   BP: 120/80   S=D  +FHTs    A/P:  GDM1- delivery 39-40 wks- thrid trimester growth  Normal fetal echo  weekly APFS at 34 wks due to BMI  repeat c/s  @ 10am, delivery consent in chart    TDAP vaccine--completed  Flu vaccine--completed    Contraception: not BS, no Nexplanon  Breastfeeding: plans, has pump, has appt with lactation consultant    Discussed pre-term labor precautions  Return to office in 1-2 weeks

## 2022-11-14 ENCOUNTER — ULTRASOUND (OUTPATIENT)
Dept: PERINATAL CARE | Facility: CLINIC | Age: 27
End: 2022-11-14

## 2022-11-14 VITALS
HEART RATE: 100 BPM | DIASTOLIC BLOOD PRESSURE: 76 MMHG | HEIGHT: 66 IN | WEIGHT: 293 LBS | BODY MASS INDEX: 47.09 KG/M2 | SYSTOLIC BLOOD PRESSURE: 124 MMHG

## 2022-11-14 DIAGNOSIS — O28.8 NON-REACTIVE NST (NON-STRESS TEST): ICD-10-CM

## 2022-11-14 DIAGNOSIS — O99.213 OBESITY AFFECTING PREGNANCY IN THIRD TRIMESTER: ICD-10-CM

## 2022-11-14 DIAGNOSIS — Z3A.36 36 WEEKS GESTATION OF PREGNANCY: Primary | ICD-10-CM

## 2022-11-14 NOTE — PROGRESS NOTES
[address]: Ms Coco Valverde was seen today at 36w1d for NST (found under the pregnancy episode) which I reviewed the RN assessment and agree  See ultrasound report under "OB Procedures" tab    Please don't hesitate to contact our office with any concerns or questions   -Jose Alfredo Huang MD

## 2022-11-14 NOTE — PROGRESS NOTES
Repeat Non-Stress Testing:    Patient verbalizes +FM  Pt denies ALL:               Leaking of fluid   Contractions   Vaginal bleeding   Decreased fetal movement    Patient is performing daily kick counts  Patient has no questions or concerns  NST strip reviewed by Dr Damian Bautista and ordered BPP for today's visit

## 2022-11-14 NOTE — LETTER
NST sleeve cover sheet    Patient name: Eun Mclean  : 1995  MRN: [id number]    THEO: Estimated Date of Delivery: 22    Obstetrician: _______________________________    Reason(s) for testing:  __________________________________________      Testing frequency:    ___ 2x/wk  ___ 1x/wk  ___ Dopplers  ___ BPP?       Last growth scan: __________________________________________

## 2022-11-14 NOTE — LETTER
NST sleeve cover sheet    Patient name: Murtaza Hernandez  : 1995  MRN: [id number]    THEO: Estimated Date of Delivery: 22    Obstetrician: _______________________________    Reason(s) for testing:  __________________________________________      Testing frequency:    ___ 2x/wk  ___ 1x/wk  ___ Dopplers  ___ BPP?       Last growth scan: __________________________________________

## 2022-11-18 ENCOUNTER — ROUTINE PRENATAL (OUTPATIENT)
Dept: OBGYN CLINIC | Facility: CLINIC | Age: 27
End: 2022-11-18

## 2022-11-18 VITALS — SYSTOLIC BLOOD PRESSURE: 120 MMHG | DIASTOLIC BLOOD PRESSURE: 78 MMHG | WEIGHT: 293 LBS | BODY MASS INDEX: 49.71 KG/M2

## 2022-11-18 DIAGNOSIS — Z34.93 PRENATAL CARE IN THIRD TRIMESTER: Primary | ICD-10-CM

## 2022-11-18 DIAGNOSIS — Z3A.36 36 WEEKS GESTATION OF PREGNANCY: ICD-10-CM

## 2022-11-18 NOTE — PATIENT INSTRUCTIONS
Pregnancy at 28 to 38 Weeks   AMBULATORY CARE:   Changes happening with your body: You are considered full term at the beginning of 37 weeks  Your breathing may be easier if your baby has moved down into a head-down position  You may need to urinate more often because the baby may be pressing on your bladder  You may also feel more discomfort and get tired easily  Seek care immediately if:   You develop a severe headache that does not go away  You have new or increased vision changes, such as blurred or spotted vision  You have new or increased swelling in your face or hands  You have vaginal spotting or bleeding  Your water broke or you feel warm water gushing or trickling from your vagina  Call your obstetrician if:   You have more than 5 contractions in 1 hour  You notice any changes in your baby's movements  You have abdominal cramps, pressure, or tightening  You have a change in vaginal discharge  You have chills or a fever  You have vaginal itching, burning, or pain  You have yellow, green, white, or foul-smelling vaginal discharge  You have pain or burning when you urinate, less urine than usual, or pink or bloody urine  You have questions or concerns about your condition or care  How to care for yourself at this stage of your pregnancy:       Eat a variety of healthy foods  Healthy foods include fruits, vegetables, whole-grain breads, low-fat dairy foods, beans, lean meats, and fish  Drink liquids as directed  Ask how much liquid to drink each day and which liquids are best for you  Limit caffeine to less than 200 milligrams each day  Limit your intake of fish to 2 servings each week  Choose fish low in mercury such as canned light tuna, shrimp, salmon, cod, or tilapia  Do not  eat fish high in mercury such as swordfish, tilefish, flavio mackerel, and shark  Take prenatal vitamins as directed    Your need for certain vitamins and minerals, such as folic acid, increases during pregnancy  Prenatal vitamins provide some of the extra vitamins and minerals you need  Prenatal vitamins may also help to decrease the risk of certain birth defects  Rest as needed  Put your feet up if you have swelling in your ankles and feet  Talk to your healthcare provider about exercise  Moderate exercise can help you stay fit  Your healthcare provider will help you plan an exercise program that is safe for you during pregnancy  Do not smoke  Smoking increases your risk of a miscarriage and other health problems during your pregnancy  Smoking can cause your baby to be born early or weigh less at birth  Ask your healthcare provider for information if you need help quitting  Do not drink alcohol  Alcohol passes from your body to your baby through the placenta  It can affect your baby's brain development and cause fetal alcohol syndrome (FAS)  FAS is a group of conditions that causes mental, behavior, and growth problems  Talk to your healthcare provider before you take any medicines  Many medicines may harm your baby if you take them when you are pregnant  Do not take any medicines, vitamins, herbs, or supplements without first talking to your healthcare provider  Never use illegal or street drugs (such as marijuana or cocaine) while you are pregnant  Safety tips during pregnancy:   Avoid hot tubs and saunas  Do not use a hot tub or sauna while you are pregnant, especially during your first trimester  Hot tubs and saunas may raise your baby's temperature and increase the risk of birth defects  Avoid toxoplasmosis  This is an infection caused by eating raw meat or being around infected cat feces  It can cause birth defects, miscarriages, and other problems  Wash your hands after you touch raw meat  Make sure any meat is well-cooked before you eat it  Avoid raw eggs and unpasteurized milk   Use gloves or ask someone else to clean your cat's litter box while you are pregnant  Ask your healthcare provider about travel  The most comfortable time to travel is during the second trimester  Ask your provider if you can travel after 36 weeks  You may not be able to travel in an airplane after 36 weeks  He or she may also recommend you avoid long road trips  Changes happening with your baby:  By 38 weeks, your baby may weigh between 6 and 9 pounds  Your baby may be about 14 inches long from the top of the head to the rump (baby's bottom)  Your baby hears well enough to know your voice  As your baby gets larger, you may feel fewer kicks and more stretching and rolling  Your baby may move into a head-down position  Your baby will also rest lower in your abdomen  What you need to know about prenatal care: Your healthcare provider will check your blood pressure and weight  You may also need the following:  A urine test  may also be done to check for sugar and protein  These can be signs of gestational diabetes or infection  Protein in your urine may also be a sign of preeclampsia  Preeclampsia is a condition that can develop during week 20 or later of your pregnancy  It causes high blood pressure, and it can cause problems with your kidneys and other organs  A gestational diabetes screen  may be done  Your healthcare provider may order either a 1-step or 2-step oral glucose tolerance test (OGTT)  1-step OGTT:  Your blood sugar level will be tested after you have not eaten for 8 hours (fasting)  You will then be given a glucose drink  Your level will be tested again 1 hour and 2 hours after you finish the drink  2-step OGTT:  You do not have to fast for the first part of the test  You will have the glucose drink at any time of day  Your blood sugar level will be checked 1 hour later  If your blood sugar is higher than a certain level, another test will be ordered  You will fast and your blood sugar level will be tested  You will have the glucose drink  Your blood will be tested again 1 hour, 2 hours, and 3 hours after you finish the glucose drink  A blood test  may be done to check for anemia (low iron level)  A Tdap vaccine  may be recommended by your healthcare provider  A group B strep test  is a test that is done to check for group B strep infection  Group B strep is a type of bacteria that may be found in the vagina or rectum  It can be passed to your baby during delivery if you have it  Your healthcare provider will take swab your vagina or rectum and send the sample to the lab for tests  Fundal height  is a measurement of your uterus to check your baby's growth  This number is usually the same as the number of weeks that you have been pregnant  Your healthcare provider may also check your baby's position  Your baby's heart rate  will be checked  Follow up with your obstetrician as directed:  Write down your questions so you remember to ask them during your visits  © [employer] 2022 Information is for End User's use only and may not be sold, redistributed or otherwise used for commercial purposes  All illustrations and images included in CareNotes® are the copyrighted property of [employer] D A M , Inc  or [employer]   The above information is an  only  It is not intended as medical advice for individual conditions or treatments  Talk to your doctor, nurse or pharmacist before following any medical regimen to see if it is safe and effective for you  Kick Counts in Pregnancy   WHAT YOU NEED TO KNOW:   Kick counts measure how much your baby is moving in your womb  A kick from your baby can be felt as a twist, turn, swish, roll, or jab  It is common to feel your baby kicking at 26 to 28 weeks of pregnancy  You may feel your baby kick as early as 20 weeks of pregnancy  You may want to start counting at 28 weeks     DISCHARGE INSTRUCTIONS:   Contact your doctor immediately if:   You feel a change in the number of kicks or movements of your baby  You feel fewer than 10 kicks within 2 hours  You have questions or concerns about your baby's movements  Why measure kick counts:  Your baby's movement may provide information about your baby's health  He or she may move less, or not at all, if there are problems  Your baby may move less if he or she is not getting enough oxygen or nutrition from the placenta  Do not smoke while you are pregnant  Smoking decreases the amount of oxygen that gets to your baby  Talk to your healthcare provider if you need help to quit smoking  Tell your healthcare provider as soon as you feel a change in your baby's movements  When to measure kick counts:   Measure kick counts at the same time every day  Measure kick counts when your baby is awake and most active  Your baby may be most active in the evening  How to measure kick counts:  Check that your baby is awake before you measure kick counts  You can wake up your baby by lightly pushing on your belly, walking, or drinking something cold  Your healthcare provider may tell you different ways to measure kick counts  You may be told to do the following:  Use a chart or clock to keep track of the time you start and finish counting  Sit in a chair or lie on your left side  Place your hands on the largest part of your belly  Count until you reach 10 kicks  Write down how much time it takes to count 10 kicks  It may take 30 minutes to 2 hours to count 10 kicks  It should not take more than 2 hours to count 10 kicks  Follow up with your doctor as directed:  Write down your questions so you remember to ask them during your visits  © Copyright [employer] 2022 Information is for End User's use only and may not be sold, redistributed or otherwise used for commercial purposes   All illustrations and images included in CareNotes® are the copyrighted property of [employer]  or Jaya [employer]  The above information is an  only  It is not intended as medical advice for individual conditions or treatments  Talk to your doctor, nurse or pharmacist before following any medical regimen to see if it is safe and effective for you

## 2022-11-18 NOTE — PROGRESS NOTES
Joanie Llanes is a 27 yo  at 36w5d presenitng for routine PNC- denies any CTX but does report increase in pelvci pressure  Denies LOF or VB  Endorses good FM  SVE /-4, firm, mid position  Has noted some HA that resolve on their own and rest without meds- BP today 120/78- reviewed preE precautions and to call with any concerns  GBS collected today and reviewed     RTO in 1 week

## 2022-11-20 LAB — GP B STREP DNA SPEC QL NAA+PROBE: POSITIVE

## 2022-11-23 ENCOUNTER — ULTRASOUND (OUTPATIENT)
Dept: PERINATAL CARE | Facility: OTHER | Age: 27
End: 2022-11-23

## 2022-11-23 VITALS
WEIGHT: 293 LBS | BODY MASS INDEX: 47.09 KG/M2 | HEIGHT: 66 IN | HEART RATE: 87 BPM | DIASTOLIC BLOOD PRESSURE: 82 MMHG | SYSTOLIC BLOOD PRESSURE: 120 MMHG

## 2022-11-23 DIAGNOSIS — O99.213 MATERNAL MORBID OBESITY IN THIRD TRIMESTER, ANTEPARTUM (HCC): Primary | ICD-10-CM

## 2022-11-23 DIAGNOSIS — E66.01 MATERNAL MORBID OBESITY IN THIRD TRIMESTER, ANTEPARTUM (HCC): Primary | ICD-10-CM

## 2022-11-23 DIAGNOSIS — Z3A.37 37 WEEKS GESTATION OF PREGNANCY: ICD-10-CM

## 2022-11-23 NOTE — PATIENT INSTRUCTIONS
Kick Counts in Pregnancy   WHAT YOU NEED TO KNOW:   What do I need to know about kick counts? Kick counts measure how much your baby is moving in your womb  A kick from your baby can be felt as a twist, turn, swish, roll, or jab  It is common to feel your baby kicking at 26 to 28 weeks of pregnancy  You may feel your baby kick as early as 20 weeks of pregnancy  You may want to start counting at 28 weeks  Why should I measure kick counts? Your baby's movement may provide information about your baby's health  He or she may move less, or not at all, if there are problems  Your baby may move less if he or she is not getting enough oxygen or nutrition from the placenta  Do not smoke while you are pregnant  Smoking decreases the amount of oxygen that gets to your baby  Talk to your healthcare provider if you need help to quit smoking  Tell your healthcare provider as soon as you feel a change in your baby's movements  When do I measure kick counts? Measure kick counts at the same time every day  Measure kick counts when your baby is awake and most active  Your baby may be most active in the evening  How do I measure kick counts? Check that your baby is awake before you measure kick counts  You can wake up your baby by lightly pushing on your belly, walking, or drinking something cold  Your healthcare provider may tell you different ways to measure kick counts  You may be told to do the following:  Use a chart or clock to keep track of the time you start and finish counting  Sit in a chair or lie on your left side  Place your hands on the largest part of your belly  Count until you reach 10 kicks  Write down how much time it takes to count 10 kicks  It may take 30 minutes to 2 hours to count 10 kicks  It should not take more than 2 hours to count 10 kicks  When should I contact my doctor? You feel a change in the number of kicks or movements of your baby        You feel fewer than 10 kicks within 2 hours  You have questions or concerns about your baby's movements  CARE AGREEMENT:   You have the right to help plan your care  Learn about your health condition and how it may be treated  Discuss treatment options with your healthcare providers to decide what care you want to receive  You always have the right to refuse treatment  The above information is an  only  It is not intended as medical advice for individual conditions or treatments  Talk to your doctor, nurse or pharmacist before following any medical regimen to see if it is safe and effective for you  © Copyright [employer] 2022 Information is for End User's use only and may not be sold, redistributed or otherwise used for commercial purposes   All illustrations and images included in CareNotes® are the copyrighted property of A D A M , Inc  or [address] [place] [address]

## 2022-11-23 NOTE — LETTER
November 23, 2022     Maryanne Arellano, [address] [id number]    Patient: Eunice Arguelles   YOB: 1995   Date of Visit: 11/23/2022       Dear Dr Teri Palma:    Thank you for referring Eunice Arguelles to me for evaluation  Below are my notes for this consultation  If you have questions, please do not hesitate to call me  I look forward to following your patient along with you  Sincerely,        Monet Dc MD        CC: No Recipients  Monet Dc MD  11/23/2022  4:25 PM  Sign when Signing Visit  NST is reactive and reassuring   Monet Dc

## 2022-11-23 NOTE — PROGRESS NOTES
NST was done today  Pt  Reported active fetal movement at home between visit  Daily fetal kick count reviewed and emphasized  Patient verbalized understanding of all and was receptive      Benigno Hampton RN

## 2022-11-25 ENCOUNTER — OFFICE VISIT (OUTPATIENT)
Dept: POSTPARTUM | Facility: CLINIC | Age: 27
End: 2022-11-25

## 2022-11-25 VITALS — SYSTOLIC BLOOD PRESSURE: 120 MMHG | DIASTOLIC BLOOD PRESSURE: 80 MMHG

## 2022-11-25 DIAGNOSIS — Z71.89 ENCOUNTER FOR BREAST FEEDING COUNSELING: Primary | ICD-10-CM

## 2022-11-25 NOTE — PROGRESS NOTES
INITIAL BREAST FEEDING EVALUATION    Informant/Relationship: Cyrus Cameron    Discussion of General Lactation Issues: Cyrus Cameron is expecting her second child  She is looking for support as she plans to breastfeed  She struggled to breastfeed her first child who would not latch  Cyrus Cameron feels that she wouldn't latch because Cyrus Cameron has inverted nipples  She tried to pump but was unable to establish adequate milk production   History:  Fertility Problem:no  Breast changes:no  : No   Cyrus Cameron is scheduled for a repeat   Full term: Cyrus Cameron is scheduled to deliver at 44 weeks   labor:no      Past Medical History:   Diagnosis Date   • Morbid obesity with BMI of 45 0-49 9, adult ([facility] [address] )          Current Outpatient Medications:   •  Blood Glucose Monitoring Suppl (OneTouch Verio Flex System) w/Device KIT, Use, Disp: , Rfl:   •  OneTouch Delica Lancets 75C MISC, Use 4 a day or as directed , Disp: 100 each, Rfl: 6  •  OneTouch Verio test strip, Test 4 times a day and as instructed  Gestational diabetes  , Disp: 100 each, Rfl: 6  •  Prenatal MV-Min-Fe Fum-FA-DHA (PRENATAL+DHA PO), Take by mouth daily, Disp: , Rfl:     No Known Allergies    Social History     Substance and Sexual Activity   Drug Use No       Social History Never a smoker      Equipment:  Pump            Type: Pumply            Frequency of Use: none        Mom:  Breast: Medium sized symmetrical breasts  Rounded shape  Widely spaced  Nipple Assessment in General: Slightly flat nipples elizabeth easily with stimulation  Support System: FOB, siblings and mother  History of Breastfeeding: Ramya's older child never latched  Ramya pumped for <1 month but never established full production  Changes/Stressors/Violence: Cyrus Cameron is concerned that she will not be able to produce enough milk for her baby  Concerns/Goals: Cyrus Cameron desires to feed her baby her milk as much as possible    She is open to mixed feeding    Problems with Mom: concerns with milk production    Physical Exam  Constitutional:       Appearance: She is obese  HENT:      Head: Normocephalic and atraumatic  Cardiovascular:      Rate and Rhythm: Normal rate and regular rhythm  Pulses: Normal pulses  Heart sounds: Normal heart sounds  Pulmonary:      Effort: Pulmonary effort is normal       Breath sounds: Normal breath sounds  Musculoskeletal:         General: Normal range of motion  Cervical back: Normal range of motion and neck supple  Neurological:      Mental Status: She is alert and oriented to person, place, and time  Skin:     General: Skin is warm and dry  Psychiatric:         Mood and Affect: Mood normal          Behavior: Behavior normal          Thought Content: Thought content normal          Judgment: Judgment normal            Handouts:   Hands on pumping and Hand expression    Education:  Reviewed Latch: Discussed the importance of early and frequent effective latch to stimulate milk production  Reviewed Frequency/Supply & Demand: Discussed how milk production is established and maintained  Discussed factors that influence milk production  Reviewed Mom/Breast care: Discussed how to manage engorgement  Reviewed Equipment: Discussed the importance of using a high quality pump effectively and frequently if baby is not latching  Discussed the limitations of a wearable pump      Plan:   Reassurance and support given  I assured Yoav Bravo that I am available to continue to support her towards her feeding goals  I recommended that Yoav Bravo take a prenatal breastfeeding class  I stressed the importance of early and frequent skin to skin and establishing effective breastfeeding within the first hour or so after delivery  I encouraged her to seek help from hospital staff  We discussed  hand expression beginning at 38 weeks  She was given information about effective hand expression technique    I encouraged her to take any expressed colostrum with her to the hospital to be used as supplement for her baby  I recommended that she begin hand expression/puming within the first two hours after delivery if her baby is not latching  I encouraged her to use the hospital grade pump while in the hospital   I also recommended that she acquire a high quality pump for home use  We discussed potential reasons for her low milk production in the past   I suggested she speak with her OB about labs to diagnose hormonal reasons for her low production if she desires  I encouraged her to follow up after discharge for more support and close monitoring of her infant's weight gain  I encouraged her to call with any questions or concerns  I have spent 60 minutes with Patient  today in which greater than 50% of this time was spent in counseling/coordination of care regarding Patient and family education

## 2022-11-25 NOTE — PATIENT INSTRUCTIONS
I encourage you to take a prenatal breastfeeding class ASAP  Begin hand expression a few times a day  Refer to your hand out for instructions and watch the videos as you learn  If you are able to express colostrum, you can collect it in sterile syringes and freeze it  Take it to the hospital with you the day you are scheduled to deliver  Tell your care team that you desire immediate skin to skin with your baby and assistance with first latch within an hour of delivery  Spend as much time as you can skin to skin with your baby in the early weeks  Offer the breast at every feeding cue  Get help with positioning if your baby is struggling to latch and suckle effectively at the breast   If your baby is not latching well or needs to be supplemented, begin pumping immediately  The hospital will have a pump available to you  Ask for instructions on how to use it effectively and advice on which flange size to use  Obtain a high quality pump for use at home  Your wearable pump may not be the best choice during the early weeks  Speak with your OB about potential hormonal issues that may contribute to low milk production  If you desire, identifying and treating these issues may help with milk production over time  Follow up for more support soon after discharge  Please call with any questions or concerns

## 2022-11-28 ENCOUNTER — ULTRASOUND (OUTPATIENT)
Dept: PERINATAL CARE | Facility: OTHER | Age: 27
End: 2022-11-28

## 2022-11-28 ENCOUNTER — ROUTINE PRENATAL (OUTPATIENT)
Dept: OBGYN CLINIC | Facility: CLINIC | Age: 27
End: 2022-11-28

## 2022-11-28 VITALS
DIASTOLIC BLOOD PRESSURE: 80 MMHG | WEIGHT: 293 LBS | SYSTOLIC BLOOD PRESSURE: 128 MMHG | BODY MASS INDEX: 47.09 KG/M2 | HEART RATE: 94 BPM | HEIGHT: 66 IN

## 2022-11-28 VITALS — DIASTOLIC BLOOD PRESSURE: 70 MMHG | WEIGHT: 293 LBS | SYSTOLIC BLOOD PRESSURE: 110 MMHG | BODY MASS INDEX: 50.2 KG/M2

## 2022-11-28 DIAGNOSIS — O99.213 MATERNAL MORBID OBESITY IN THIRD TRIMESTER, ANTEPARTUM (HCC): ICD-10-CM

## 2022-11-28 DIAGNOSIS — Z34.93 PRENATAL CARE, THIRD TRIMESTER: ICD-10-CM

## 2022-11-28 DIAGNOSIS — O24.410 DIET CONTROLLED GESTATIONAL DIABETES MELLITUS (GDM) IN THIRD TRIMESTER: Primary | ICD-10-CM

## 2022-11-28 DIAGNOSIS — O34.219 HISTORY OF CESAREAN DELIVERY, ANTEPARTUM: ICD-10-CM

## 2022-11-28 DIAGNOSIS — Z3A.38 38 WEEKS GESTATION OF PREGNANCY: ICD-10-CM

## 2022-11-28 DIAGNOSIS — Z3A.38 38 WEEKS GESTATION OF PREGNANCY: Primary | ICD-10-CM

## 2022-11-28 DIAGNOSIS — E66.01 MATERNAL MORBID OBESITY IN THIRD TRIMESTER, ANTEPARTUM (HCC): ICD-10-CM

## 2022-11-28 NOTE — LETTER
2022     Wilman Garcia, [address]    Patient: Moraima Brooks   YOB: 1995   Date of Visit: 2022       Dear Dr Hudson Forward: Thank you for referring Moraima Brooks to me for evaluation  Below are my notes for this consultation  If you have questions, please do not hesitate to call me  I look forward to following your patient along with you  Sincerely,        Guicho Lai MD        CC: No Recipients  Guicho Lai MD  2022  6:57 PM  Sign when Signing Visit  Moraima Brooks  has no complaints today at 38w1d  She reports fetal movements and does not report any vaginal bleeding or signs of labor  Problem list:  1  Diet-controlled gestational diabetes doing well on diet  2  Pre gravid BMI of 49  3  History of a prior  and is planning on a repeat  on 2022    Ultrasound findings: The ultrasound today shows a fetus with normal growth and fluid  Her placenta is not near her prior  scar  NST is reactive and reassuring    Pregnancy ultrasound has limitations and is unable to detect all forms of fetal congenital abnormalities  Follow up recommended:   1  No further growth scans are recommended at this time  2  No further nonstress tests are recommended since she has a  scheduled for 7 days from today  Pre visit time reviewing her records   5 minutes  Face to face time 5 minutes  Post visit time on documentation of note, updating her problem list, adding orders and prescriptions 5 minutes  Procedures that were completed today were charged separately  The level of decision making was straight forward      Guicho Lai MD

## 2022-11-28 NOTE — PROGRESS NOTES
Nikhil Garrido  has no complaints today at 38w1d  She reports fetal movements and does not report any vaginal bleeding or signs of labor  Problem list:  1  Diet-controlled gestational diabetes doing well on diet  2  Pre gravid BMI of 49  3  History of a prior  and is planning on a repeat  on 2022    Ultrasound findings: The ultrasound today shows a fetus with normal growth and fluid  Her placenta is not near her prior  scar  NST is reactive and reassuring    Pregnancy ultrasound has limitations and is unable to detect all forms of fetal congenital abnormalities  Follow up recommended:   1  No further growth scans are recommended at this time  2  No further nonstress tests are recommended since she has a  scheduled for 7 days from today  Pre visit time reviewing her records   5 minutes  Face to face time 5 minutes  Post visit time on documentation of note, updating her problem list, adding orders and prescriptions 5 minutes  Procedures that were completed today were charged separately  The level of decision making was straight forward      Rhonda Maher MD

## 2022-11-28 NOTE — PROGRESS NOTES
I have reviewed the notes, assessments, and/or procedures performed by Delilah Grissom RN, IBCLC, I concur with her/his documentation of Meeta Buck MD 11/27/22

## 2022-11-28 NOTE — PATIENT INSTRUCTIONS
Pregnancy at 28 to 38 Weeks   AMBULATORY CARE:   Changes happening with your body: You are considered full term at the beginning of 37 weeks  Your breathing may be easier if your baby has moved down into a head-down position  You may need to urinate more often because the baby may be pressing on your bladder  You may also feel more discomfort and get tired easily  Seek care immediately if:   You develop a severe headache that does not go away  You have new or increased vision changes, such as blurred or spotted vision  You have new or increased swelling in your face or hands  You have vaginal spotting or bleeding  Your water broke or you feel warm water gushing or trickling from your vagina  Call your obstetrician if:   You have more than 5 contractions in 1 hour  You notice any changes in your baby's movements  You have abdominal cramps, pressure, or tightening  You have a change in vaginal discharge  You have chills or a fever  You have vaginal itching, burning, or pain  You have yellow, green, white, or foul-smelling vaginal discharge  You have pain or burning when you urinate, less urine than usual, or pink or bloody urine  You have questions or concerns about your condition or care  How to care for yourself at this stage of your pregnancy:       Eat a variety of healthy foods  Healthy foods include fruits, vegetables, whole-grain breads, low-fat dairy foods, beans, lean meats, and fish  Drink liquids as directed  Ask how much liquid to drink each day and which liquids are best for you  Limit caffeine to less than 200 milligrams each day  Limit your intake of fish to 2 servings each week  Choose fish low in mercury such as canned light tuna, shrimp, salmon, cod, or tilapia  Do not  eat fish high in mercury such as swordfish, tilefish, flavio mackerel, and shark  Take prenatal vitamins as directed    Your need for certain vitamins and minerals, such as folic acid, increases during pregnancy  Prenatal vitamins provide some of the extra vitamins and minerals you need  Prenatal vitamins may also help to decrease the risk of certain birth defects  Rest as needed  Put your feet up if you have swelling in your ankles and feet  Talk to your healthcare provider about exercise  Moderate exercise can help you stay fit  Your healthcare provider will help you plan an exercise program that is safe for you during pregnancy  Do not smoke  Smoking increases your risk of a miscarriage and other health problems during your pregnancy  Smoking can cause your baby to be born early or weigh less at birth  Ask your healthcare provider for information if you need help quitting  Do not drink alcohol  Alcohol passes from your body to your baby through the placenta  It can affect your baby's brain development and cause fetal alcohol syndrome (FAS)  FAS is a group of conditions that causes mental, behavior, and growth problems  Talk to your healthcare provider before you take any medicines  Many medicines may harm your baby if you take them when you are pregnant  Do not take any medicines, vitamins, herbs, or supplements without first talking to your healthcare provider  Never use illegal or street drugs (such as marijuana or cocaine) while you are pregnant  Safety tips during pregnancy:   Avoid hot tubs and saunas  Do not use a hot tub or sauna while you are pregnant, especially during your first trimester  Hot tubs and saunas may raise your baby's temperature and increase the risk of birth defects  Avoid toxoplasmosis  This is an infection caused by eating raw meat or being around infected cat feces  It can cause birth defects, miscarriages, and other problems  Wash your hands after you touch raw meat  Make sure any meat is well-cooked before you eat it  Avoid raw eggs and unpasteurized milk   Use gloves or ask someone else to clean your cat's litter box while you are pregnant  Ask your healthcare provider about travel  The most comfortable time to travel is during the second trimester  Ask your provider if you can travel after 36 weeks  You may not be able to travel in an airplane after 36 weeks  He or she may also recommend you avoid long road trips  Changes happening with your baby:  By 38 weeks, your baby may weigh between 6 and 9 pounds  Your baby may be about 14 inches long from the top of the head to the rump (baby's bottom)  Your baby hears well enough to know your voice  As your baby gets larger, you may feel fewer kicks and more stretching and rolling  Your baby may move into a head-down position  Your baby will also rest lower in your abdomen  What you need to know about prenatal care: Your healthcare provider will check your blood pressure and weight  You may also need the following:  A urine test  may also be done to check for sugar and protein  These can be signs of gestational diabetes or infection  Protein in your urine may also be a sign of preeclampsia  Preeclampsia is a condition that can develop during week 20 or later of your pregnancy  It causes high blood pressure, and it can cause problems with your kidneys and other organs  A gestational diabetes screen  may be done  Your healthcare provider may order either a 1-step or 2-step oral glucose tolerance test (OGTT)  1-step OGTT:  Your blood sugar level will be tested after you have not eaten for 8 hours (fasting)  You will then be given a glucose drink  Your level will be tested again 1 hour and 2 hours after you finish the drink  2-step OGTT:  You do not have to fast for the first part of the test  You will have the glucose drink at any time of day  Your blood sugar level will be checked 1 hour later  If your blood sugar is higher than a certain level, another test will be ordered  You will fast and your blood sugar level will be tested  You will have the glucose drink  Your blood will be tested again 1 hour, 2 hours, and 3 hours after you finish the glucose drink  A blood test  may be done to check for anemia (low iron level)  A Tdap vaccine  may be recommended by your healthcare provider  A group B strep test  is a test that is done to check for group B strep infection  Group B strep is a type of bacteria that may be found in the vagina or rectum  It can be passed to your baby during delivery if you have it  Your healthcare provider will take swab your vagina or rectum and send the sample to the lab for tests  Fundal height  is a measurement of your uterus to check your baby's growth  This number is usually the same as the number of weeks that you have been pregnant  Your healthcare provider may also check your baby's position  Your baby's heart rate  will be checked  Follow up with your obstetrician as directed:  Write down your questions so you remember to ask them during your visits  © Copyright [employer] 2022 Information is for End User's use only and may not be sold, redistributed or otherwise used for commercial purposes  All illustrations and images included in CareNotes® are the copyrighted property of [employer]  or [facility]   The above information is an  only  It is not intended as medical advice for individual conditions or treatments  Talk to your doctor, nurse or pharmacist before following any medical regimen to see if it is safe and effective for you

## 2022-11-28 NOTE — PROGRESS NOTES
Pilar Arrington is a 28 y o   38w1d  Reports ++FM, no LOF, VB, or contractions       Vitals:    22 1327   BP: 110/70    S=D  +FHTs    A/P:  GDM1- delivery 39-40 wks- thrid trimester growth  Normal fetal echo  weekly APFS at 34 wks due to BMI  repeat c/s  @ 10am, delivery consent in chart, provided soap today    Contraception: not BS  Breastfeeding: plans, has pump    Discussed term labor precautions  Return to office in 1 week

## 2022-11-28 NOTE — PROGRESS NOTES
Repeat Non-Stress Testing:    Patient verbalizes +FM  Pt denies ALL:               Leaking of fluid   Contractions   Vaginal bleeding   Decreased fetal movement    Patient is performing daily kick counts  Patient has no questions or concerns  NST strip reviewed by Dr Marah Vazquez

## 2022-12-03 NOTE — PRE-PROCEDURE INSTRUCTIONS
Phone call to patient for pre-operative instructions prior to  Monday Dec 5, 2022    Pt was instructed to arrive 2 hours before her scheduled 1000 OR time  (If the patient is RH negative, she should be told to come in 2 1/2 hours before scheduled OR)    Pt instructed to remain NPO after midnight  *This includes gum, water and hard candy  *If patient takes AM meds (e g hypertensive meds) they may take these meds with a sip of water  *This should not include medications like prenatal vitamins Aspirin or colace  *Type 1 diabetics should stay on their normal insulin regime or as ordered by their doctor  Type 2 on insulin should take 1/2 dose of their overnight insulin or as instructed by their doctor  Pt should brush their teeth as usual     Pt was instructed to buy and use a pre-surgical wash containing chlorhexidine the night before and the morning of her scheduled  and to wear clean clothing after the wash  Pt instructed not to shave operative area prior to surgery  Pt was asked not to wear any jewelry and to leave all of her valuables at home  Pt was asked to leave all of her larger bags and suitcases in the car to be brought in after she is assigned a post partum room  Pt was informed that she may have 1 support person in the OR/PACU area and of the current visiting policies

## 2022-12-04 PROBLEM — Z3A.39 39 WEEKS GESTATION OF PREGNANCY: Status: ACTIVE | Noted: 2022-06-07

## 2022-12-04 NOTE — H&P
[address] [place] [address] Way 28 y o  female MRN: [id number]  Unit/Bed#: LD TRIAGE  Encounter: [id number]    Assessment: 28 y o  Letta Dubin at 39w0d admitted for RLTCS  FHT: 145bpm  Clinical EFW: 7 5lb ; Cephalic confirmed by TAUS  GBS status: positive     Plan:   Diet controlled gestational diabetes mellitus (GDM) in third trimester  Assessment & Plan  Lab Results   Component Value Date    HGBA1C 5 0 2022       No results for input(s): POCGLU in the last 72 hours  Blood Sugar Average: Last 72 hrs:     Obtain fingerstick on admission    Maternal morbid obesity in third trimester, antepartum ([address] )  Assessment & Plan  BMI 50    * 39 weeks gestation of pregnancy  Assessment & Plan  Admit   T&S, CBC, RPR  NPO  IV fluids  3g IV Ancef  To OR for RLTCS        Discussed case and plan w/ Dr Nasreen Dia      Chief Complaint: RLTCS    HPI: Ánegl Olvera is a 28 y o  Letta Dubin with an THEO of 2022, by Last Menstrual Period at 39w0d who is being admitted for RLTCS  She denies having uterine contractions, has no LOF, and reports no VB  She states she has felt good FM      Patient Active Problem List   Diagnosis   • Maternal morbid obesity in third trimester, antepartum ([address] )   • Diet controlled gestational diabetes mellitus (GDM) in third trimester   • 39 weeks gestation of pregnancy   • History of  delivery, antepartum   • Prenatal care in third trimester       Baby complications/comments:   2022  MEASUREMENTS (* Included In Average GA)     AC              34 2 cm        38 weeks 1 day * (68%)  BPD              9 4 cm        38 weeks 1 day * (73%)  HC              34 8 cm        40 weeks 3 days* (82%)  Femur            7 3 cm        37 weeks 3 days* (33%)     Cerebellum       5 6 cm        38 weeks 2 days     HC/AC           1 02 [0 92 - 1 05]                 (61%)  FL/AC             21 [20 - 24]  FL/BPD            78 [71 - 87]  EFW [place] 4   3425 grams - 7 lbs 9 oz                 (67%)    Review of Systems   Constitutional: Negative for chills and fever  HENT: Negative for congestion, sinus pressure and sinus pain  Eyes: Negative for visual disturbance  Respiratory: Negative for cough, shortness of breath and wheezing  Cardiovascular: Negative for chest pain, palpitations and leg swelling  Gastrointestinal: Negative for abdominal pain, constipation, diarrhea, nausea and vomiting  Genitourinary: Negative for dysuria, vaginal bleeding and vaginal discharge  Skin: Negative for color change, pallor and rash  Neurological: Negative for weakness and light-headedness  Psychiatric/Behavioral: Negative for agitation and behavioral problems  OB Hx:  OB History    Para Term  AB Living   2 1 1     1   SAB IAB Ectopic Multiple Live Births           1      # Outcome Date GA Lbr Terrance/2nd Weight Sex Delivery Anes PTL Lv   2 Current            1 Term 14 40w0d  3600 g (7 lb 15 oz) F CS-Unspec EPI  KRYSTAL      Complications: Fetal Intolerance       Past Medical Hx:  Past Medical History:   Diagnosis Date   • Morbid obesity with BMI of 45 0-49 9, adult [facility])        Past Surgical hx:  Past Surgical History:   Procedure Laterality Date   •  SECTION, LOW TRANSVERSE  2014    LAST ASSESSED: 1/8/15         No Known Allergies    Medications Prior to Admission   Medication   • Blood Glucose Monitoring Suppl (OneTouch Verio Flex System) w/Device KIT   • OneTouch Delica Lancets 00Z MISC   • OneTouch Verio test strip   • Prenatal MV-Min-Fe Fum-FA-DHA (PRENATAL+DHA PO)       Objective:  Temp:  [98 6 °F (37 °C)] 98 6 °F (37 °C)  HR:  [113] 113  Resp:  [16] 16  BP: (130)/(79) 130/79  Body mass index is 50 2 kg/m²  Physical Exam:  Physical Exam  Constitutional:       General: She is not in acute distress  Appearance: She is obese  HENT:      Head: Normocephalic  Right Ear: External ear normal       Left Ear: External ear normal    Eyes:      General: No scleral icterus  Right eye: No discharge  Left eye: No discharge  Conjunctiva/sclera: Conjunctivae normal    Cardiovascular:      Rate and Rhythm: Normal rate and regular rhythm  Pulses: Normal pulses  Heart sounds: Normal heart sounds  Pulmonary:      Effort: Pulmonary effort is normal  No respiratory distress  Breath sounds: Normal breath sounds  Abdominal:      Palpations: Abdomen is soft  Tenderness: There is no abdominal tenderness  There is no guarding  Comments: Gravid uterus   Musculoskeletal:         General: No swelling or tenderness  Normal range of motion  Cervical back: Normal range of motion  Right lower leg: No edema  Left lower leg: No edema  Neurological:      Mental Status: She is alert and oriented to person, place, and time  Mental status is at baseline  Skin:     General: Skin is warm and dry  Capillary Refill: Capillary refill takes less than 2 seconds  Psychiatric:         Behavior: Behavior normal          Thought Content: Thought content normal    Vitals and nursing note reviewed  Exam conducted with a chaperone present              FHT:  Baseline Rate: 145 bpm  Variability: Moderate 6-25 bpm  Accelerations: 15 x 15 or greater  Decelerations: None  FHR Category: Category I    TOCO:   Contraction Frequency (minutes): irritability    Lab Results   Component Value Date    WBC 11 65 (H) 12/05/2022    HGB 11 1 (L) 12/05/2022    HCT 34 3 (L) 12/05/2022     12/05/2022     Lab Results   Component Value Date    K 4 4 09/23/2022     09/23/2022    CO2 27 09/23/2022    BUN 8 09/23/2022    CREATININE 0 52 (L) 09/23/2022    AST 29 09/23/2022    ALT 60 09/23/2022     Prenatal Labs: Reviewed     Blood type: A pos  Antibody: neg  GBS: pos  HIV: neg  Rubella: Immune  VDRL/RPR: Non reactive  HBsAg: Negative  Chlamydia: Negative  Gonorrhea: Negative  Diabetes 1 hour screen: elev  3 hour glucose: elev  Platelets: 381  Hgb: 11 1  >2 Midnights  INPATIENT     Signature/Title: Riccardo Causey MD  Date: 12/5/2022  Time: 9:28 AM

## 2022-12-05 ENCOUNTER — ANESTHESIA (INPATIENT)
Dept: LABOR AND DELIVERY | Facility: HOSPITAL | Age: 27
End: 2022-12-05

## 2022-12-05 ENCOUNTER — HOSPITAL ENCOUNTER (INPATIENT)
Facility: HOSPITAL | Age: 27
LOS: 3 days | Discharge: HOME | End: 2022-12-08
Attending: STUDENT IN AN ORGANIZED HEALTH CARE EDUCATION/TRAINING PROGRAM | Admitting: STUDENT IN AN ORGANIZED HEALTH CARE EDUCATION/TRAINING PROGRAM

## 2022-12-05 ENCOUNTER — ANESTHESIA (INPATIENT)
Dept: PERIOP | Facility: HOSPITAL | Age: 27
End: 2022-12-05

## 2022-12-05 ENCOUNTER — ANESTHESIA EVENT (INPATIENT)
Dept: LABOR AND DELIVERY | Facility: HOSPITAL | Age: 27
End: 2022-12-05

## 2022-12-05 ENCOUNTER — APPOINTMENT (INPATIENT)
Dept: CT IMAGING | Facility: HOSPITAL | Age: 27
End: 2022-12-05

## 2022-12-05 ENCOUNTER — ANESTHESIA EVENT (INPATIENT)
Dept: PERIOP | Facility: HOSPITAL | Age: 27
End: 2022-12-05

## 2022-12-05 DIAGNOSIS — Z3A.39 39 WEEKS GESTATION OF PREGNANCY: Primary | ICD-10-CM

## 2022-12-05 DIAGNOSIS — Z98.891 S/P REPEAT LOW TRANSVERSE C-SECTION: ICD-10-CM

## 2022-12-05 DIAGNOSIS — O34.219 PREVIOUS CESAREAN SECTION COMPLICATING PREGNANCY: ICD-10-CM

## 2022-12-05 DIAGNOSIS — R00.0 TACHYCARDIA: ICD-10-CM

## 2022-12-05 DIAGNOSIS — O24.419 GESTATIONAL DIABETES: ICD-10-CM

## 2022-12-05 DIAGNOSIS — R55 SYNCOPE: ICD-10-CM

## 2022-12-05 DIAGNOSIS — O34.219 HISTORY OF CESAREAN DELIVERY, ANTEPARTUM: ICD-10-CM

## 2022-12-05 DIAGNOSIS — O99.213 MATERNAL MORBID OBESITY IN THIRD TRIMESTER, ANTEPARTUM (HCC): ICD-10-CM

## 2022-12-05 DIAGNOSIS — E66.01 MATERNAL MORBID OBESITY IN THIRD TRIMESTER, ANTEPARTUM (HCC): ICD-10-CM

## 2022-12-05 PROBLEM — O24.410 DIET CONTROLLED GESTATIONAL DIABETES MELLITUS (GDM) IN THIRD TRIMESTER: Chronic | Status: ACTIVE | Noted: 2022-06-07

## 2022-12-05 PROBLEM — R09.02 HYPOXIA: Status: ACTIVE | Noted: 2022-12-05

## 2022-12-05 LAB
ABO GROUP BLD: NORMAL
ALBUMIN SERPL BCP-MCNC: 2.9 G/DL (ref 3.5–5)
ALP SERPL-CCNC: 141 U/L (ref 34–104)
ALT SERPL W P-5'-P-CCNC: 31 U/L (ref 7–52)
ANION GAP SERPL CALCULATED.3IONS-SCNC: 9 MMOL/L (ref 4–13)
ANION GAP SERPL CALCULATED.3IONS-SCNC: 9 MMOL/L (ref 4–13)
APTT PPP: 26 SECONDS (ref 23–37)
AST SERPL W P-5'-P-CCNC: 19 U/L (ref 13–39)
BASE EXCESS BLDCOV CALC-SCNC: -0.8 MMOL/L (ref 1–9)
BASOPHILS # BLD AUTO: 0.03 THOUSANDS/ÂΜL (ref 0–0.1)
BASOPHILS NFR BLD AUTO: 0 % (ref 0–1)
BILIRUB SERPL-MCNC: 0.34 MG/DL (ref 0.2–1)
BLD GP AB SCN SERPL QL: NEGATIVE
BNP SERPL-MCNC: 39 PG/ML (ref 0–100)
BUN SERPL-MCNC: 10 MG/DL (ref 5–25)
BUN SERPL-MCNC: 9 MG/DL (ref 5–25)
CA-I BLD-SCNC: 1.09 MMOL/L (ref 1.12–1.32)
CALCIUM ALBUM COR SERPL-MCNC: 9.3 MG/DL (ref 8.3–10.1)
CALCIUM SERPL-MCNC: 7.9 MG/DL (ref 8.4–10.2)
CALCIUM SERPL-MCNC: 8.4 MG/DL (ref 8.4–10.2)
CARDIAC TROPONIN I PNL SERPL HS: 4 NG/L
CHLORIDE SERPL-SCNC: 104 MMOL/L (ref 96–108)
CHLORIDE SERPL-SCNC: 105 MMOL/L (ref 96–108)
CO2 SERPL-SCNC: 22 MMOL/L (ref 21–32)
CO2 SERPL-SCNC: 22 MMOL/L (ref 21–32)
CREAT SERPL-MCNC: 0.41 MG/DL (ref 0.6–1.3)
CREAT SERPL-MCNC: 0.42 MG/DL (ref 0.6–1.3)
EOSINOPHIL # BLD AUTO: 0 THOUSAND/ÂΜL (ref 0–0.61)
EOSINOPHIL NFR BLD AUTO: 0 % (ref 0–6)
ERYTHROCYTE [DISTWIDTH] IN BLOOD BY AUTOMATED COUNT: 14 % (ref 11.6–15.1)
ERYTHROCYTE [DISTWIDTH] IN BLOOD BY AUTOMATED COUNT: 14 % (ref 11.6–15.1)
ERYTHROCYTE [DISTWIDTH] IN BLOOD BY AUTOMATED COUNT: 14.1 % (ref 11.6–15.1)
FIBRINOGEN PPP-MCNC: 598 MG/DL (ref 227–495)
GFR SERPL CREATININE-BSD FRML MDRD: 139 ML/MIN/1.73SQ M
GFR SERPL CREATININE-BSD FRML MDRD: 141 ML/MIN/1.73SQ M
GLUCOSE SERPL-MCNC: 136 MG/DL (ref 65–140)
GLUCOSE SERPL-MCNC: 158 MG/DL (ref 65–140)
GLUCOSE SERPL-MCNC: 67 MG/DL (ref 65–140)
HCO3 BLDCOV-SCNC: 24.9 MMOL/L (ref 12.2–28.6)
HCT VFR BLD AUTO: 27 % (ref 34.8–46.1)
HCT VFR BLD AUTO: 32.5 % (ref 34.8–46.1)
HCT VFR BLD AUTO: 34.3 % (ref 34.8–46.1)
HGB BLD-MCNC: 10.6 G/DL (ref 11.5–15.4)
HGB BLD-MCNC: 11.1 G/DL (ref 11.5–15.4)
HGB BLD-MCNC: 8.5 G/DL (ref 11.5–15.4)
IMM GRANULOCYTES # BLD AUTO: 0.1 THOUSAND/UL (ref 0–0.2)
IMM GRANULOCYTES NFR BLD AUTO: 1 % (ref 0–2)
INR PPP: 0.95 (ref 0.84–1.19)
LACTATE SERPL-SCNC: 1.3 MMOL/L (ref 0.5–2)
LYMPHOCYTES # BLD AUTO: 0.94 THOUSANDS/ÂΜL (ref 0.6–4.47)
LYMPHOCYTES NFR BLD AUTO: 6 % (ref 14–44)
MAGNESIUM SERPL-MCNC: 1.4 MG/DL (ref 1.9–2.7)
MCH RBC QN AUTO: 26.3 PG (ref 26.8–34.3)
MCH RBC QN AUTO: 26.7 PG (ref 26.8–34.3)
MCH RBC QN AUTO: 27.3 PG (ref 26.8–34.3)
MCHC RBC AUTO-ENTMCNC: 31.5 G/DL (ref 31.4–37.4)
MCHC RBC AUTO-ENTMCNC: 32.4 G/DL (ref 31.4–37.4)
MCHC RBC AUTO-ENTMCNC: 32.6 G/DL (ref 31.4–37.4)
MCV RBC AUTO: 83 FL (ref 82–98)
MCV RBC AUTO: 84 FL (ref 82–98)
MCV RBC AUTO: 84 FL (ref 82–98)
MONOCYTES # BLD AUTO: 0.57 THOUSAND/ÂΜL (ref 0.17–1.22)
MONOCYTES NFR BLD AUTO: 3 % (ref 4–12)
NEUTROPHILS # BLD AUTO: 14.95 THOUSANDS/ÂΜL (ref 1.85–7.62)
NEUTS SEG NFR BLD AUTO: 90 % (ref 43–75)
NRBC BLD AUTO-RTO: 0 /100 WBCS
OXYHGB MFR BLDCOV: 73 %
PCO2 BLDCOV: 44.5 MM HG (ref 27–43)
PH BLDCOV: 7.37 [PH] (ref 7.19–7.49)
PHOSPHATE SERPL-MCNC: 4.5 MG/DL (ref 2.7–4.5)
PLATELET # BLD AUTO: 264 THOUSANDS/UL (ref 149–390)
PLATELET # BLD AUTO: 277 THOUSANDS/UL (ref 149–390)
PLATELET # BLD AUTO: 289 THOUSANDS/UL (ref 149–390)
PMV BLD AUTO: 11 FL (ref 8.9–12.7)
PMV BLD AUTO: 11.3 FL (ref 8.9–12.7)
PMV BLD AUTO: 11.5 FL (ref 8.9–12.7)
PO2 BLDCOV: 29.6 MM HG (ref 15–45)
POTASSIUM SERPL-SCNC: 3.8 MMOL/L (ref 3.5–5.3)
POTASSIUM SERPL-SCNC: 4.2 MMOL/L (ref 3.5–5.3)
PROT SERPL-MCNC: 5.9 G/DL (ref 6.4–8.4)
PROTHROMBIN TIME: 12.9 SECONDS (ref 11.6–14.5)
RBC # BLD AUTO: 3.23 MILLION/UL (ref 3.81–5.12)
RBC # BLD AUTO: 3.88 MILLION/UL (ref 3.81–5.12)
RBC # BLD AUTO: 4.15 MILLION/UL (ref 3.81–5.12)
RH BLD: POSITIVE
SAO2 % BLDCOV: 15.8 ML/DL
SODIUM SERPL-SCNC: 135 MMOL/L (ref 135–147)
SODIUM SERPL-SCNC: 136 MMOL/L (ref 135–147)
SPECIMEN EXPIRATION DATE: NORMAL
WBC # BLD AUTO: 11.65 THOUSAND/UL (ref 4.31–10.16)
WBC # BLD AUTO: 16.59 THOUSAND/UL (ref 4.31–10.16)
WBC # BLD AUTO: 18.18 THOUSAND/UL (ref 4.31–10.16)

## 2022-12-05 PROCEDURE — 4A1HXCZ MONITORING OF PRODUCTS OF CONCEPTION, CARDIAC RATE, EXTERNAL APPROACH: ICD-10-PCS

## 2022-12-05 PROCEDURE — 0DCW0ZZ EXTIRPATION OF MATTER FROM PERITONEUM, OPEN APPROACH: ICD-10-PCS | Performed by: OBSTETRICS & GYNECOLOGY

## 2022-12-05 RX ORDER — LIDOCAINE HYDROCHLORIDE AND EPINEPHRINE 15; 5 MG/ML; UG/ML
INJECTION, SOLUTION EPIDURAL AS NEEDED
Status: DISCONTINUED | OUTPATIENT
Start: 2022-12-05 | End: 2022-12-05

## 2022-12-05 RX ORDER — EPHEDRINE SULFATE 50 MG/ML
INJECTION INTRAVENOUS AS NEEDED
Status: DISCONTINUED | OUTPATIENT
Start: 2022-12-05 | End: 2022-12-05

## 2022-12-05 RX ORDER — OXYTOCIN/RINGER'S LACTATE 30/500 ML
PLASTIC BAG, INJECTION (ML) INTRAVENOUS CONTINUOUS PRN
Status: DISCONTINUED | OUTPATIENT
Start: 2022-12-05 | End: 2022-12-05

## 2022-12-05 RX ORDER — HYDROMORPHONE HCL/PF 1 MG/ML
0.5 SYRINGE (ML) INJECTION
Status: DISCONTINUED | OUTPATIENT
Start: 2022-12-05 | End: 2022-12-05

## 2022-12-05 RX ORDER — IBUPROFEN 600 MG/1
600 TABLET ORAL EVERY 6 HOURS PRN
Status: DISCONTINUED | OUTPATIENT
Start: 2022-12-06 | End: 2022-12-08 | Stop reason: HOSPADM

## 2022-12-05 RX ORDER — ACETAMINOPHEN 325 MG/1
975 TABLET ORAL EVERY 6 HOURS SCHEDULED
Status: DISCONTINUED | OUTPATIENT
Start: 2022-12-06 | End: 2022-12-05

## 2022-12-05 RX ORDER — CHLOROPROCAINE HYDROCHLORIDE 30 MG/ML
INJECTION, SOLUTION EPIDURAL; INFILTRATION; INTRACAUDAL; PERINEURAL AS NEEDED
Status: DISCONTINUED | OUTPATIENT
Start: 2022-12-05 | End: 2022-12-05

## 2022-12-05 RX ORDER — OXYCODONE HYDROCHLORIDE 5 MG/1
5 TABLET ORAL EVERY 4 HOURS PRN
Status: DISCONTINUED | OUTPATIENT
Start: 2022-12-06 | End: 2022-12-08 | Stop reason: HOSPADM

## 2022-12-05 RX ORDER — MIDAZOLAM HYDROCHLORIDE 2 MG/2ML
INJECTION, SOLUTION INTRAMUSCULAR; INTRAVENOUS AS NEEDED
Status: DISCONTINUED | OUTPATIENT
Start: 2022-12-05 | End: 2022-12-05

## 2022-12-05 RX ORDER — DEXAMETHASONE SODIUM PHOSPHATE 10 MG/ML
INJECTION, SOLUTION INTRAMUSCULAR; INTRAVENOUS AS NEEDED
Status: DISCONTINUED | OUTPATIENT
Start: 2022-12-05 | End: 2022-12-05

## 2022-12-05 RX ORDER — SODIUM CHLORIDE, SODIUM LACTATE, POTASSIUM CHLORIDE, CALCIUM CHLORIDE 600; 310; 30; 20 MG/100ML; MG/100ML; MG/100ML; MG/100ML
125 INJECTION, SOLUTION INTRAVENOUS CONTINUOUS
Status: DISCONTINUED | OUTPATIENT
Start: 2022-12-05 | End: 2022-12-06

## 2022-12-05 RX ORDER — CEFAZOLIN SODIUM 2 G/50ML
2000 SOLUTION INTRAVENOUS EVERY 8 HOURS
Status: CANCELLED | OUTPATIENT
Start: 2022-12-05

## 2022-12-05 RX ORDER — ONDANSETRON 2 MG/ML
INJECTION INTRAMUSCULAR; INTRAVENOUS AS NEEDED
Status: DISCONTINUED | OUTPATIENT
Start: 2022-12-05 | End: 2022-12-05

## 2022-12-05 RX ORDER — OXYCODONE HYDROCHLORIDE 5 MG/1
5 TABLET ORAL EVERY 4 HOURS PRN
Status: DISCONTINUED | OUTPATIENT
Start: 2022-12-06 | End: 2022-12-05

## 2022-12-05 RX ORDER — OXYCODONE HYDROCHLORIDE 10 MG/1
10 TABLET ORAL EVERY 4 HOURS PRN
Status: DISCONTINUED | OUTPATIENT
Start: 2022-12-06 | End: 2022-12-05

## 2022-12-05 RX ORDER — LIDOCAINE HYDROCHLORIDE 10 MG/ML
INJECTION, SOLUTION EPIDURAL; INFILTRATION; INTRACAUDAL; PERINEURAL AS NEEDED
Status: DISCONTINUED | OUTPATIENT
Start: 2022-12-05 | End: 2022-12-05

## 2022-12-05 RX ORDER — CEFAZOLIN SODIUM 2 G/50ML
2000 SOLUTION INTRAVENOUS ONCE
Status: DISCONTINUED | OUTPATIENT
Start: 2022-12-05 | End: 2022-12-05

## 2022-12-05 RX ORDER — MAGNESIUM HYDROXIDE 1200 MG/15ML
LIQUID ORAL AS NEEDED
Status: DISCONTINUED | OUTPATIENT
Start: 2022-12-05 | End: 2022-12-05 | Stop reason: HOSPADM

## 2022-12-05 RX ORDER — ROPIVACAINE HYDROCHLORIDE 5 MG/ML
INJECTION, SOLUTION EPIDURAL; INFILTRATION; PERINEURAL AS NEEDED
Status: DISCONTINUED | OUTPATIENT
Start: 2022-12-05 | End: 2022-12-05

## 2022-12-05 RX ORDER — PROPOFOL 10 MG/ML
INJECTION, EMULSION INTRAVENOUS AS NEEDED
Status: DISCONTINUED | OUTPATIENT
Start: 2022-12-05 | End: 2022-12-05

## 2022-12-05 RX ORDER — ONDANSETRON 2 MG/ML
4 INJECTION INTRAMUSCULAR; INTRAVENOUS EVERY 8 HOURS PRN
Status: DISCONTINUED | OUTPATIENT
Start: 2022-12-05 | End: 2022-12-08 | Stop reason: HOSPADM

## 2022-12-05 RX ORDER — CEFAZOLIN SODIUM 1 G/50ML
1000 SOLUTION INTRAVENOUS EVERY 8 HOURS
Status: CANCELLED | OUTPATIENT
Start: 2022-12-05

## 2022-12-05 RX ORDER — CALCIUM CARBONATE 200(500)MG
1000 TABLET,CHEWABLE ORAL DAILY PRN
Status: DISCONTINUED | OUTPATIENT
Start: 2022-12-05 | End: 2022-12-05

## 2022-12-05 RX ORDER — CEFAZOLIN SODIUM 1 G/50ML
1000 SOLUTION INTRAVENOUS ONCE
Status: DISCONTINUED | OUTPATIENT
Start: 2022-12-05 | End: 2022-12-05

## 2022-12-05 RX ORDER — FENTANYL CITRATE/PF 50 MCG/ML
25 SYRINGE (ML) INJECTION
Status: DISCONTINUED | OUTPATIENT
Start: 2022-12-05 | End: 2022-12-05

## 2022-12-05 RX ORDER — HYDROMORPHONE HCL/PF 1 MG/ML
0.5 SYRINGE (ML) INJECTION EVERY 2 HOUR PRN
Status: DISCONTINUED | OUTPATIENT
Start: 2022-12-05 | End: 2022-12-08 | Stop reason: HOSPADM

## 2022-12-05 RX ORDER — KETOROLAC TROMETHAMINE 30 MG/ML
30 INJECTION, SOLUTION INTRAMUSCULAR; INTRAVENOUS EVERY 6 HOURS PRN
Status: ACTIVE | OUTPATIENT
Start: 2022-12-05 | End: 2022-12-06

## 2022-12-05 RX ORDER — ROCURONIUM BROMIDE 10 MG/ML
INJECTION, SOLUTION INTRAVENOUS AS NEEDED
Status: DISCONTINUED | OUTPATIENT
Start: 2022-12-05 | End: 2022-12-05

## 2022-12-05 RX ORDER — SUCCINYLCHOLINE/SOD CL,ISO/PF 100 MG/5ML
SYRINGE (ML) INTRAVENOUS AS NEEDED
Status: DISCONTINUED | OUTPATIENT
Start: 2022-12-05 | End: 2022-12-05

## 2022-12-05 RX ORDER — CEFAZOLIN SODIUM 1 G/3ML
INJECTION, POWDER, FOR SOLUTION INTRAMUSCULAR; INTRAVENOUS AS NEEDED
Status: DISCONTINUED | OUTPATIENT
Start: 2022-12-05 | End: 2022-12-05

## 2022-12-05 RX ORDER — NALOXONE HYDROCHLORIDE 0.4 MG/ML
0.1 INJECTION, SOLUTION INTRAMUSCULAR; INTRAVENOUS; SUBCUTANEOUS
Status: ACTIVE | OUTPATIENT
Start: 2022-12-05 | End: 2022-12-06

## 2022-12-05 RX ORDER — NALBUPHINE HCL 10 MG/ML
5 AMPUL (ML) INJECTION
Status: DISPENSED | OUTPATIENT
Start: 2022-12-05 | End: 2022-12-06

## 2022-12-05 RX ORDER — DOCUSATE SODIUM 100 MG/1
100 CAPSULE, LIQUID FILLED ORAL 2 TIMES DAILY
Status: DISCONTINUED | OUTPATIENT
Start: 2022-12-05 | End: 2022-12-08 | Stop reason: HOSPADM

## 2022-12-05 RX ORDER — ENOXAPARIN SODIUM 100 MG/ML
60 INJECTION SUBCUTANEOUS EVERY 12 HOURS SCHEDULED
Status: DISCONTINUED | OUTPATIENT
Start: 2022-12-06 | End: 2022-12-06

## 2022-12-05 RX ORDER — OXYTOCIN/RINGER'S LACTATE 30/500 ML
62.5 PLASTIC BAG, INJECTION (ML) INTRAVENOUS ONCE
Status: COMPLETED | OUTPATIENT
Start: 2022-12-05 | End: 2022-12-05

## 2022-12-05 RX ORDER — MORPHINE SULFATE 1 MG/ML
INJECTION, SOLUTION EPIDURAL; INTRATHECAL; INTRAVENOUS AS NEEDED
Status: DISCONTINUED | OUTPATIENT
Start: 2022-12-05 | End: 2022-12-05

## 2022-12-05 RX ORDER — ONDANSETRON 2 MG/ML
4 INJECTION INTRAMUSCULAR; INTRAVENOUS ONCE AS NEEDED
Status: DISCONTINUED | OUTPATIENT
Start: 2022-12-05 | End: 2022-12-05

## 2022-12-05 RX ORDER — ACETAMINOPHEN 325 MG/1
650 TABLET ORAL EVERY 6 HOURS SCHEDULED
Status: DISPENSED | OUTPATIENT
Start: 2022-12-05 | End: 2022-12-06

## 2022-12-05 RX ORDER — DIPHENHYDRAMINE HCL 25 MG
25 TABLET ORAL EVERY 6 HOURS PRN
Status: DISCONTINUED | OUTPATIENT
Start: 2022-12-05 | End: 2022-12-05

## 2022-12-05 RX ORDER — KETOROLAC TROMETHAMINE 30 MG/ML
INJECTION, SOLUTION INTRAMUSCULAR; INTRAVENOUS AS NEEDED
Status: DISCONTINUED | OUTPATIENT
Start: 2022-12-05 | End: 2022-12-05

## 2022-12-05 RX ORDER — ONDANSETRON 2 MG/ML
4 INJECTION INTRAMUSCULAR; INTRAVENOUS EVERY 8 HOURS PRN
Status: DISCONTINUED | OUTPATIENT
Start: 2022-12-05 | End: 2022-12-05

## 2022-12-05 RX ORDER — SODIUM CHLORIDE, SODIUM LACTATE, POTASSIUM CHLORIDE, CALCIUM CHLORIDE 600; 310; 30; 20 MG/100ML; MG/100ML; MG/100ML; MG/100ML
INJECTION, SOLUTION INTRAVENOUS CONTINUOUS PRN
Status: DISCONTINUED | OUTPATIENT
Start: 2022-12-05 | End: 2022-12-05

## 2022-12-05 RX ORDER — SIMETHICONE 80 MG
80 TABLET,CHEWABLE ORAL 4 TIMES DAILY PRN
Status: DISCONTINUED | OUTPATIENT
Start: 2022-12-05 | End: 2022-12-08 | Stop reason: HOSPADM

## 2022-12-05 RX ORDER — FENTANYL CITRATE 50 UG/ML
INJECTION, SOLUTION INTRAMUSCULAR; INTRAVENOUS AS NEEDED
Status: DISCONTINUED | OUTPATIENT
Start: 2022-12-05 | End: 2022-12-05

## 2022-12-05 RX ORDER — OXYCODONE HYDROCHLORIDE 10 MG/1
10 TABLET ORAL EVERY 4 HOURS PRN
Status: DISCONTINUED | OUTPATIENT
Start: 2022-12-06 | End: 2022-12-08 | Stop reason: HOSPADM

## 2022-12-05 RX ORDER — SODIUM CHLORIDE 9 MG/ML
INJECTION, SOLUTION INTRAVENOUS CONTINUOUS PRN
Status: DISCONTINUED | OUTPATIENT
Start: 2022-12-05 | End: 2022-12-05

## 2022-12-05 RX ADMIN — FENTANYL CITRATE 100 MCG: 50 INJECTION, SOLUTION INTRAMUSCULAR; INTRAVENOUS at 21:18

## 2022-12-05 RX ADMIN — TRANEXAMIC ACID 1000 MG: 1 INJECTION, SOLUTION INTRAVENOUS at 22:05

## 2022-12-05 RX ADMIN — Medication 250 MILLI-UNITS/MIN: at 11:12

## 2022-12-05 RX ADMIN — LIDOCAINE HYDROCHLORIDE AND EPINEPHRINE 5 ML: 15; 5 INJECTION, SOLUTION EPIDURAL at 10:48

## 2022-12-05 RX ADMIN — CHLOROPROCAINE HYDROCHLORIDE 10 ML: 30 INJECTION, SOLUTION EPIDURAL; INFILTRATION at 10:48

## 2022-12-05 RX ADMIN — SODIUM CHLORIDE, SODIUM LACTATE, POTASSIUM CHLORIDE, AND CALCIUM CHLORIDE 125 ML/HR: .6; .31; .03; .02 INJECTION, SOLUTION INTRAVENOUS at 13:20

## 2022-12-05 RX ADMIN — Medication 62.5 MILLI-UNITS/MIN: at 13:20

## 2022-12-05 RX ADMIN — ALBUMIN HUMAN 250 ML: 0.05 SOLUTION INTRAVENOUS at 21:34

## 2022-12-05 RX ADMIN — SODIUM CHLORIDE, SODIUM LACTATE, POTASSIUM CHLORIDE, AND CALCIUM CHLORIDE: .6; .31; .03; .02 INJECTION, SOLUTION INTRAVENOUS at 09:49

## 2022-12-05 RX ADMIN — DEXAMETHASONE SODIUM PHOSPHATE 10 MG: 10 INJECTION, SOLUTION INTRAMUSCULAR; INTRAVENOUS at 21:24

## 2022-12-05 RX ADMIN — ROPIVACAINE HYDROCHLORIDE 5 ML: 5 INJECTION, SOLUTION EPIDURAL; INFILTRATION; PERINEURAL at 10:54

## 2022-12-05 RX ADMIN — ROPIVACAINE HYDROCHLORIDE 5 ML: 5 INJECTION, SOLUTION EPIDURAL; INFILTRATION; PERINEURAL at 10:56

## 2022-12-05 RX ADMIN — ROCURONIUM BROMIDE 50 MG: 10 SOLUTION INTRAVENOUS at 21:24

## 2022-12-05 RX ADMIN — ACETAMINOPHEN 650 MG: 325 TABLET ORAL at 17:37

## 2022-12-05 RX ADMIN — SODIUM CHLORIDE, SODIUM LACTATE, POTASSIUM CHLORIDE, AND CALCIUM CHLORIDE 125 ML/HR: .6; .31; .03; .02 INJECTION, SOLUTION INTRAVENOUS at 20:46

## 2022-12-05 RX ADMIN — MORPHINE SULFATE 2.5 MG: 1 INJECTION, SOLUTION EPIDURAL; INTRATHECAL; INTRAVENOUS at 11:44

## 2022-12-05 RX ADMIN — ALBUMIN HUMAN 250 ML: 0.05 SOLUTION INTRAVENOUS at 21:55

## 2022-12-05 RX ADMIN — IOHEXOL 100 ML: 350 INJECTION, SOLUTION INTRAVENOUS at 18:40

## 2022-12-05 RX ADMIN — DEXAMETHASONE SODIUM PHOSPHATE 10 MG: 10 INJECTION, SOLUTION INTRAMUSCULAR; INTRAVENOUS at 10:56

## 2022-12-05 RX ADMIN — EPHEDRINE SULFATE 5 MG: 50 INJECTION, SOLUTION INTRAVENOUS at 11:37

## 2022-12-05 RX ADMIN — DOCUSATE SODIUM 100 MG: 100 CAPSULE, LIQUID FILLED ORAL at 17:37

## 2022-12-05 RX ADMIN — SUGAMMADEX 200 MG: 100 INJECTION, SOLUTION INTRAVENOUS at 22:59

## 2022-12-05 RX ADMIN — ONDANSETRON 4 MG: 2 INJECTION INTRAMUSCULAR; INTRAVENOUS at 21:58

## 2022-12-05 RX ADMIN — ALBUMIN HUMAN 250 ML: 0.05 SOLUTION INTRAVENOUS at 21:22

## 2022-12-05 RX ADMIN — KETOROLAC TROMETHAMINE 30 MG: 30 INJECTION, SOLUTION INTRAMUSCULAR at 11:50

## 2022-12-05 RX ADMIN — PHENYLEPHRINE HYDROCHLORIDE 50 MCG/MIN: 50 INJECTION INTRAVENOUS at 10:48

## 2022-12-05 RX ADMIN — EPHEDRINE SULFATE 50 MG: 50 INJECTION INTRAVENOUS at 11:37

## 2022-12-05 RX ADMIN — LIDOCAINE HYDROCHLORIDE 50 MG: 10 INJECTION, SOLUTION EPIDURAL; INFILTRATION; INTRACAUDAL at 21:18

## 2022-12-05 RX ADMIN — SODIUM CHLORIDE: 0.9 INJECTION, SOLUTION INTRAVENOUS at 21:24

## 2022-12-05 RX ADMIN — Medication 200 MG: at 21:18

## 2022-12-05 RX ADMIN — ROPIVACAINE HYDROCHLORIDE 10 ML: 5 INJECTION, SOLUTION EPIDURAL; INFILTRATION; PERINEURAL at 10:51

## 2022-12-05 RX ADMIN — EPHEDRINE SULFATE 5 MG: 50 INJECTION, SOLUTION INTRAVENOUS at 11:05

## 2022-12-05 RX ADMIN — MIDAZOLAM HYDROCHLORIDE 2 MG: 1 INJECTION, SOLUTION INTRAMUSCULAR; INTRAVENOUS at 21:07

## 2022-12-05 RX ADMIN — Medication 3000 MG: at 21:24

## 2022-12-05 RX ADMIN — EPHEDRINE SULFATE 5 MG: 50 INJECTION, SOLUTION INTRAVENOUS at 10:54

## 2022-12-05 RX ADMIN — ONDANSETRON 4 MG: 2 INJECTION INTRAMUSCULAR; INTRAVENOUS at 10:56

## 2022-12-05 RX ADMIN — SODIUM CHLORIDE, SODIUM LACTATE, POTASSIUM CHLORIDE, AND CALCIUM CHLORIDE 1000 ML: 600; 310; 30; 20 INJECTION, SOLUTION INTRAVENOUS at 08:44

## 2022-12-05 RX ADMIN — PROPOFOL 200 MG: 10 INJECTION, EMULSION INTRAVENOUS at 21:18

## 2022-12-05 RX ADMIN — CEFAZOLIN SODIUM 3000 MG: 1 INJECTION, POWDER, FOR SOLUTION INTRAMUSCULAR; INTRAVENOUS at 10:49

## 2022-12-05 RX ADMIN — PHENYLEPHRINE HYDROCHLORIDE 50 MCG/MIN: 50 INJECTION INTRAVENOUS at 21:17

## 2022-12-05 NOTE — ANESTHESIA PROCEDURE NOTES
Epidural Block    Patient location during procedure: OB  Reason for block: procedure for pain and primary anesthetic  Staffing  Performed: Anesthesiologist and CRNA   Anesthesiologist: Brandon Petty MD  Resident/CRNA: Shila Alexandra CRNA  Preanesthetic Checklist  Completed: patient identified, IV checked, site marked, risks and benefits discussed, surgical consent, monitors and equipment checked, pre-op evaluation and timeout performed  Epidural  Patient position: sitting  Prep: ChloraPrep  Patient monitoring: frequent blood pressure checks, continuous pulse ox, cardiac monitor and heart rate  Approach: midline  Location: lumbar  Injection technique: ERNESTO saline  Needle  Needle type: Tuohy   Needle gauge: 18 G  Catheter type: side hole  Catheter size: 20 G  Catheter at skin depth: 14 cm  Catheter securement method: stabilization device  Test dose: negative  Assessment  Sensory level: T4  Number of attempts: 2no paresthesia on injection, negative aspiration for heme and negative aspiration for CSF  patient tolerated the procedure well with no immediate complications

## 2022-12-05 NOTE — DISCHARGE SUMMARY
CS Discharge Summary - Shahab Hooker 28 y o  female MRN: [id number]    Unit/Bed#: LD PACU- Encounter: [id number]    Admission Date: 2022     Discharge Date: 2022    Admitting Diagnosis:   Patient Active Problem List   Diagnosis   • Maternal morbid obesity in third trimester, antepartum ([address] )   • Diet controlled gestational diabetes mellitus (GDM) in third trimester   • 39 weeks gestation of pregnancy   • History of  delivery, antepartum   • Prenatal care in third trimester     Discharge Diagnosis:   Same, delivered  S/p RLTCS    Procedures:   repeat  section, low transverse incision    Admitting Attending: Dr Christiano Alex  Delivery Attending: Dr Christiano Aelx  Discharge Attending: Dr Oralia Aguilar Course:     Shahab Hooker is a 28 y o  [id number] who was admitted for scheduled RLTCS  She then underwent an uncomplicated  section delivery and delivered a viable female  on 22 at 1110  APGARS were 9, 9 at 1 and 5 minutes, respectively   weighed 6lb 15oz  Placenta was delivered at 1116   was then transferred to  nursery  Patient tolerated the procedure well and was transferred to recovery in stable condition  The patient's post partum course was complicated by hemoperitoneum requiring additional surgical management requiring ICU admission  Patient received a total of 2 units packed red blood cells with eventual stabilization of hemoglobin  She was transferred back to the postpartum floor on PPD#1  On day of discharge, she was ambulating and able to reasonably perform all ADLs  She was voiding and had appropriate bowel function  Pain was well controlled  She was discharged home on post-operative day #3 without complications  Patient was instructed to follow up with her OB as an outpatient and was given appropriate warnings to call provider if she develops signs of infection or uncontrolled pain       Complications:   None    Condition at discharge:   good     Provisions for Follow-Up Care:  See after visit summary for information related to follow-up care and any pertinent home health orders  Disposition:   Home    Planned Readmission:   No    Discharge Medications:   Prenatal vitamin daily for 6 months or the duration of nursing whichever is longer  Motrin 600 mg orally every 6 hours as needed for pain  Tylenol (over the counter) per bottle directions as needed for pain  Hydrocortisone cream 1% (over the counter) applied 1-2x daily to hemorrhoids as needed  Witch hazel pads for hemorrhoidal discomfort as needed    Discharge instructions :   -Do not place anything (no partner, tampons or douche) in your vagina for 6 weeks  -You may walk for exercise for the first 6 weeks then gradually return to your usual activities    -Please do not drive for 1 week if you have no stitches and for 2 weeks if you have stitches or underwent a  delivery     -You may take baths or shower per your preference    -Please look at your bust (breasts) in the mirror daily and call provider for redness or tenderness or increased warmth  - If you have had a  please look at your incision daily as well and call provider for increasing redness or steady drainage from the incision    -Please call your provider if temperature > 100 4*F or 38* C, worsening pain or a foul discharge

## 2022-12-05 NOTE — PLAN OF CARE
Problem: PAIN - ADULT  Goal: Verbalizes/displays adequate comfort level or baseline comfort level  Description: Interventions:  - Encourage patient to monitor pain and request assistance  - Assess pain using appropriate pain scale  - Administer analgesics based on type and severity of pain and evaluate response  - Implement non-pharmacological measures as appropriate and evaluate response  - Consider cultural and social influences on pain and pain management  - Notify physician/advanced practitioner if interventions unsuccessful or patient reports new pain  Outcome: Progressing     Problem: INFECTION - ADULT  Goal: Absence or prevention of progression during hospitalization  Description: INTERVENTIONS:  - Assess and monitor for signs and symptoms of infection  - Monitor lab/diagnostic results  - Monitor all insertion sites, i e  indwelling lines, tubes, and drains  - Monitor endotracheal if appropriate and nasal secretions for changes in amount and color  - [place] appropriate cooling/warming therapies per order  - Administer medications as ordered  - Instruct and encourage patient and family to use good hand hygiene technique  - Identify and instruct in appropriate isolation precautions for identified infection/condition  Outcome: Progressing  Goal: Absence of fever/infection during neutropenic period  Description: INTERVENTIONS:  - Monitor WBC    Outcome: Progressing     Problem: SAFETY ADULT  Goal: Patient will remain free of falls  Description: INTERVENTIONS:  - Educate patient/family on patient safety including physical limitations  - Instruct patient to call for assistance with activity   - Consult OT/PT to assist with strengthening/mobility   - Keep Call bell within reach  - Keep bed low and locked with side rails adjusted as appropriate  - Keep care items and personal belongings within reach  - Initiate and maintain comfort rounds    Outcome: Progressing  Goal: Maintain or return to baseline ADL function  Description: INTERVENTIONS:  -  Assess patient's ability to carry out ADLs; assess patient's baseline for ADL function and identify physical deficits which impact ability to perform ADLs (bathing, care of mouth/teeth, toileting, grooming, dressing, etc )  - Assess/evaluate cause of self-care deficits   - Assess range of motion  - Assess patient's mobility; develop plan if impaired  - Assess patient's need for assistive devices and provide as appropriate  - Encourage maximum independence but intervene and supervise when necessary  - Involve family in performance of ADLs  - Assess for home care needs following discharge   - Consider OT consult to assist with ADL evaluation and planning for discharge  - Provide patient education as appropriate  Outcome: Progressing  Goal: Maintains/Returns to pre admission functional level  Description: INTERVENTIONS:  - Perform BMAT or MOVE assessment daily    - Set and communicate daily mobility goal to care team and patient/family/caregiver     - Collaborate with rehabilitation services on mobility goals if consulted  Outcome: Progressing     Problem: DISCHARGE PLANNING  Goal: Discharge to home or other facility with appropriate resources  Description: INTERVENTIONS:  - Identify barriers to discharge w/patient and caregiver  - Arrange for needed discharge resources and transportation as appropriate  - Identify discharge learning needs (meds, wound care, etc )  - Arrange for interpretive services to assist at discharge as needed  - Refer to Case Management Department for coordinating discharge planning if the patient needs post-hospital services based on physician/advanced practitioner order or complex needs related to functional status, cognitive ability, or social support system  Outcome: Progressing     Problem: BIRTH - VAGINAL/ SECTION  Goal: Fetal and maternal status remain reassuring during the birth process  Description: INTERVENTIONS:  - Monitor vital signs  - Monitor fetal heart rate  - Monitor uterine activity  - Monitor labor progression (vaginal delivery)  - DVT prophylaxis  - Antibiotic prophylaxis  Outcome: Progressing  Goal: Emotionally satisfying birthing experience for mother/fetus  Description: Interventions:  - Assess, plan, implement and evaluate the nursing care given to the patient in labor  - Advocate the philosophy that each childbirth experience is a unique experience and support the family's chosen level of involvement and control during the labor process   - Actively participate in both the patient's and family's teaching of the birth process  - Consider cultural, [place]ism and age-specific factors and plan care for the patient in labor  Outcome: Progressing     Problem: Knowledge Deficit  Goal: Patient/family/caregiver demonstrates understanding of disease process, treatment plan, medications, and discharge instructions  Description: Complete learning assessment and assess knowledge base    Interventions:  - Provide teaching at level of understanding  - Provide teaching via preferred learning methods  Outcome: Progressing

## 2022-12-05 NOTE — ASSESSMENT & PLAN NOTE
Lab Results   Component Value Date    HGBA1C 5 0 09/23/2022       Recent Labs     12/05/22  1012   POCGLU 67       Blood Sugar Average: Last 72 hrs:  (P) 67     Will need glucose testing postpartum

## 2022-12-05 NOTE — PLAN OF CARE
Problem: PAIN - ADULT  Goal: Verbalizes/displays adequate comfort level or baseline comfort level  Description: Interventions:  - Encourage patient to monitor pain and request assistance  - Assess pain using appropriate pain scale  - Administer analgesics based on type and severity of pain and evaluate response  - Implement non-pharmacological measures as appropriate and evaluate response  - Consider cultural and social influences on pain and pain management  - Notify physician/advanced practitioner if interventions unsuccessful or patient reports new pain  Outcome: Progressing     Problem: INFECTION - ADULT  Goal: Absence or prevention of progression during hospitalization  Description: INTERVENTIONS:  - Assess and monitor for signs and symptoms of infection  - Monitor lab/diagnostic results  - Monitor all insertion sites, i e  indwelling lines, tubes, and drains  - Monitor endotracheal if appropriate and nasal secretions for changes in amount and color  - [place] appropriate cooling/warming therapies per order  - Administer medications as ordered  - Instruct and encourage patient and family to use good hand hygiene technique  - Identify and instruct in appropriate isolation precautions for identified infection/condition  Outcome: Progressing  Goal: Absence of fever/infection during neutropenic period  Description: INTERVENTIONS:  - Monitor WBC    Outcome: Progressing     Problem: SAFETY ADULT  Goal: Patient will remain free of falls  Description: INTERVENTIONS:  - Educate patient/family on patient safety including physical limitations  - Instruct patient to call for assistance with activity   - Consult OT/PT to assist with strengthening/mobility   - Keep Call bell within reach  - Keep bed low and locked with side rails adjusted as appropriate  - Keep care items and personal belongings within reach  - Initiate and maintain comfort rounds    Outcome: Progressing  Goal: Maintain or return to baseline ADL function  Description: INTERVENTIONS:  -  Assess patient's ability to carry out ADLs; assess patient's baseline for ADL function and identify physical deficits which impact ability to perform ADLs (bathing, care of mouth/teeth, toileting, grooming, dressing, etc )  - Assess/evaluate cause of self-care deficits   - Assess range of motion  - Assess patient's mobility; develop plan if impaired  - Assess patient's need for assistive devices and provide as appropriate  - Encourage maximum independence but intervene and supervise when necessary  - Involve family in performance of ADLs  - Assess for home care needs following discharge   - Consider OT consult to assist with ADL evaluation and planning for discharge  - Provide patient education as appropriate  Outcome: Progressing  Goal: Maintains/Returns to pre admission functional level  Description: INTERVENTIONS:  - Perform BMAT or MOVE assessment daily    - Set and communicate daily mobility goal to care team and patient/family/caregiver     - Collaborate with rehabilitation services on mobility goals if consulted  Outcome: Progressing     Problem: DISCHARGE PLANNING  Goal: Discharge to home or other facility with appropriate resources  Description: INTERVENTIONS:  - Identify barriers to discharge w/patient and caregiver  - Arrange for needed discharge resources and transportation as appropriate  - Identify discharge learning needs (meds, wound care, etc )  - Arrange for interpretive services to assist at discharge as needed  - Refer to Case Management Department for coordinating discharge planning if the patient needs post-hospital services based on physician/advanced practitioner order or complex needs related to functional status, cognitive ability, or social support system  Outcome: Progressing     Problem: Knowledge Deficit  Goal: Patient/family/caregiver demonstrates understanding of disease process, treatment plan, medications, and discharge instructions  Description: Complete learning assessment and assess knowledge base    Interventions:  - Provide teaching at level of understanding  - Provide teaching via preferred learning methods  Outcome: Progressing     Problem: POSTPARTUM  Goal: Experiences normal postpartum course  Description: INTERVENTIONS:  - Monitor maternal vital signs  - Assess uterine involution and lochia  Outcome: Progressing  Goal: Appropriate maternal -  bonding  Description: INTERVENTIONS:  - Identify family support  - Assess for appropriate maternal/infant bonding   -Encourage maternal/infant bonding opportunities  - Referral to  or  as needed  Outcome: Progressing  Goal: Establishment of infant feeding pattern  Description: INTERVENTIONS:  - Assess breast/bottle feeding  - Refer to lactation as needed  Outcome: Progressing  Goal: Incision(s), wounds(s) or drain site(s) healing without S/S of infection  Description: INTERVENTIONS  - Assess and document dressing, incision, wound bed, drain sites and surrounding tissue  - Provide patient and family education  - Perform skin care/dressing changes every   Outcome: Progressing

## 2022-12-05 NOTE — OP NOTE
Section Procedure Note    Procedure:   Repeat low transverse  section    Indications:   Maternal request for repeat  section  A1GDM  Maternal morbid obesity    Pre-operative Diagnosis:   Patient Active Problem List   Diagnosis   • Maternal morbid obesity in third trimester, antepartum ([address] )   • Diet controlled gestational diabetes mellitus (GDM) in third trimester   • 39 weeks gestation of pregnancy   • History of  delivery, antepartum   • Prenatal care in third trimester     Post-operative Diagnosis:   S/p repeat low transverse  section  A1GDM  Maternal morbid obesity     Attending: Mariel Farfan MD  Resident: Mahsa Feliz MD    Maternal Findings:  Normal uterus  Normal tubes and ovaries bilaterally  No adhesions  No difficulty noted from skin to delivery     Findings:  Viable female weighing 6lbs 15oz; Apgar scores of 9 at one minute and 9 at five minutes  Clear amniotic fluid  Normal placenta with 3-vessel cord    Arterial and Venous Gases:  Umbilical Cord Venous Blood Gas:  Results from last 7 days   Lab Units 22  1112   PH COV  7 365   PCO2 COV mm HG 44 5*   HCO3 COV mmol/L 24 9   BASE EXC COV mmol/L -0 8*   O2 CT CD VB mL/dL 15 8   O2 HGB, VENOUS CORD % 90 1     Umbilical Cord Arterial Blood Gas:    Unable to process    Specimens: Arterial and venous cord gases, cord blood, segment of umbilical cord, placenta to storage    Quantitative Blood Loss: 901 mL    Drains: Deras catheter           Complications:  None; patient tolerated the procedure well  Disposition: PACU            Condition: stable    Brief Labor Course:   Patient was admitted for scheduled RLTCS  Procedure Details   The patient was seen prior to the procedure  Risks, benefits, possible complications, alternate treatment options, and expected outcomes were discussed with the patient  The patient agreed with the proposed plan and gave informed consent for a RLTCS  The patient was taken to the [facility] Operating Room where she received spinal anesthesia  For infection prophylaxis, she received Ancef 3g preoperatively  Fetal heart tones in the OR were assessed and noted to be within normal limits and a Deras catheter and SCDs were placed  The abdomen was prepped with Chloraprep, the vagina was prepped with chorahexidine, and following appropriate drying time, the patient was draped in the usual sterile manner  A Time Out was held and the above information confirmed  The patient was identified as Isidro Marcial and the procedure verified as a  Delivery  A Pfannenstiel incision was made and carried down through the underlying subcutaneous tissue to the fascia using a scalpel  The rectus fascia was then nicked in the midline and dissected laterally using Chavira scissors  The superior edge of the  fascial incision was grasped with Kocher clamps bilaterally, tented upward and the underlying rectus muscles were dissected off sharply with Chavira scissors  This was repeated on the inferior edge of the fascia and dissected down to the pubic rami  The rectus muscles were  and the peritoneum was identified, entered, and extended longitudinally with blunt dissection  The [place] was inserted  A low transverse uterine incision was made with the scalpel and extended laterally with blunt dissection  The amnion was entered bluntly  The fetal head was palpated, elevated, and delivered through the uterine incision followed by the body without difficulty  There was noted to be spontaneous cry and good tone  There was no apparent injury to the   The umbilical cord was doubly clamped and cut after 30 seconds to allow for delayed cord clamping  The infant was handed off to the  providers  Arterial and venous cord gases, cord blood, and a segment of umbilical cord were obtained for evaluation    The placenta delivered spontaneously with uterine fundal massage and appeared normal  The uterus was exteriorized and cleaned out with a moist lap sponge  Trailing membranes were noted and required blunt curettage with a lap sponge  The uterine incision was closed with a running locked suture of 0 Vicryl  A second layer of the same suture was used to imbricate the first   Hemostasis was noted to be excellent  The uterus was returned to the abdomen  The Narciso retractor was removed  The paracolic gutters were inspected and cleared of all clots and debris  The fascia was closed with a running suture of 0 Vicryl  Subcutaneous adipose tissue was closed with a running suture of 2-0 Plain gut  The skin was closed with a subcuticular running suture of 4-0 Monocryl  Exofin was applied  The patient appeared to tolerate the procedure very well  Lap sponge, needle, and instrument counts were correct x2  The patient was transferred to her postpartum recovery room in stable condition and her infant went to the  nursery  Magdy Nathan MD  OB/GYN  2022  12:18 PM

## 2022-12-05 NOTE — LACTATION NOTE
This note was copied from a baby's chart  CONSULT - LACTATION  Baby Girl Minoo Foil) [employer] 0 days female MRN: [id number]    [facility] NURSERY Room / Bed: (N)/(N) Encounter: [id number]    Maternal Information     MOTHER:  Freddy Sheldon  Maternal Age: 28 y o    OB History: # 1 - Date: 14, Sex: Female, Weight: 3600 g (7 lb 15 oz), GA: 40w0d, Delivery: , Unspecified, Apgar1: None, Apgar5: None, Living: Living, Birth Comments: None    # 2 - Date: 22, Sex: Female, Weight: 3155 g (6 lb 15 3 oz), GA: 39w1d, Delivery: , Low Transverse, Apgar1: 9, Apgar5: 9, Living: Living, Birth Comments: None   Previouse breast reduction surgery? No    Lactation history:   Has patient previously breast fed: Yes   How long had patient previously breast fed: a few weeks   Previous breast feeding complications: Other (Comment) (maternal anatomy)     Past Surgical History:   Procedure Laterality Date   •  SECTION, LOW TRANSVERSE  2014    LAST ASSESSED: 1/8/15        Birth information:  YOB: 2023   Time of birth: 11:10 AM   Sex: female   Delivery type: , Low Transverse   Birth Weight: 3155 g (6 lb 15 3 oz)   Percent of Weight Change: 0%     Gestational Age: 36w3d   [unfilled]    Assessment     Breast and nipple assessment: flat nipple     Assessment: normal assessment    Feeding assessment: latch difficulty (due to positioning and flat nipples that elizabeth with stimulation)  LATCH:  Latch: Repeated attempts, hold nipple in mouth, stimulate to suck   Audible Swallowing: A few with stimulation   Type of Nipple: Everted (After stimulation)   Comfort (Breast/Nipple): Soft/non-tender   Hold (Positioning): Partial assist, teach one side, mother does other, staff holds   LATCH Score: 7          Feeding recommendations:  breast feed on demand   Education on positoning at the breast  Provided a manual pump and latch assist with elongating the nipple  During feeding attempt mom's heart rate began to spike  Mom was taken to CT scan  After education, mom decided on artificial supplementation via syringe  FOB provided teach-back of syringe feeding after demonstration  Switch feeds demonstration     Set up pump  21 mm flanges provided    Mom has pamphlet and education to choose pump    RSB/DC reviewed     Enc  To call lactation    Education on positioning and alignment  Mom is encouraged to:     - Bring baby up to the breast (use of pillows to elevate so baby's torso is against mom's breasts)   - Skin to skin for feedings with top hand exposed to show signs of satiation   - Chin deep into breast tissue (make baby look up to the nipple)   - nose aligned to the nipple   -Wait for wide gape, drag chin on the breast so nipple is aimed at the upper, back palate  - Cheek should be touching breast   - Deep, firm hold of baby with ear, shoulder, hip alignment    Switch Feeds:   Start every feeding at the breast  Offer both breasts or one breast and use breast compressions to achieve active suckling  Once baby is not actively suckling, bring baby in upright position and offer expressed milk and/or artificial supplementation via alternative feeding method (syringe, finger, paced bottle feeding)  Burp frequently between breasts and during paced bottle feeding  Once feed is complete, place baby back on breast for on-nutritive suck  Pump after the feeding session to supplement with expressed milk at next feed  Pumping:   - When pumping, begin in stimulation mode (high cycle, low vacuum) until milk begins to express  Change pump to expression mode (low cycle, high vacuum)  Use hands on pumping techniques to assist with milk transfer  When milk stops expressing, change back to stimulation mode  When milk begins to flow, change to expression mode  You make cycle pump up to three times in a pumping session  Mom's nipple everts with stimulation   With nipple compression, short shank noted  Education on ways to elongate the nipple: Hand expression, Latch assist, breast shells, supple cups, manual and electric pump stimulation  Information on hand expression given  Discussed benefits of knowing how to manually express breast including stimulating milk supply, softening nipple for latch and evacuating breast in the event of engorgement  Mom is encouraged to place baby skin to skin for feedings  Skin to skin education provided for baby placement on mother's chest, baby only in diaper, blankets below shoulders on baby's back  Skin to skin is encouraged to continue at home for feedings and between feedings  Worked on positioning infant up at chest level and starting to feed infant with nose arriving at the nipple  Then, using areolar compression to achieve a deep latch that is comfortable and exchanges optimum amounts of milk  - Start feedings on breast that last feeding ended   - allow no more than 3 hours between breast feeding sessions   - time between feedings is counted from the beginning of the first feed to the beginning of the next feeding session    Reviewed early signs of hunger, including tensing of hands and shoulders - no need to wait for open eyes  Crying is a late hunger sign  If baby is crying, soothe baby first and then attempt to latch  Reviewed normal sucking patterns: transition from stimulation to nutritive to release or non-nutritive  The goal is to see and hear lots of swallowing  Reviewed normal nursing pattern: infant could latch on one breast up to 30 minutes or until releases on own  Signs of satiation is open hand with fingers that do not grab your finger  Discussed difference in sensation of non-nutritive v nutritive sucking    Met with mother  Provided mother with Ready, Set, Baby booklet  Discussed Skin to Skin contact an benefits to mom and baby  Talked about the delay of the first bath until baby has adjusted   Spoke about the benefits of rooming in  Feeding on cue and what that means for recognizing infant's hunger  Avoidance of pacifiers for the first month discussed  Talked about exclusive breastfeeding for the first 6 months  Positioning and latch reviewed as well as showing images of other feeding positions  Discussed the properties of a good latch in any position  Reviewed hand/manual expression  Discussed s/s that baby is getting enough milk and some s/s that breastfeeding dyad may need further help  Gave information on common concerns, what to expect the first few weeks after delivery, preparing for other caregivers, and how partners can help  Resources for support also provided  Encouraged parents to call for assistance, questions, and concerns about breastfeeding  Extension provided  Provided education on growth spurts, when to introduce bottles; paced bottle feeding, and non-nutritive suck at the breast  Provided education on Signs of satiation  Encouraged to call lactation to observe a latch prior to discharge for reassurance  Encouraged to call baby and me with any questions and closely monitor output      [place], [place] 12/5/2022 5:26 PM

## 2022-12-05 NOTE — DISCHARGE INSTRUCTIONS
WHAT YOU NEED TO KNOW:   A , or  section, is abdominal surgery to deliver your baby  DISCHARGE INSTRUCTIONS:   Call 911 for any of the following: You feel lightheaded, short of breath, and have chest pain  You cough up blood  Seek care immediately if:   Blood soaks through your bandage  Your stitches come apart  Your arm or leg feels warm, tender, and painful  It may look swollen and red  Contact your OB if:   You have heavy vaginal bleeding that fills 1 or more sanitary pads in 1 hour  You have a fever  Your incision is swollen, red, or draining pus  You have questions or concerns about yourself or your baby  Medicines: You may  need any of the following:  Prescription pain medicine  may be given  Ask how to take this medicine safely  Acetaminophen  decreases pain and fever  It is available without a doctor's order  Ask how much to take and how often to take it  Follow directions  Acetaminophen can cause liver damage if not taken correctly  NSAIDs , such as ibuprofen, help decrease swelling, pain, and fever  NSAIDs can cause stomach bleeding or kidney problems in certain people  If you take blood thinner medicine, always ask your healthcare provider if NSAIDs are safe for you  Always read the medicine label and follow directions  Take your medicine as directed  Contact your healthcare provider if you think your medicine is not helping or if you have side effects  Tell him or her if you are allergic to any medicine  Keep a list of the medicines, vitamins, and herbs you take  Include the amounts, and when and why you take them  Bring the list or the pill bottles to follow-up visits  Carry your medicine list with you in case of an emergency  Wound care:  Carefully wash your wound with soap and water every day  Keep your wound clean and dry  Wear loose, comfortable clothes that do not rub against your wound   Ask your OB about bathing and showering  Limit activity as directed: Ask when it is safe for you to drive, walk up stairs, lift heavy objects, and have sex  Ask when it is okay to exercise, and what types of exercise to do  Start slowly and do more as you get stronger  Drink liquids as directed:  Liquids help keep you hydrated after your procedure and decrease your risk for a blood clot  Ask how much liquid to drink each day and which liquids are best for you  Follow up with your OB as directed: You may need to return to have your stitches or staples removed  Write down your questions so you remember to ask them during your visits  © 2017 [employer]  Information is for End User's use only and may not be sold, redistributed or otherwise used for commercial purposes  All illustrations and images included in CareNotes® are the copyrighted property of A D A M , Inc  or Pato Riggs  The above information is an  only  It is not intended as medical advice for individual conditions or treatments  Talk to your doctor, nurse or pharmacist before following any medical regimen to see if it is safe and effective for you  Postpartum Perineal Care   WHAT YOU NEED TO KNOW:   Postpartum perineal care is care for your perineum after you have a baby  The perineum is your vagina and anus  DISCHARGE INSTRUCTIONS:   Care for your perineum:  Healthcare providers will give you a small squirt bottle and show you how to use it  Do the following after you use the toilet and before you put on a new pad:  Remove the soiled pad    Use the squirt bottle to rinse your perineum from front to back while you sit on the toilet     Pat the area dry from front to back with toilet paper or a cotton cloth     Put on a fresh pad    Wash your hands  Decrease pain:  Ask your healthcare provider about these and other ways to decrease perineal pain:  Sitz baths:  Healthcare providers may give you a portable sitz bath   This is a small tub that fits in the toilet  Fill the sitz bath or bathtub with 4 to 6 inches of warm water  Sit in the warm water for 20 minutes 2 to 3 times a day  Ice:  Ice helps decrease swelling and pain  Ice may also help prevent tissue damage  Use an ice pack, or put crushed ice in a plastic bag  Cover it with a towel and place it on your perineum for 15 to 20 minutes every hour, or as directed  Medicine spray, wipes, or pads:  Healthcare providers may give you a medicine spray or wipes soaked with numbing medicine to decrease the pain  Pads that contain an herb called witch hazel may also help reduce pain  Use these after perineal care or a sitz bath  Follow up with your healthcare provider as directed:  Write down your questions so you remember to ask them during your visits  Contact your healthcare provider if:   You have heavy vaginal bleeding that fills 1 or more sanitary pads in 1 hour  You have foul-smelling vaginal discharge  You feel weak or lightheaded  You have questions or concerns about your condition or care  Seek care immediately or call 911 if:   You have large blood clots or bright red blood coming from your vagina  You have abdominal pain, vomiting, and a fever  © 2017 [employer]  Information is for End User's use only and may not be sold, redistributed or otherwise used for commercial purposes  All illustrations and images included in CareNotes® are the copyrighted property of A [employer]  or Pato Riggs  The above information is an  only  It is not intended as medical advice for individual conditions or treatments  Talk to your doctor, nurse or pharmacist before following any medical regimen to see if it is safe and effective for you  Postpartum Depression   WHAT YOU NEED TO KNOW:   What is postpartum depression? Postpartum depression is a mood disorder that occurs after giving birth  A mood is an emotion or a feeling   Moods affect your behavior and how you feel about yourself and life in general  Depression is a sad mood that you cannot control  Women often feel sad, afraid, or nervous after their baby is born  These feelings are called postpartum blues or baby blues, and they usually go away in 1 to 2 weeks  With postpartum depression, these symptoms get worse and continue for more than 2 weeks  Postpartum depression is a serious condition that affects your daily activities and relationships  What causes postpartum depression? Healthcare providers do not know exactly what causes postpartum depression  It may be caused by a sudden drop in hormone levels after childbirth  A previous episode of postpartum depression or a family history of depression may increase your risk  Several things may trigger postpartum depression:  Lack of support from the baby's father or other family members    Feeling more tired than usual    Stress, a poor diet, or lack of sleep    Pain after childbirth or pain during breastfeeding    Sudden change in lifestyle  How is postpartum depression diagnosed? Postpartum depression affects your daily activities and your relationships with other people  Healthcare providers will ask you questions about your signs and symptoms and how they are affecting your life  The symptoms of postpartum depression usually begin within 1 month after childbirth  You feel depressed or lose interest in activities you enjoy nearly every day for at least 2 weeks  You also have 4 or more of the following symptoms: You feel tired or have less energy than usual      You feel unimportant or guilty most of the time  You think about hurting or killing yourself  Your appetite changes  You may lose your appetite and lose weight without trying  Your appetite may also increase and you may gain weight  You are restless, irritable, or withdrawn  You have trouble concentrating and remembering things   You have trouble doing daily tasks or making decisions  You have trouble sleeping, even after the baby is asleep  How is postpartum depression treated? Psychotherapy:  During therapy, you will talk with healthcare providers about how to cope with your feelings and moods  This can be done alone or in a group  It may also be done with family members or your partner  Antidepressants: This medicine is given to decrease or stop the symptoms of depression  You usually need to take antidepressants for several weeks before you begin to feel better  Do not stop taking antidepressants unless your healthcare provider tells you to  Healthcare providers may try a different antidepressant if one type does not work  What can I do to feel better? Rest:  Do not try to do everything all at the same time  Do only what is needed and let other things wait until later  Ask your family or friends for help, especially if you have other children  Ask your partner to help with night feedings or other baby care  Try to sleep when the baby naps  Get emotional support:  Share your feelings with your partner, a friend, or another mother  Take care of yourself:  Shower and dress each day  Do not skip meals  Try to get out of the house a little each day  Get regular exercise  Eat a healthy diet  Avoid alcohol because it can make your depression worse  Do not isolate yourself  Go for a walk or meet with a friend  It is also important that you have some time by yourself each day  How do I find support and more information? [address] [place] [address], Public Information & Communication Branch  [address], [address], [address]  [place] [address]   Phone: [phone]  Phone: [phone]  Web Address: [facility]  When should I contact my healthcare provider? You cannot make it to your next visit  Your depression does not get better with treatment or it gets worse       You have questions or concerns about your condition or care  When should I seek immediate care or call 911? You think about hurting or killing yourself, your baby, or someone else  You feel like other people want to hurt you  You hear voices telling you to hurt yourself or your baby  CARE AGREEMENT:   You have the right to help plan your care  Learn about your health condition and how it may be treated  Discuss treatment options with your caregivers to decide what care you want to receive  You always have the right to refuse treatment  The above information is an  only  It is not intended as medical advice for individual conditions or treatments  Talk to your doctor, nurse or pharmacist before following any medical regimen to see if it is safe and effective for you  ©  2600 [facility] Information is for End User's use only and may not be sold, redistributed or otherwise used for commercial purposes  All illustrations and images included in CareNotes® are the copyrighted property of A D A M , Inc  or Pato Riggs  Postpartum Bleeding   WHAT YOU NEED TO KNOW:   Postpartum bleeding is vaginal bleeding after childbirth  This bleeding is normal, whether your baby was born vaginally or by   It contains blood and the tissue that lined the inside of your uterus when you were pregnant  DISCHARGE INSTRUCTIONS:   What to expect with postpartum bleeding:  Postpartum bleeding usually lasts at least 10 days, and may last longer than 6 weeks  Your bleeding may range from light (barely staining a pad) to heavy (soaking a pad in 1 hour)  Usually, you have heavier bleeding right after childbirth, which slows over the next few weeks until it stops  The bleeding is red or dark brown with clots for the first 1 to 3 days  It then turns pink for several days, and then becomes a white or yellow discharge until it ends    Follow up with your obstetrician as directed:  Do not have sex until your obstetrician says it is okay  Write down your questions so you remember to ask them during your visits  Contact your healthcare provider or obstetrician if:   Your bleeding increases, or you have heavy bleeding that soaks a pad in 1 hour for 2 hours in a row  You pass large blood clots  You are breathing faster than normal, or your heart is beating faster than normal     You are urinating less than usual, or not at all  You feel dizzy  You have questions or concerns about your condition or care  Seek immediate care or call 911 if:   You are suddenly short of breath and feel lightheaded  You have sudden chest pain  © 2017 [address] [employer]  Information is for End User's use only and may not be sold, redistributed or otherwise used for commercial purposes  All illustrations and images included in CareNotes® are the copyrighted property of A D A M [employer] Inc  or Pato Riggs  The above information is an  only  It is not intended as medical advice for individual conditions or treatments  Talk to your doctor, nurse or pharmacist before following any medical regimen to see if it is safe and effective for you  Breast Care for the Breast Feeding Mother   WHAT YOU SHOULD KNOW:   Your breasts will go through normal changes while you are breastfeeding  Sometimes breast and nipple problems can develop while you are breastfeeding  Learn about changes that are normal and those that may be a problem  Breast care can help you prevent and manage problems so you and your baby can enjoy the benefits of breastfeeding  AFTER YOU LEAVE:   Breast changes while you are breastfeeding: For the first few days after your baby is born, your body makes a small amount of breast milk (colostrum)  Within about 2 to 5 days, your body will begin making mature milk  It may take up to 10 days or longer for mature milk to come in   When your mature milk comes in, your breasts will become full and firm  They may feel tender  Breastfeeding your baby will decrease the full feeling in your breasts  You may feel a tingly sensation during feedings as milk is released from your breasts  This is called the milk let-down reflex  After 7 or more days, the fullness may feel like it has decreased  Your nipples should look the same as they did before you started breastfeeding  Breasts that feel full before and empty after breastfeeding are signs that breastfeeding is going well  Breast problems that can occur while you are breastfeeding:   Nipple soreness  may occur when you begin to breastfeed your baby  You may also have nipple soreness if your baby is not latched on to your breast correctly  Correct positioning and latch-on may decrease or stop the pain in your nipples  Work with your caregivers to help your baby latch on correctly  It may also be helpful to place warm, wet compresses on your nipples to help decrease pain  Plugged milk ducts  may cause painful breast lumps  Plugged ducts may be caused by not emptying your breasts completely during feedings  When your baby pauses during breastfeeding, massage and gently squeeze your breast  Gentle massage may unplug a blocked milk duct  Pump out any milk left in your breasts after your baby is done breastfeeding  Avoid wearing tight tops, tight bras, or under-wire bras, because they may put pressure on your breasts  Engorgement  may occur as your milk comes in soon after you begin breastfeeding  Engorgement may cause your breasts to become swollen and painful  Your breasts may also become engorged if you miss a feeding or you do not breastfeed on demand  The best way to decrease engorgement symptoms is to empty your breasts by feeding your baby often  Engorgement can make it hard for your baby to latch on to your breast  If this happens, express a small amount of milk and then have your baby latch on   Cold compresses, gel packs, or ice packs on your breasts can help decrease pain and swelling  Ask your caregiver how often and how long you should use cold, or ice packs  A breast infection called mastitis  can develop if you have plugged milk ducts or engorgement  Mastitis causes your breasts to become red, swollen, and painful  You may also have flu-like symptoms, such as chills and a fever  Place heat on your breasts to help decrease the pain  You may want to place a moist, warm cloth on the painful breast or both of your breasts  Ask how often to do this  Your primary healthcare provider [facility]) may suggest that you take an NSAID, such as ibuprofen, to decrease pain and swelling  He may also order antibiotics to treat mastitis  Ask about feeding your baby when you have a breast infection  How to help prevent or manage breast problems while you are breastfeeding:   Learn how to position your baby and latch him on correctly  To latch your baby correctly to your breast, make sure that his mouth covers most of your areola (dark area around your nipple)  He should not be attached only to the nipple  Your baby is latched on well if you feel comfortable and do not feel pain  A correct latch helps him get enough milk and can help to prevent sore nipples and other breast problems  There are several breastfeeding positions that you can try  Find the position that works best for you and your baby  Ask your caregiver for more information about how to hold and breastfeed your baby  Prevent biting  Your baby may get teeth at about 1to 3months of age  To help prevent biting, break his suction once he is finished or if he has fallen asleep  To break his suction, slip a finger into the side of his mouth  If your baby bites you, respond with surprise or unhappiness  Offer praise when he does not bite you  Breastfeed your baby regularly  Feed your baby 8 to 12 times a day  You may need to wake up your baby at night to feed him   It is okay to feed from 1 or both breasts at each feeding  Your baby should breastfeed from both breasts equally over the course of a day  If your baby only feeds from 1 side during a feeding, offer your other breast to him first for the next feeding  Schedule and keep follow-up visits  Talk to your baby's pediatrician or your PHP during follow-up visits if you have breast problems  Caregivers may suggest that you, or you and your partner, attend classes on breastfeeding  You also may want to join a breastfeeding support group  Caregivers may suggest that you see a lactation consultant  This is a caregiver who can help you with breastfeeding  Contact your PHP if:   You have a fever and chills  You have body aches and you feel like you do not have any energy  One or both of your breasts is red, swollen or hard, painful, and feels warm or hot  You have breast engorgement that does not get better within 24 hours  You see or feel a lump in your breast that hurts when you touch it  You have nipple pain during breastfeeding or between feedings  Your nipples are red, dry, cracked, or bleeding, or they have scabs on them  You have questions or concerns about your condition or care  © 2014 [address] is for End User's use only and may not be sold, redistributed or otherwise used for commercial purposes  All illustrations and images included in CareNotes® are the copyrighted property of A D [employer] M [employer] Inc  or Pato Riggs  The above information is an  only  It is not intended as medical advice for individual conditions or treatments  Talk to your doctor, nurse or pharmacist before following any medical regimen to see if it is safe and effective for you

## 2022-12-05 NOTE — PROGRESS NOTES
Rapid Response:     Notified of rapid response called on Darcie Robbins due to syncopal episode  [facility]  She reports feeling lightheaded while sitting supported in bed  She had been in that position for a while (no acute change in position)  She denies dizziness or lightheadedness prior to that immediate feeling  She reports suddenly waking up with everyone around her  She denies any chest pain, palpitations, shortness of breath, or abdominal pain  She was assessed earlier today and was noted to be tachycardic but otherwise asymptomatic and physical exam was otherwise unremarkable  At that time, decision was made for stat CT PE  STAT Hgb resulted 10  Vitals:    12/05/22 1645 12/05/22 1658 12/05/22 1800 12/05/22 1803   BP:  109/72 118/59 127/72   BP Location:  Right arm     Pulse: (!) 140 (!) 133 (!) 127 (!) 150   Resp:  18 22    Temp:  98 5 °F (36 9 °C) 98 8 °F (37 1 °C) 97 7 °F (36 5 °C)   TempSrc:  Oral Oral Oral   SpO2: 95% 96% 95% 96%   Weight:       Height:         Physical Exam  Vitals reviewed  Constitutional:       Appearance: She is well-developed and well-nourished  Cardiovascular:      Rate and Rhythm: Regular rhythm  Tachycardia present  Heart sounds: Normal heart sounds  No murmur heard  No friction rub  No gallop  Pulmonary:      Effort: Pulmonary effort is normal  No respiratory distress  Breath sounds: Normal breath sounds  No stridor  No wheezing  Abdominal:      General: There is no distension  Palpations: Abdomen is soft  There is no mass  Tenderness: There is no abdominal tenderness  There is no guarding or rebound  Comments: Incision clean, dry, and intact without evidence of infection, erythema, or bleeding  Patient denied abdominal pain with superficial and deep pressure  Benign abdomen  Genitourinary:     Comments: Uterus nontender, firm at the umbilicus; Minimal vaginal bleeding  Skin:     General: Skin is warm and dry  Psychiatric:         Mood and Affect: Mood and affect normal          Behavior: Behavior normal        A/P:  Raúl Singh is a 30yo  s/p scheduled RLTCS with tachycardia and syncopal episode  Concern for PE and therefore, patient was taken STAT to CT after rapid response  Minimal concern for intraabdominal bleeding at this time given benign abdominal exam and stable Hgb  Possible syncope secondary to postpartum changes  Will continue assessment at this time  Appreciate assistance of critical care team!     True Yepez MD  OB/GYN  2022  6:39 PM

## 2022-12-05 NOTE — PROGRESS NOTES
Post-Op Note    Went to assess patient postoperatively  Pt is POD#0 from RLTCS  Reports feeling well overall  Lochia minimal to moderate  Denies chest pain, SOB  Not yet ambulating, not yet passing flatus, no bowel movement  Of note, QBL 901cc  UOP: 75cc out at 1146; valenzuela catheter currently with 700cc clear yellow urine in the bag  /60 (BP Location: Right arm)   Pulse (!) 118 Comment: notified nurse  Temp 99 8 °F (37 7 °C) (Oral) Comment: notified nurse  Resp 18   Ht 5' 6" (1 676 m)   Wt (!) 141 kg (311 lb)   LMP 03/06/2022   SpO2 97%   Breastfeeding Unknown   BMI 50 20 kg/m²   Gen: Reclining in NAD  CV: Tachycardia, regular rhythm, no murmurs/rubs/gallops  Pulm: CTAB, no wheezes, rhonchi  Abd: Minimal tenderness to palpation, fundus slightly above umbilicus  Incision C/D/I without erythema  : Lochia minimal to moderate  Ext: SCDs in place, B/L lower extremities without erythema, warmth, tenderness, palpable cords    Postoperative and postpartum patient with tachycardia and SpO2 91-92% on room air (continuous pulse ox on); BMI 50  Plan for CBC, 2 LNC, stat CT PE to assess possibility of pulmonary embolism      Discussed with Dr Kendrick Long, PGY3

## 2022-12-05 NOTE — UTILIZATION REVIEW
NOTIFICATION OF INPATIENT ADMISSION   MATERNITY/DELIVERY AUTHORIZATION REQUEST   SERVICING FACILITY:   [address] [place] [address] - L&D, [place], NICU  [address]  Tax ID: [id number]  NPI: [id number]   ATTENDING PROVIDER:  Attending Name and NPI#: Theron Franks Md [[id number]]  Address: [address] [place] [address]  Phone: [phone]   ADMISSION INFORMATION:  Place of Service: Inpatient [address] [facility]  [address]  Place of Service Code: 21  Inpatient Admission Date/Time: 22  8:02 AM  Discharge Date/Time: No discharge date for patient encounter  Admitting Diagnosis Code/Description:  Previous  section complicating pregnancy [L99 962]  Gestational diabetes [O24 419]  39 weeks gestation of pregnancy [Z3A 39]  Encounter for  delivery without indication [O82]     Mother: Tacos Kim 1995 Estimated Date of Delivery: 22  Delivering clinician: Theron Franks    OB History        2    Para   2    Term   2            AB        Living   2       SAB        IAB        Ectopic        Multiple   0    Live Births   2                Name & MRN:   Information for the patient's :  Indiana Campbell [[id number]]      Delivery Information:  Sex: female  Delivered 2022 11:10 AM by , Low Transverse; Gestational Age: 36w3d    [place] Measurements:  Weight: 6 lb 15 3 oz (3155 g); Height: 19"    APGAR 1 minute 5 minutes 10 minutes   Totals: 9 9      [place] Birth Information: 28 y o  female MRN: [id number] Unit/Bed#: LD PACU-01   Birthweight: No birth weight on file  Gestational Age: <None> Delivery Type:    APGARS Totals:        UTILIZATION REVIEW CONTACT:  Raoul Zamora Utilization   Network Utilization Review Department  Phone: [phone]  Fax [phone]  Email: [email or web address]  Contact for approvals/pending authorizations, clinical reviews, and discharge  PHYSICIAN ADVISORY SERVICES:  Medical Necessity Denial & Rfgh-nk-Kqbs Review  Phone: [phone]  Fax: [phone]  Email: [email or web address]

## 2022-12-05 NOTE — ANESTHESIA PREPROCEDURE EVALUATION
Procedure:   SECTION () REPEAT (Uterus)    Relevant Problems   GYN   (+) 39 weeks gestation of pregnancy      Endocrine   (+) Diet controlled gestational diabetes mellitus (GDM) in third trimester      Other   (+) History of  delivery, antepartum   (+) Maternal morbid obesity in third trimester, antepartum ([address] )        Physical Exam    Airway    Mallampati score: III  TM Distance: >3 FB  Neck ROM: full     Dental       Cardiovascular      Pulmonary      Other Findings        Anesthesia Plan  ASA Score- 3     Anesthesia Type- spinal with ASA Monitors  Additional Monitors:   Airway Plan:           Plan Factors-    Chart reviewed  Patient summary reviewed  Patient is not a current smoker  Patient did not smoke on day of surgery  Induction- intravenous  Postoperative Plan- Plan for postoperative opioid use  Informed Consent- Anesthetic plan and risks discussed with patient  I personally reviewed this patient with the CRNA  Discussed and agreed on the Anesthesia Plan with the CRNA  Jason Forte

## 2022-12-05 NOTE — ANESTHESIA POSTPROCEDURE EVALUATION
Post-Op Assessment Note    CV Status:  Stable  Pain Score: 0    Pain management: adequate     Mental Status:  Alert and awake   Hydration Status:  Euvolemic   PONV Controlled:  Controlled   Airway Patency:  Patent      Post Op Vitals Reviewed: Yes      Staff: CRNA   Comments: epidural catheter tip intact following removal    Post-op block assessment: no complications      No notable events documented      BP   105/58   Temp      Pulse 107   Resp   20   SpO2   99%

## 2022-12-06 ENCOUNTER — APPOINTMENT (INPATIENT)
Dept: NON INVASIVE DIAGNOSTICS | Facility: HOSPITAL | Age: 27
End: 2022-12-06

## 2022-12-06 DIAGNOSIS — Z13.1 DIABETES MELLITUS SCREENING: ICD-10-CM

## 2022-12-06 DIAGNOSIS — Z86.32 HISTORY OF DIET CONTROLLED GESTATIONAL DIABETES MELLITUS (GDM): Primary | ICD-10-CM

## 2022-12-06 PROBLEM — K66.1 HEMOPERITONEUM: Status: ACTIVE | Noted: 2022-12-06

## 2022-12-06 LAB
ANION GAP SERPL CALCULATED.3IONS-SCNC: 5 MMOL/L (ref 4–13)
ANION GAP SERPL CALCULATED.3IONS-SCNC: 8 MMOL/L (ref 4–13)
AORTIC ROOT: 3.3 CM
APICAL FOUR CHAMBER EJECTION FRACTION: 58 %
ASCENDING AORTA: 2.8 CM
ATRIAL RATE: 133 BPM
BUN SERPL-MCNC: 7 MG/DL (ref 5–25)
BUN SERPL-MCNC: 9 MG/DL (ref 5–25)
CA-I BLD-SCNC: 1.04 MMOL/L (ref 1.12–1.32)
CALCIUM SERPL-MCNC: 7.7 MG/DL (ref 8.4–10.2)
CALCIUM SERPL-MCNC: 8 MG/DL (ref 8.4–10.2)
CHLORIDE SERPL-SCNC: 106 MMOL/L (ref 96–108)
CHLORIDE SERPL-SCNC: 106 MMOL/L (ref 96–108)
CO2 SERPL-SCNC: 22 MMOL/L (ref 21–32)
CO2 SERPL-SCNC: 27 MMOL/L (ref 21–32)
CREAT SERPL-MCNC: 0.48 MG/DL (ref 0.6–1.3)
CREAT SERPL-MCNC: 0.52 MG/DL (ref 0.6–1.3)
E WAVE DECELERATION TIME: 217 MS
ERYTHROCYTE [DISTWIDTH] IN BLOOD BY AUTOMATED COUNT: 13.8 % (ref 11.6–15.1)
ERYTHROCYTE [DISTWIDTH] IN BLOOD BY AUTOMATED COUNT: 13.9 % (ref 11.6–15.1)
FRACTIONAL SHORTENING: 29 % (ref 28–44)
GFR SERPL CREATININE-BSD FRML MDRD: 130 ML/MIN/1.73SQ M
GFR SERPL CREATININE-BSD FRML MDRD: 133 ML/MIN/1.73SQ M
GLUCOSE SERPL-MCNC: 113 MG/DL (ref 65–140)
GLUCOSE SERPL-MCNC: 134 MG/DL (ref 65–140)
HCT VFR BLD AUTO: 22.1 % (ref 34.8–46.1)
HCT VFR BLD AUTO: 24.6 % (ref 34.8–46.1)
HGB BLD-MCNC: 7.1 G/DL (ref 11.5–15.4)
HGB BLD-MCNC: 7.2 G/DL (ref 11.5–15.4)
HGB BLD-MCNC: 7.8 G/DL (ref 11.5–15.4)
INTERVENTRICULAR SEPTUM IN DIASTOLE (PARASTERNAL SHORT AXIS VIEW): 0.8 CM
INTERVENTRICULAR SEPTUM: 0.8 CM (ref 0.6–1.1)
LAAS-AP2: 24.9 CM2
LAAS-AP4: 23.8 CM2
LACTATE SERPL-SCNC: 1.8 MMOL/L (ref 0.5–2)
LEFT ATRIUM SIZE: 3.8 CM
LEFT INTERNAL DIMENSION IN SYSTOLE: 3.7 CM (ref 2.1–4)
LEFT VENTRICULAR INTERNAL DIMENSION IN DIASTOLE: 5.2 CM (ref 3.5–6)
LEFT VENTRICULAR POSTERIOR WALL IN END DIASTOLE: 0.8 CM
LEFT VENTRICULAR STROKE VOLUME: 72 ML
LVSV (TEICH): 72 ML
MAGNESIUM SERPL-MCNC: 1.4 MG/DL (ref 1.9–2.7)
MAGNESIUM SERPL-MCNC: 1.8 MG/DL (ref 1.9–2.7)
MCH RBC QN AUTO: 27.2 PG (ref 26.8–34.3)
MCH RBC QN AUTO: 27.5 PG (ref 26.8–34.3)
MCHC RBC AUTO-ENTMCNC: 31.7 G/DL (ref 31.4–37.4)
MCHC RBC AUTO-ENTMCNC: 32.1 G/DL (ref 31.4–37.4)
MCV RBC AUTO: 85 FL (ref 82–98)
MCV RBC AUTO: 87 FL (ref 82–98)
MV PEAK A VEL: 0.78 M/S
MV PEAK E VEL: 79 CM/S
MV STENOSIS PRESSURE HALF TIME: 63 MS
MV VALVE AREA P 1/2 METHOD: 3.49 CM2
P AXIS: 49 DEGREES
PHOSPHATE SERPL-MCNC: 3.6 MG/DL (ref 2.7–4.5)
PLATELET # BLD AUTO: 208 THOUSANDS/UL (ref 149–390)
PLATELET # BLD AUTO: 209 THOUSANDS/UL (ref 149–390)
PMV BLD AUTO: 11.2 FL (ref 8.9–12.7)
PMV BLD AUTO: 11.2 FL (ref 8.9–12.7)
POTASSIUM SERPL-SCNC: 3.8 MMOL/L (ref 3.5–5.3)
POTASSIUM SERPL-SCNC: 4.3 MMOL/L (ref 3.5–5.3)
PR INTERVAL: 130 MS
QRS AXIS: 73 DEGREES
QRSD INTERVAL: 82 MS
QT INTERVAL: 296 MS
QTC INTERVAL: 440 MS
RBC # BLD AUTO: 2.61 MILLION/UL (ref 3.81–5.12)
RBC # BLD AUTO: 2.84 MILLION/UL (ref 3.81–5.12)
RIGHT ATRIUM AREA SYSTOLE A4C: 19.6 CM2
RIGHT VENTRICLE ID DIMENSION: 4.9 CM
RPR SER QL: NORMAL
SL CV LEFT ATRIUM LENGTH A2C: 6 CM
SL CV LV EF: 55
SL CV PED ECHO LEFT VENTRICLE DIASTOLIC VOLUME (MOD BIPLANE) 2D: 130 ML
SL CV PED ECHO LEFT VENTRICLE SYSTOLIC VOLUME (MOD BIPLANE) 2D: 58 ML
SODIUM SERPL-SCNC: 136 MMOL/L (ref 135–147)
SODIUM SERPL-SCNC: 138 MMOL/L (ref 135–147)
T WAVE AXIS: 19 DEGREES
TR MAX PG: 19 MMHG
TR PEAK VELOCITY: 2.2 M/S
TRICUSPID VALVE PEAK REGURGITATION VELOCITY: 2.19 M/S
VENTRICULAR RATE: 133 BPM
WBC # BLD AUTO: 13.92 THOUSAND/UL (ref 4.31–10.16)
WBC # BLD AUTO: 14.67 THOUSAND/UL (ref 4.31–10.16)

## 2022-12-06 RX ORDER — CALCIUM GLUCONATE 20 MG/ML
2 INJECTION, SOLUTION INTRAVENOUS ONCE
Status: COMPLETED | OUTPATIENT
Start: 2022-12-06 | End: 2022-12-06

## 2022-12-06 RX ORDER — MAGNESIUM SULFATE HEPTAHYDRATE 40 MG/ML
4 INJECTION, SOLUTION INTRAVENOUS ONCE
Status: COMPLETED | OUTPATIENT
Start: 2022-12-06 | End: 2022-12-06

## 2022-12-06 RX ORDER — ENOXAPARIN SODIUM 100 MG/ML
40 INJECTION SUBCUTANEOUS EVERY 12 HOURS SCHEDULED
Status: DISCONTINUED | OUTPATIENT
Start: 2022-12-06 | End: 2022-12-08 | Stop reason: HOSPADM

## 2022-12-06 RX ADMIN — ACETAMINOPHEN 650 MG: 325 TABLET ORAL at 00:15

## 2022-12-06 RX ADMIN — ACETAMINOPHEN 650 MG: 325 TABLET ORAL at 05:22

## 2022-12-06 RX ADMIN — OXYCODONE HYDROCHLORIDE 10 MG: 10 TABLET ORAL at 10:31

## 2022-12-06 RX ADMIN — OXYCODONE HYDROCHLORIDE 10 MG: 10 TABLET ORAL at 21:54

## 2022-12-06 RX ADMIN — DOCUSATE SODIUM 100 MG: 100 CAPSULE, LIQUID FILLED ORAL at 08:33

## 2022-12-06 RX ADMIN — IBUPROFEN 600 MG: 600 TABLET, FILM COATED ORAL at 18:16

## 2022-12-06 RX ADMIN — MAGNESIUM SULFATE HEPTAHYDRATE 4 G: 40 INJECTION, SOLUTION INTRAVENOUS at 03:05

## 2022-12-06 RX ADMIN — DOCUSATE SODIUM 100 MG: 100 CAPSULE, LIQUID FILLED ORAL at 18:16

## 2022-12-06 RX ADMIN — CALCIUM GLUCONATE 2 G: 20 INJECTION, SOLUTION INTRAVENOUS at 03:05

## 2022-12-06 RX ADMIN — ENOXAPARIN SODIUM 40 MG: 40 INJECTION SUBCUTANEOUS at 21:43

## 2022-12-06 NOTE — CONSULTS
Emanuel Joiner 41 1995, 28 y o  female MRN: [id number]  Unit/Bed#: ICU 11 Encounter: [id number]  Primary Care Provider: Virginia Stovall MD   Date and time admitted to hospital: 2022  8:02 AM    Consults    Syncope  Assessment & Plan  -has history of syncope with prodrome, last episode , has never been evaluated for same  -syncope occurs both while active and at rest, is preceeded by vision, hearing loss, and lightheadedness  -became tachycardic, tachypnic, and mildly hypoxic several hours prior to syncopal episode  -similar episode this afternoon while laying in bed  -episode resolved within 30 seconds, noted to be diaphoretic afterwards   -denies any complaints at this time  -CT PE study, CT A/p  with normal postsurgical ascites and small volume pneumoperitoneum  -H/H stable, CMP WNL, pending BNP, trop, and coags    -ECG pending  -monitor H/H  -monitor telemetry for evidence of arrhythmia  -consider echocardiogram      Tachycardia  Assessment & Plan  -mildly tachycardic s/p , became more tachycardic just prior to syncopal episode to 130's  -denies chest pain or shortness of breath, denies history of cardiac problems or palpitations  -likely post-surgical with fluid shift    -monitor H/H  -monitor telemetry  -ECG and cardiac labs pending    Hypoxia  Assessment & Plan  -became mildly hypoxic 3-4 hours after  today, also became increasingly tachycardic and tachypnic  -denies shortness of breath, chest pain, nausea, or vomiting  -no history of lung disease or personal or family cardiac or clotting history  -placed on 2L NC with improvement in SpO2 to 97%  -trial off oxygen in ICU resulted in patient de-satting to 91-92% on room air at rest  -CT PE study without evidence of PE or lung abnormality    -labs pending  -continue to monitor cardiorespiratory status, wean off oxygen as tolerated      * S/P repeat low transverse   Assessment & Plan  -uncomplicated RLTCS at 90O3E 2022 by Dr Christiano Alex  -EBL 901mL  -is breastfeeding, pump at bedside  -OB following  -pitocin drip d/c'ed by OB     -hold lovenox until 2022 per Dr Christiano Alex  -monitor H/H, blood loss  -d/c valenzuela in am  -pain control per OB    Diet controlled gestational diabetes mellitus (GDM) in third trimester  Assessment & Plan  Lab Results   Component Value Date    HGBA1C 5 0 2022       Recent Labs     22  1012   POCGLU 67       Blood Sugar Average: Last 72 hrs:  (P) 67     -diet controlled  -no indication for accuchecks or sliding scale insulin per Dr Christiano Alex  -will follow up for GTT at 6 weeks post-partum    39 weeks gestation of pregnancy  Assessment & Plan  -A1GDM this pregnancy  -s/p uncomplicated RTLCS     -post-surgical care per OB    Maternal morbid obesity in third trimester, antepartum ([facility] [address] )  Assessment & Plan  - 4 lbs total weight gain this pregnancy  -consider nutritional and exercise counseling    History of  delivery, antepartum  Assessment & Plan  -primary LTCS 8 years ago  -s/p scheduled RLTCS 2022 at 39w0d      -------------------------------------------------------------------------------------------------------------  Chief Complaint: syncope    History of Present Illness     Shahab Hooker is a 28 y o  female with A1GDM and obesity, s/p RLTCS 2022 who was transferred to ICU after rapid response for syncope  Had LTCS at 51PA, uncomplicated per Dr Maryanne Gill) with EBL 901ml  Was feeling "fine" afterwards, however became increasingly tachycardic, tachypnic, and mildly hypoxic(92% on room air), placed on 2L oxygen via NC  Had sudden onset lightheadedness, vision and hearing loss, and diaphoresis while laying in bed, then had brief syncopal episode which spontaneously resolved within 30 seconds    Pt states that this is consistent with how she felt after prior syncopal episodes, however had not been evaluated for them in the past, never had syncope while lying down before  Denies significant blood loss, denies chest pain, shortness of breath, palpitations, headache, numbness, weakness, abdominal pain, nausea, vomiting, or unilateral calf pain or swelling  No personal or family history of cardiac problems or clotting disorders  No recent travel or sick contacts  Per OB, no significant blood loss  History obtained from the patient   -------------------------------------------------------------------------------------------------------------  Dispo: Admit to Stepdown Level 1    Code Status: Level 1 - Full Code  --------------------------------------------------------------------------------------------------------------  Review of Systems   Constitutional: Negative for chills, diaphoresis, fatigue and fever  HENT: Negative  Eyes: Negative  Negative for photophobia and visual disturbance  Respiratory: Negative  Negative for cough and shortness of breath  Cardiovascular: Negative  Negative for chest pain, palpitations and leg swelling  Gastrointestinal: Negative  Negative for abdominal distention, abdominal pain, nausea and vomiting  Genitourinary: Negative  Negative for pelvic pain  Musculoskeletal: Negative for back pain and neck pain  Endorses right shoulder pain s/p   Skin: Negative for color change  Neurological: Positive for syncope  Negative for dizziness, weakness, light-headedness and headaches  Hematological: Does not bruise/bleed easily  Psychiatric/Behavioral: Negative for confusion  All other systems reviewed and are negative  A 12-point, complete review of systems was reviewed and negative except as stated above     Physical Exam  Vitals and nursing note reviewed  Constitutional:       Appearance: Normal appearance  She is not ill-appearing or diaphoretic  HENT:      Head: Normocephalic and atraumatic        Right Ear: External ear normal       Left Ear: External ear normal       Nose: Nose normal  No congestion or rhinorrhea  Mouth/Throat:      Mouth: Mucous membranes are moist       Pharynx: Oropharynx is clear  Eyes:      Extraocular Movements: Extraocular movements intact  Conjunctiva/sclera: Conjunctivae normal       Pupils: Pupils are equal, round, and reactive to light  Cardiovascular:      Rate and Rhythm: Regular rhythm  Tachycardia present  Pulses: Normal pulses  Heart sounds: Normal heart sounds  No murmur heard  No friction rub  No gallop  Pulmonary:      Effort: Pulmonary effort is normal  No respiratory distress  Breath sounds: Normal breath sounds  Abdominal:      General: Abdomen is protuberant  Bowel sounds are normal       Palpations: Abdomen is soft  Tenderness: There is no abdominal tenderness  Comments:  incision with dressing in place, no significant drainage or bleeding on dressing  Musculoskeletal:         General: Normal range of motion  Cervical back: Normal range of motion  Right lower leg: Edema present  Left lower leg: Edema present  Skin:     General: Skin is warm and dry  Capillary Refill: Capillary refill takes less than 2 seconds  Coloration: Skin is pale  Neurological:      General: No focal deficit present  Mental Status: She is alert and oriented to person, place, and time     Psychiatric:         Mood and Affect: Mood normal          Behavior: Behavior normal        --------------------------------------------------------------------------------------------------------------  Vitals:   Vitals:    22 1645 22 1658 22 1800 22 1803   BP:  109/72 118/59 127/72   BP Location:  Right arm     Pulse: (!) 140 (!) 133 (!) 127 (!) 150   Resp:  18 22    Temp:  98 5 °F (36 9 °C) 98 8 °F (37 1 °C) 97 7 °F (36 5 °C)   TempSrc:  Oral Oral Oral   SpO2: 95% 96% 95% 96%   Weight:       Height:         Temp  Min: 97 6 °F (36 4 °C)  Max: 99 8 °F (37 7 °C)     Height: 5' 6" (167 6 cm)  Body mass index is 50 2 kg/m²  Laboratory and Diagnostics:  Results from last 7 days   Lab Units 22  1644 22  0840   WBC Thousand/uL 18 18* 11 65*   HEMOGLOBIN g/dL 10 6* 11 1*   HEMATOCRIT % 32 5* 34 3*   PLATELETS Thousands/uL 277 264     Results from last 7 days   Lab Units 22  1718   SODIUM mmol/L 136   POTASSIUM mmol/L 3 8   CHLORIDE mmol/L 105   CO2 mmol/L 22   ANION GAP mmol/L 9   BUN mg/dL 9   CREATININE mg/dL 0 41*   CALCIUM mg/dL 8 4   GLUCOSE RANDOM mg/dL 158*   ALT U/L 31   AST U/L 19   ALK PHOS U/L 141*   ALBUMIN g/dL 2 9*   TOTAL BILIRUBIN mg/dL 0 34          Results from last 7 days   Lab Units 22  1922   INR  0 95   PTT seconds 26              ABG:    VBG:          Micro:        EKG: pending  Imaging: I have personally reviewed pertinent reports          Historical Information   Past Medical History:   Diagnosis Date   • Morbid obesity with BMI of 45 0-49 9, adult [facility])      Past Surgical History:   Procedure Laterality Date   •  SECTION, LOW TRANSVERSE  2014    LAST ASSESSED: 1/8/15     Social History   Social History     Substance and Sexual Activity   Alcohol Use Not Currently     Social History     Substance and Sexual Activity   Drug Use No     Social History     Tobacco Use   Smoking Status Never   Smokeless Tobacco Never       Family History:   Family History   Problem Relation Age of Onset   • Cancer Mother    • Asthma Father    • Asthma Brother    • Cancer Maternal Grandmother    • Cancer Paternal Grandmother      I have reviewed this patient's family history and commented on sigificant items within the HPI      Medications:  Current Facility-Administered Medications   Medication Dose Route Frequency   • acetaminophen (TYLENOL) tablet 650 mg  650 mg Oral Q6H [facility]   • docusate sodium (COLACE) capsule 100 mg  100 mg Oral BID   • [START ON 2022] enoxaparin (LOVENOX) subcutaneous injection 60 mg  60 mg Subcutaneous Q12H [address]   • HYDROmorphone (DILAUDID) injection 0 5 mg  0 5 mg Intravenous Q2H PRN   • [START ON 12/6/2022] ibuprofen (MOTRIN) tablet 600 mg  600 mg Oral Q6H PRN   • ketorolac (TORADOL) injection 30 mg  30 mg Intravenous Q6H PRN   • lactated ringers infusion  125 mL/hr Intravenous Continuous   • nalbuphine (NUBAIN) injection 5 mg  5 mg Intravenous Q3H PRN   • naloxone (NARCAN) injection 0 1 mg  0 1 mg Intravenous Q3 min PRN   • ondansetron (ZOFRAN) injection 4 mg  4 mg Intravenous Q8H PRN   • [START ON 12/6/2022] oxyCODONE (ROXICODONE) IR tablet 5 mg  5 mg Oral Q4H PRN    Or   • [START ON 12/6/2022] oxyCODONE (ROXICODONE) immediate release tablet 10 mg  10 mg Oral Q4H PRN   • simethicone (MYLICON) chewable tablet 80 mg  80 mg Oral 4x Daily PRN     Home medications:  Prior to Admission Medications   Prescriptions Last Dose Informant Patient Reported? Taking? Blood Glucose Monitoring Suppl (OneTouch Verio Flex System) w/Device KIT 12/5/2022  Yes Yes   Sig: Use   OneTouch Delica Lancets 61X MISC 12/5/2022  No Yes   Sig: Use 4 a day or as directed  OneTouch Verio test strip 12/5/2022  No Yes   Sig: Test 4 times a day and as instructed  Gestational diabetes  Prenatal MV-Min-Fe Fum-FA-DHA (PRENATAL+DHA PO) 12/4/2022  Yes Yes   Sig: Take by mouth daily      Facility-Administered Medications: None     Allergies:  No Known Allergies  ------------------------------------------------------------------------------------------------------------  Advance Directive and Living Will:      Power of :    POLST:    ------------------------------------------------------------------------------------------------------------  Care Time Delivered:   Upon my evaluation, this patient had a high probability of imminent or life-threatening deterioration due to tachycardia and hypoxia s/p syncope at rest, which required my direct attention, intervention, and personal management    I have personally provided 60 minutes [phone] to 1920) of critical care time, exclusive of procedures, teaching, family meetings, and any prior time recorded by providers other than myself  Eben Campoverde DO        Portions of the record may have been created with voice recognition software  Occasional wrong word or "sound a like" substitutions may have occurred due to the inherent limitations of voice recognition software    Read the chart carefully and recognize, using context, where substitutions have occurred

## 2022-12-06 NOTE — ASSESSMENT & PLAN NOTE
S/p scheduled RLTCS 12/5,  cc   Subsequent re-exploration for 1L hemoperitoneum and rectus hematoma, evacuated; wash out completed   No active bleeding seen   Previously monitored in ICU s/p rapid response and persistent tachycardia   Transferred to postpartum unit 12/6  Voiding spontaneously, passing flatus  Encourage ambulation/OOB today  Routine postpartum care  Hgb this am 7 2->6 5, 1u pRBCs->7 9 post-transfsuion->7 9 this am  Stable, consider discharge

## 2022-12-06 NOTE — QUICK NOTE
Patient seen and evaluated by Critical Care today and deemed to be appropriate for transfer to Med-Surg   Spoke to Dr Jennifer Haider from OB-GYN to accept patient transfer  Major changes to treatment plan from the day of transfer include F/U noon labs for today: Hgb, Bmp, mag and replace as needed  Deras out this AM, tolerating a diet  F/U official ECHO results for syncope w/u  OOB with assistance  Critical care can be contacted via [employer] with any questions or concerns

## 2022-12-06 NOTE — ASSESSMENT & PLAN NOTE
· Pt was brought to OR for repeat exploration given R rectus sheath hematoma with extension into peritoneum  · Intra-op EBL 1L clotted blood in abdomen, given 1u PRBCs and 1g TXA in the OR

## 2022-12-06 NOTE — ASSESSMENT & PLAN NOTE
-has history of syncope with prodrome, last episode 2020, has never been evaluated for same  -syncope occurs both while active and at rest, is preceeded by vision, hearing loss, and lightheadedness  -became tachycardic, tachypnic, and mildly hypoxic several hours prior to syncopal episode  -similar episode this afternoon while laying in bed  -episode resolved within 30 seconds, noted to be diaphoretic afterwards   -denies any complaints at this time  -CT PE study without evidence of PE, does note small volume post-surgical ascites and pneumoperitoneum  -H/H stable, CMP WNL, pending BNP, trop, and coags    -ECG pending  -CT abd/pelvis pending read  -monitor H/H  -monitor telemetry for evidence of arrhythmia  -consider echocardiogram

## 2022-12-06 NOTE — ASSESSMENT & PLAN NOTE
· has history of syncope with prodrome, last episode 2020, has never been evaluated for same  · syncope occurs both while active and at rest, is preceeded by vision, hearing loss, and lightheadedness  · became tachycardic, tachypnic, and mildly hypoxic several hours prior to syncopal episode  · similar episode this afternoon while laying in bed  · episode resolved within 30 seconds, noted to be diaphoretic afterwards   · denies any complaints at this time  · CT PE study without evidence of PE      · ECG w/ no acute abnormalities  · CT abd/pelvis demonstrated hemoperitoneum with rectus sheath hematoma   · Repeat CBC in AM, stable post transfusion at 7 8  · monitor telemetry for evidence of arrhythmia   · consider echocardiogram

## 2022-12-06 NOTE — ASSESSMENT & PLAN NOTE
-became mildly hypoxic 3-4 hours after  today, also became increasingly tachycardic and tachypnic  -denies shortness of breath, chest pain, nausea, or vomiting  -no history of lung disease or personal or family cardiac or clotting history  -placed on 2L NC with improvement in SpO2 to 97%  -trial off oxygen in ICU resulted in patient de-satting to 91-92% on room air at rest  -CT PE study without evidence of PE or lung abnormality    -labs pending  -continue to monitor cardiorespiratory status, wean off oxygen as tolerated

## 2022-12-06 NOTE — ASSESSMENT & PLAN NOTE
· uncomplicated RLTCS at 42J6F 12/05/2022 by Dr Hemalatha Baldwin  · EBL 901mL  · is breastfeeding, pump at bedside  · OB following, appreciate recs  · pitocin drip d/c'ed by OB   · hold lovenox until 12/06/2022 per Dr Hemalatha Baldwin  · Deras management per OB

## 2022-12-06 NOTE — ANESTHESIA PREPROCEDURE EVALUATION
Procedure:  LAPAROTOMY EXPLORATORY (Abdomen)    Relevant Problems   GYN   (+) 39 weeks gestation of pregnancy      Endocrine   (+) Diet controlled gestational diabetes mellitus (GDM) in third trimester      Other   (+) History of  delivery, antepartum   (+) Maternal morbid obesity in third trimester, antepartum ([address] )   (+) Prenatal care in third trimester   (+) S/P repeat low transverse    (+) Syncope   (+) Tachycardia      Lab Results   Component Value Date    SODIUM 135 2022    K 4 2 2022     2022    CO2 22 2022    AGAP 9 2022    BUN 10 2022    CREATININE 0 42 (L) 2022    GLUC 136 2022    GLUF 85 2022    CALCIUM 7 9 (L) 2022    AST 19 2022    ALT 31 2022    ALKPHOS 141 (H) 2022    TP 5 9 (L) 2022    TBILI 0 34 2022    EGFR 139 2022     Lab Results   Component Value Date    WBC 16 59 (H) 2022    HGB 8 5 (L) 2022    HCT 27 0 (L) 2022    MCV 84 2022     2022     lactated ringers, 125 mL/hr, Last Rate: 999 mL/hr (22 1802)      Invasive Devices     Peripheral Intravenous Line  Duration           Peripheral IV 22 Left;Ventral (anterior) Hand <1 day    Peripheral IV 22 Right Antecubital <1 day          Drain  Duration           Urethral Catheter Double-lumen;Non-latex 16 Fr  <1 day                  Physical Exam    Airway    Mallampati score: II  TM Distance: <3 FB  Neck ROM: full     Dental   No notable dental hx     Cardiovascular      Pulmonary      Other Findings        Anesthesia Plan  ASA Score- 4 Emergent    Anesthesia Type- general with ASA Monitors  Additional Monitors:   Airway Plan:           Plan Factors-Exercise tolerance (METS): <4 METS  Chart reviewed  EKG reviewed  Imaging results reviewed  Existing labs reviewed  Induction- intravenous and rapid sequence induction      Postoperative Plan- Plan for postoperative opioid use  Informed Consent- Anesthetic plan and risks discussed with patient  I personally reviewed this patient with the CRNA  Discussed and agreed on the Anesthesia Plan with the CRNA  Karolina Mcclelland

## 2022-12-06 NOTE — PROGRESS NOTES
[facility]  Progress Note - Shakeel Caputo 1995, 28 y o  female MRN: [id number]  Unit/Bed#: ICU 11 Encounter: [id number]  Primary Care Provider: Sonja Cartwright MD   Date and time admitted to hospital: 2022  8:02 AM    * S/P repeat low transverse   Assessment & Plan  · uncomplicated RLTCS at 31U1O 2022 by Dr Morgan Suh  · EBL 901mL  · is breastfeeding, pump at bedside  · OB following, appreciate recs  · pitocin drip d/c'ed by OB   · hold lovenox until 2022 per Dr Morgan Suh  · Deras management per OB      39 weeks gestation of pregnancy  Assessment & Plan  · A1GDM this pregnancy  · s/p uncomplicated RTLCS   · post-surgical care per OB    Hemoperitoneum  Assessment & Plan  · Pt was brought to OR for repeat exploration given R rectus sheath hematoma with extension into peritoneum  · Intra-op EBL 1L clotted blood in abdomen, given 1u PRBCs and 1g TXA in the OR    Hypoxia  Assessment & Plan  · became mildly hypoxic 3-4 hours after  on , also became increasingly tachycardic and tachypnic  · denies shortness of breath, chest pain, nausea, or vomiting  · no history of lung disease or personal or family cardiac or clotting history  · placed on 2L NC with improvement in SpO2 to 97%  · trial off oxygen in ICU resulted in patient de-satting to 91-92% on room air at rest  · CT PE study without evidence of PE or lung abnormality      · continue to monitor cardiorespiratory status, wean off oxygen as tolerated      Syncope  Assessment & Plan  · has history of syncope with prodrome, last episode , has never been evaluated for same  · syncope occurs both while active and at rest, is preceeded by vision, hearing loss, and lightheadedness  · became tachycardic, tachypnic, and mildly hypoxic several hours prior to syncopal episode  · similar episode this afternoon while laying in bed  · episode resolved within 30 seconds, noted to be diaphoretic afterwards · denies any complaints at this time  · CT PE study without evidence of PE      · ECG w/ no acute abnormalities  · CT abd/pelvis demonstrated hemoperitoneum with rectus sheath hematoma   · Repeat CBC in AM, stable post transfusion at 7 8  · monitor telemetry for evidence of arrhythmia   · consider echocardiogram      Tachycardia  Assessment & Plan  · mildly tachycardic s/p , became more tachycardic just prior to syncopal episode to 130's  · denies chest pain or shortness of breath, denies history of cardiac problems or palpitations      · 2/2 to hemorrhage, improved after OR  Diet controlled gestational diabetes mellitus (GDM) in third trimester  Assessment & Plan  Lab Results   Component Value Date    HGBA1C 5 0 2022       Recent Labs     22  1012   POCGLU 67       Blood Sugar Average: Last 72 hrs:  (P) 67     · diet controlled  · no indication for accuchecks or sliding scale insulin per Dr Nir Monsivais  · will follow up for GTT at 6 weeks post-partum    Maternal morbid obesity in third trimester, antepartum ([facility] [address] )  Assessment & Plan  - 4 lbs total weight gain this pregnancy  -consider nutritional and exercise counseling        ----------------------------------------------------------------------------------------  HPI/24hr events: 27 yo F who is POD 1 C/S and now POD 1 take back for evacuation of hemoperitoneum and rectus sheath hematoma  No obvious source of bleeding found, patient given 1u PRBCs in OR and 1g TXA and out of an abundance of caution the hysterotomy was oversewn  Patient appropriate for transfer out of the ICU today?: Patient does not meet criteria for referral to the ICU Follow-Up Clinic; referral has not been made     Disposition: Transfer to Stepdown Level 2  Code Status: Level 1 - Full Code  ---------------------------------------------------------------------------------------  SUBJECTIVE  Feeling comfortable, mild abdominal tenderness with palpation and intermittent discomfort radiating to R  Shoulder  Review of Systems   Constitutional: Negative for chills and fever  HENT: Negative for congestion  Respiratory: Negative for chest tightness and shortness of breath  Cardiovascular: Negative for chest pain  Gastrointestinal: Positive for abdominal distention and abdominal pain  Genitourinary: Negative for flank pain, pelvic pain and vaginal bleeding  Musculoskeletal: Negative for arthralgias and back pain  Neurological: Negative for weakness, light-headedness and headaches  Psychiatric/Behavioral: Negative for confusion  Review of systems was reviewed and negative unless stated above in HPI/24-hour events   ---------------------------------------------------------------------------------------  OBJECTIVE    Vitals   Vitals:    22 0132 22 0244 22 0305 22 0310   BP: 108/59 103/57 (!) 88/65 92/55   BP Location:  Right arm     Pulse: 89 89 92 100   Resp: 16 14 14 (!) 23   Temp:  98 2 °F (36 8 °C)     TempSrc:  Oral     SpO2: 97% 97% 96% 94%   Weight:  (!) 139 kg (305 lb 8 9 oz)     Height:         Temp (24hrs), Av 3 °F (36 8 °C), Min:97 °F (36 1 °C), Max:99 8 °F (37 7 °C)  Current: Temperature: 98 2 °F (36 8 °C)          Respiratory:    Nasal Cannula O2 Flow Rate (L/min): 2 L/min    Invasive/non-invasive ventilation settings   Respiratory    Lab Data (Last 4 hours)    None         O2/Vent Data (Last 4 hours)    None                Physical Exam  Constitutional:       General: She is not in acute distress  HENT:      Head: Normocephalic and atraumatic  Cardiovascular:      Rate and Rhythm: Normal rate and regular rhythm  Heart sounds: Normal heart sounds  Pulmonary:      Effort: Pulmonary effort is normal       Breath sounds: Normal breath sounds  Abdominal:      General: Abdomen is protuberant  There is no distension  Palpations: Abdomen is soft  Tenderness: There is generalized abdominal tenderness     Skin: General: Skin is warm and dry  Capillary Refill: Capillary refill takes less than 2 seconds  Neurological:      General: No focal deficit present  Mental Status: She is alert and oriented to person, place, and time  GCS: GCS eye subscore is 4  GCS verbal subscore is 5  GCS motor subscore is 6  Psychiatric:         Behavior: Behavior is cooperative  Laboratory and Diagnostics:  Results from last 7 days   Lab Units 12/06/22  0021 12/05/22 1922 12/05/22  1644 12/05/22  0840   WBC Thousand/uL 13 92* 16 59* 18 18* 11 65*   HEMOGLOBIN g/dL 7 8* 8 5* 10 6* 11 1*   HEMATOCRIT % 24 6* 27 0* 32 5* 34 3*   PLATELETS Thousands/uL 208 289 277 264   NEUTROS PCT %  --  90*  --   --    MONOS PCT %  --  3*  --   --      Results from last 7 days   Lab Units 12/06/22  0021 12/05/22 1922 12/05/22  1718   SODIUM mmol/L 136 135 136   POTASSIUM mmol/L 4 3 4 2 3 8   CHLORIDE mmol/L 106 104 105   CO2 mmol/L 22 22 22   ANION GAP mmol/L 8 9 9   BUN mg/dL 9 10 9   CREATININE mg/dL 0 52* 0 42* 0 41*   CALCIUM mg/dL 7 7* 7 9* 8 4   GLUCOSE RANDOM mg/dL 134 136 158*   ALT U/L  --   --  31   AST U/L  --   --  19   ALK PHOS U/L  --   --  141*   ALBUMIN g/dL  --   --  2 9*   TOTAL BILIRUBIN mg/dL  --   --  0 34     Results from last 7 days   Lab Units 12/06/22  0021 12/05/22 1922   MAGNESIUM mg/dL 1 4* 1 4*   PHOSPHORUS mg/dL 3 6 4 5      Results from last 7 days   Lab Units 12/05/22 1922   INR  0 95   PTT seconds 26          Results from last 7 days   Lab Units 12/06/22 0021 12/05/22 1922   LACTIC ACID mmol/L 1 8 1 3     ABG:    VBG:          Micro        EKG: NSR on tele, rate 88  Imaging: I have personally reviewed pertinent reports        Intake and Output  I/O       12/04 0701 12/05 0700 12/05 0701 12/06 0700    I V  (mL/kg)  2500 (18)    Blood  350    IV Piggyback  200    Total Intake(mL/kg)  3050 (21 9)    Urine (mL/kg/hr)  675    Blood  901    Total Output  1576    Net  +1474                Height and Weights   Height: 5' 6" (167 6 cm)     Body mass index is 49 32 kg/m²  Weight (last 2 days)     Date/Time Weight    12/06/22 0244 139 (305 56)    12/05/22 1807 --    Comment rows:    OBSERV: Pt to CT scan via stretcher at 12/05/22 1807 12/05/22 1800 --    Comment rows:    OBSERV: rapid response team at bedside, at 12/05/22 1800 12/05/22 1756 --    Comment rows:    OBSERV: replaced O2 NC and increased rate to 5L, pt coherant and awake after stimulation, alert and oriented  Dr Carolyn Montoya and Dr Gayatri Garcia at bedside to evaluate  at 12/05/22 1756 12/05/22 1755 --    Comment rows:    OBSERV: called into room by , pt found semi fowlers, gasping, incoherant, diaphoretic and nasal canula off, called rapid response  at 12/05/22 1755 12/05/22 0834 141 (311)            Nutrition       Diet Orders   (From admission, onward)             Start     Ordered    12/06/22 0331  Diet Regular; Regular House  Diet effective now        References:    Nutrtion Support Algorithm Enteral vs  Parenteral   Question Answer Comment   Diet Type Regular    Regular Regular House    RD to adjust diet per protocol?  No        12/06/22 0330                  Active Medications  Scheduled Meds:  Current Facility-Administered Medications   Medication Dose Route Frequency Provider Last Rate   • acetaminophen  650 mg Oral Q6H [address] Minerva Mckeon MD     • calcium gluconate  2 g Intravenous Once Aubrey G Wilson 2 g (12/06/22 0305)   • docusate sodium  100 mg Oral BID Minerva Mckeon MD     • HYDROmorphone  0 5 mg Intravenous Q2H PRN Minerva Mckeon MD     • ibuprofen  600 mg Oral Q6H PRN Minerva Mckeon MD     • ketorolac  30 mg Intravenous Q6H PRN Minerva Mckeon MD     • lactated ringers  125 mL/hr Intravenous Continuous Minerva Mckeon  mL/hr (12/05/22 2109)   • magnesium sulfate  4 g Intravenous Once Nobles Lolling     • nalbuphine  5 mg Intravenous Q3H PRN Minerva Mckeon MD     • naloxone  0 1 mg Intravenous Q3 min PRN Minerva Mckeon MD • ondansetron  4 mg Intravenous Q8H PRN Ed Marina MD     • oxyCODONE  5 mg Oral Q4H PRN Ed Marina MD      Or   • oxyCODONE  10 mg Oral Q4H PRN Ed Marina MD     • simethicone  80 mg Oral 4x Daily PRN Ed Marina MD       Continuous Infusions:  lactated ringers, 125 mL/hr, Last Rate: 125 mL/hr (12/05/22 2109)      PRN Meds:   HYDROmorphone, 0 5 mg, Q2H PRN  ibuprofen, 600 mg, Q6H PRN  ketorolac, 30 mg, Q6H PRN  nalbuphine, 5 mg, Q3H PRN  naloxone, 0 1 mg, Q3 min PRN  ondansetron, 4 mg, Q8H PRN  oxyCODONE, 5 mg, Q4H PRN   Or  oxyCODONE, 10 mg, Q4H PRN  simethicone, 80 mg, 4x Daily PRN        Invasive Devices Review  Invasive Devices     Peripheral Intravenous Line  Duration           Peripheral IV 12/05/22 Left;Ventral (anterior) Hand <1 day    Peripheral IV 12/05/22 Right Antecubital <1 day          Drain  Duration           Urethral Catheter Double-lumen;Non-latex 16 Fr  <1 day                Rationale for remaining devices: nicolas can be d/c today    ---------------------------------------------------------------------------------------  Advance Directive and Living Will:      Power of :    POLST:    ---------------------------------------------------------------------------------------  Care Time Delivered:   No Critical Care time spent       [facility]      Portions of the record may have been created with voice recognition software  Occasional wrong word or "sound a like" substitutions may have occurred due to the inherent limitations of voice recognition software    Read the chart carefully and recognize, using context, where substitutions have occurred

## 2022-12-06 NOTE — ASSESSMENT & PLAN NOTE
· mildly tachycardic s/p , became more tachycardic just prior to syncopal episode to 130's  · denies chest pain or shortness of breath, denies history of cardiac problems or palpitations      · 2/2 to hemorrhage, improved after OR

## 2022-12-06 NOTE — ASSESSMENT & PLAN NOTE
Lab Results   Component Value Date    HGBA1C 5 0 09/23/2022       Recent Labs     12/05/22  1012   POCGLU 67       Blood Sugar Average: Last 72 hrs:  (P) 67     · diet controlled  · no indication for accuchecks or sliding scale insulin per Dr Mary Apodaca  · will follow up for GTT at 6 weeks post-partum

## 2022-12-06 NOTE — PLAN OF CARE
Problem: PAIN - ADULT  Goal: Verbalizes/displays adequate comfort level or baseline comfort level  Description: Interventions:  - Encourage patient to monitor pain and request assistance  - Assess pain using appropriate pain scale  - Administer analgesics based on type and severity of pain and evaluate response  - Implement non-pharmacological measures as appropriate and evaluate response  - Consider cultural and social influences on pain and pain management  - Notify physician/advanced practitioner if interventions unsuccessful or patient reports new pain  Outcome: Progressing     Problem: INFECTION - ADULT  Goal: Absence or prevention of progression during hospitalization  Description: INTERVENTIONS:  - Assess and monitor for signs and symptoms of infection  - Monitor lab/diagnostic results  - Monitor all insertion sites, i e  indwelling lines, tubes, and drains  - Monitor endotracheal if appropriate and nasal secretions for changes in amount and color  - [place] appropriate cooling/warming therapies per order  - Administer medications as ordered  - Instruct and encourage patient and family to use good hand hygiene technique  - Identify and instruct in appropriate isolation precautions for identified infection/condition  Outcome: Progressing  Goal: Absence of fever/infection during neutropenic period  Description: INTERVENTIONS:  - Monitor WBC    Outcome: Progressing     Problem: SAFETY ADULT  Goal: Patient will remain free of falls  Description: INTERVENTIONS:  - Educate patient/family on patient safety including physical limitations  - Instruct patient to call for assistance with activity   - Consult OT/PT to assist with strengthening/mobility   - Keep Call bell within reach  - Keep bed low and locked with side rails adjusted as appropriate  - Keep care items and personal belongings within reach  - Initiate and maintain comfort rounds    Outcome: Progressing  Goal: Maintain or return to baseline ADL function  Description: INTERVENTIONS:  -  Assess patient's ability to carry out ADLs; assess patient's baseline for ADL function and identify physical deficits which impact ability to perform ADLs (bathing, care of mouth/teeth, toileting, grooming, dressing, etc )  - Assess/evaluate cause of self-care deficits   - Assess range of motion  - Assess patient's mobility; develop plan if impaired  - Assess patient's need for assistive devices and provide as appropriate  - Encourage maximum independence but intervene and supervise when necessary  - Involve family in performance of ADLs  - Assess for home care needs following discharge   - Consider OT consult to assist with ADL evaluation and planning for discharge  - Provide patient education as appropriate  Outcome: Progressing  Goal: Maintains/Returns to pre admission functional level  Description: INTERVENTIONS:  - Perform BMAT or MOVE assessment daily    - Set and communicate daily mobility goal to care team and patient/family/caregiver     - Collaborate with rehabilitation services on mobility goals if consulted  Outcome: Progressing     Problem: DISCHARGE PLANNING  Goal: Discharge to home or other facility with appropriate resources  Description: INTERVENTIONS:  - Identify barriers to discharge w/patient and caregiver  - Arrange for needed discharge resources and transportation as appropriate  - Identify discharge learning needs (meds, wound care, etc )  - Arrange for interpretive services to assist at discharge as needed  - Refer to Case Management Department for coordinating discharge planning if the patient needs post-hospital services based on physician/advanced practitioner order or complex needs related to functional status, cognitive ability, or social support system  Outcome: Progressing     Problem: Knowledge Deficit  Goal: Patient/family/caregiver demonstrates understanding of disease process, treatment plan, medications, and discharge instructions  Description: Complete learning assessment and assess knowledge base    Interventions:  - Provide teaching at level of understanding  - Provide teaching via preferred learning methods  Outcome: Progressing     Problem: POSTPARTUM  Goal: Experiences normal postpartum course  Description: INTERVENTIONS:  - Monitor maternal vital signs  - Assess uterine involution and lochia  Outcome: Progressing  Goal: Appropriate maternal -  bonding  Description: INTERVENTIONS:  - Identify family support  - Assess for appropriate maternal/infant bonding   -Encourage maternal/infant bonding opportunities  - Referral to  or  as needed  Outcome: Progressing  Goal: Establishment of infant feeding pattern  Description: INTERVENTIONS:  - Assess breast/bottle feeding  - Refer to lactation as needed  Outcome: Progressing  Goal: Incision(s), wounds(s) or drain site(s) healing without S/S of infection  Description: INTERVENTIONS  - Assess and document dressing, incision, wound bed, drain sites and surrounding tissue  - Provide patient and family education  Outcome: Progressing     Problem: MOBILITY - ADULT  Goal: Maintain or return to baseline ADL function  Description: INTERVENTIONS:  -  Assess patient's ability to carry out ADLs; assess patient's baseline for ADL function and identify physical deficits which impact ability to perform ADLs (bathing, care of mouth/teeth, toileting, grooming, dressing, etc )  - Assess/evaluate cause of self-care deficits   - Assess range of motion  - Assess patient's mobility; develop plan if impaired  - Assess patient's need for assistive devices and provide as appropriate  - Encourage maximum independence but intervene and supervise when necessary  - Involve family in performance of ADLs  - Assess for home care needs following discharge   - Consider OT consult to assist with ADL evaluation and planning for discharge  - Provide patient education as appropriate  Outcome: Progressing  Goal: Maintains/Returns to pre admission functional level  Description: INTERVENTIONS:  - Perform BMAT or MOVE assessment daily    - Set and communicate daily mobility goal to care team and patient/family/caregiver     - Collaborate with rehabilitation services on mobility goals if consulted  Outcome: Progressing     Problem: Prexisting or High Potential for Compromised Skin Integrity  Goal: Skin integrity is maintained or improved  Description: INTERVENTIONS:  - Identify patients at risk for skin breakdown  - Assess and monitor skin integrity  - Assess and monitor nutrition and hydration status  - Monitor labs   - Assess for incontinence   - Turn and reposition patient  - Assist with mobility/ambulation  - Relieve pressure over bony prominences  - Avoid friction and shearing  - Provide appropriate hygiene as needed including keeping skin clean and dry  - Evaluate need for skin moisturizer/barrier cream  - Collaborate with interdisciplinary team   - Patient/family teaching  - Consider wound care consult   Outcome: Progressing     Problem: Potential for Falls  Goal: Patient will remain free of falls  Description: INTERVENTIONS:  - Educate patient/family on patient safety including physical limitations  - Instruct patient to call for assistance with activity   - Consult OT/PT to assist with strengthening/mobility   - Keep Call bell within reach  - Keep bed low and locked with side rails adjusted as appropriate  - Keep care items and personal belongings within reach  - Initiate and maintain comfort rounds    Outcome: Progressing

## 2022-12-06 NOTE — PROGRESS NOTES
[facility]  Progress Note - [facility] 1995, 28 y o  female MRN: [id number]  Unit/Bed#: ICU 11 Encounter: [id number]  Primary Care Provider: Cheko Braga MD   Date and time admitted to hospital: 2022  8:02 AM    * S/P repeat low transverse   Assessment & Plan  · uncomplicated RLTCS at 56T6A 2022 by Dr Adrien Cabrera  · EBL 901mL  · is breastfeeding, pump at bedside  · OB following, appreciate recs  · pitocin drip d/c'ed by OB   · hold lovenox until 2022 per Dr Adrien Cabrera  · Deras management per OB      39 weeks gestation of pregnancy  Assessment & Plan  · A1GDM this pregnancy  · s/p uncomplicated RTLCS   · post-surgical care per OB    Hemoperitoneum  Assessment & Plan  · Pt was brought to OR for repeat exploration given R rectus sheath hematoma with extension into peritoneum  · Intra-op EBL 1L clotted blood in abdomen, given 1u PRBCs and 1g TXA in the OR    Hypoxia  Assessment & Plan  · became mildly hypoxic 3-4 hours after  on , also became increasingly tachycardic and tachypnic  · denies shortness of breath, chest pain, nausea, or vomiting  · no history of lung disease or personal or family cardiac or clotting history  · placed on 2L NC with improvement in SpO2 to 97%  · trial off oxygen in ICU resulted in patient de-satting to 91-92% on room air at rest  · CT PE study without evidence of PE or lung abnormality      · continue to monitor cardiorespiratory status, wean off oxygen as tolerated      Syncope  Assessment & Plan  · has history of syncope with prodrome, last episode , has never been evaluated for same  · syncope occurs both while active and at rest, is preceeded by vision, hearing loss, and lightheadedness  · became tachycardic, tachypnic, and mildly hypoxic several hours prior to syncopal episode  · similar episode this afternoon while laying in bed  · episode resolved within 30 seconds, noted to be diaphoretic afterwards · denies any complaints at this time  · CT PE study without evidence of PE      · ECG w/ no acute abnormalities  · CT abd/pelvis demonstrated hemoperitoneum with rectus sheath hematoma   · Repeat CBC in AM, stable post transfusion at 7 8  · monitor telemetry for evidence of arrhythmia   · consider echocardiogram      Tachycardia  Assessment & Plan  · mildly tachycardic s/p , became more tachycardic just prior to syncopal episode to 130's  · denies chest pain or shortness of breath, denies history of cardiac problems or palpitations      · 2/2 to hemorrhage, improved after OR       Diet controlled gestational diabetes mellitus (GDM) in third trimester  Assessment & Plan  Lab Results   Component Value Date    HGBA1C 5 0 2022       Recent Labs     22  1012   POCGLU 67       Blood Sugar Average: Last 72 hrs:  (P) 67     · diet controlled  · no indication for accuchecks or sliding scale insulin per Dr Christiano Alex  · will follow up for GTT at 6 weeks post-partum    Maternal morbid obesity in third trimester, antepartum ([facility] [address] )  Assessment & Plan  - 4 lbs total weight gain this pregnancy  -consider nutritional and exercise counseling      ----------------------------------------------------------------------------------------  HPI/24hr events:   27 yo F who is now POD 1 C/S and POD 1 take back ex-lap evacuation of hemoperitoneum and rectus sheath hematoma    {Patient appropriate for transfer out of the ICU today?:77226}  Disposition: {CC Dispo Plan:09703}  Code Status: Level 1 - Full Code  ---------------------------------------------------------------------------------------  SUBJECTIVE  ***    Review of Systems  {Critical Care ROS Daily Progress Note  (Optional):99837}  ---------------------------------------------------------------------------------------  OBJECTIVE    Vitals   Vitals:    22 0132 22 0244 225 22   BP: 108/59 103/57 (!) 88/65 92/55   BP Location:  Right arm     Pulse: 89 89 92 100   Resp: 16 14 14 (!) 23   Temp:  98 2 °F (36 8 °C)     TempSrc:  Oral     SpO2: 97% 97% 96% 94%   Weight:  (!) 139 kg (305 lb 8 9 oz)     Height:         Temp (24hrs), Av 3 °F (36 8 °C), Min:97 °F (36 1 °C), Max:99 8 °F (37 7 °C)  Current: Temperature: 98 2 °F (36 8 °C)          Respiratory:  {SL IP ICU Oxygen Therapy:66444}  Nasal Cannula O2 Flow Rate (L/min): 2 L/min    Invasive/non-invasive ventilation settings   Respiratory    Lab Data (Last 4 hours)    None         O2/Vent Data (Last 4 hours)    None                Physical Exam        Laboratory and Diagnostics:  Results from last 7 days   Lab Units 22  1644 22  0840   WBC Thousand/uL 13 92* 16 59* 18 18* 11 65*   HEMOGLOBIN g/dL 7 8* 8 5* 10 6* 11 1*   HEMATOCRIT % 24 6* 27 0* 32 5* 34 3*   PLATELETS Thousands/uL 208 289 277 264   NEUTROS PCT %  --  90*  --   --    MONOS PCT %  --  3*  --   --      Results from last 7 days   Lab Units 22  00222  1718   SODIUM mmol/L 136 135 136   POTASSIUM mmol/L 4 3 4 2 3 8   CHLORIDE mmol/L 106 104 105   CO2 mmol/L    ANION GAP mmol/L 8 9 9   BUN mg/dL 9 10 9   CREATININE mg/dL 0 52* 0 42* 0 41*   CALCIUM mg/dL 7 7* 7 9* 8 4   GLUCOSE RANDOM mg/dL 134 136 158*   ALT U/L  --   --  31   AST U/L  --   --  19   ALK PHOS U/L  --   --  141*   ALBUMIN g/dL  --   --  2 9*   TOTAL BILIRUBIN mg/dL  --   --  0 34     Results from last 7 days   Lab Units 22  00222   MAGNESIUM mg/dL 1 4* 1 4*   PHOSPHORUS mg/dL 3 6 4 5      Results from last 7 days   Lab Units 22   INR  0 95   PTT seconds 26          Results from last 7 days   Lab Units 22  0021 22  1922   LACTIC ACID mmol/L 1 8 1 3     ABG:    VBG:          Micro        EKG: ***  Imaging: {Results Review Statement:77643}    Intake and Output  I/O        07 07 07    I V  (mL/kg)  2500 (18)    Blood 350    IV Piggyback  200    Total Intake(mL/kg)  3050 (21 9)    Urine (mL/kg/hr)  675    Blood  901    Total Output  1576    Net  +1474                Height and Weights   Height: 5' 6" (167 6 cm)     Body mass index is 49 32 kg/m²  Weight (last 2 days)     Date/Time Weight    12/06/22 0244 139 (305 56)    12/05/22 1807 --    Comment rows:    OBSERV: Pt to CT scan via stretcher at 12/05/22 1807 12/05/22 1800 --    Comment rows:    OBSERV: rapid response team at bedside, at 12/05/22 1800 12/05/22 1756 --    Comment rows:    OBSERV: replaced O2 NC and increased rate to 5L, pt coherant and awake after stimulation, alert and oriented  Dr Nell Gosselin and Dr Ayla Hemphill at bedside to evaluate  at 12/05/22 1756 12/05/22 1755 --    Comment rows:    OBSERV: called into room by , pt found semi fowlers, gasping, incoherant, diaphoretic and nasal canula off, called rapid response  at 12/05/22 1755 12/05/22 0834 141 (311)            Nutrition       Diet Orders   (From admission, onward)             Start     Ordered    12/05/22 2030  Diet NPO  Diet effective now        References:    Nutrtion Support Algorithm Enteral vs  Parenteral   Question Answer Comment   Diet Type NPO    RD to adjust diet per protocol?  Yes        12/05/22 2029                  Active Medications  Scheduled Meds:  Current Facility-Administered Medications   Medication Dose Route Frequency Provider Last Rate   • acetaminophen  650 mg Oral Q6H [address] Evelyne Brown MD     • calcium gluconate  2 g Intravenous Once Aubrey G Wilson 2 g (12/06/22 0305)   • docusate sodium  100 mg Oral BID Evelyne Brown MD     • HYDROmorphone  0 5 mg Intravenous Q2H PRN Evelyne Brown MD     • ibuprofen  600 mg Oral Q6H PRN Evelyne Brown MD     • ketorolac  30 mg Intravenous Q6H PRN Evelyne Brown MD     • lactated ringers  125 mL/hr Intravenous Continuous Evelyne Brown  mL/hr (12/05/22 2109)   • magnesium sulfate  4 g Intravenous Once Enid Killer     • nalbuphine  5 mg Intravenous Q3H PRN Zehra Mercer MD     • naloxone  0 1 mg Intravenous Q3 min PRN Zehra Mercer MD     • ondansetron  4 mg Intravenous Q8H PRN Zehra Mercer MD     • oxyCODONE  5 mg Oral Q4H PRN Zehra Mercer MD      Or   • oxyCODONE  10 mg Oral Q4H PRN Zehra Mercer MD     • simethicone  80 mg Oral 4x Daily PRN Zehra Mercer MD       Continuous Infusions:  lactated ringers, 125 mL/hr, Last Rate: 125 mL/hr (12/05/22 2109)      PRN Meds:   HYDROmorphone, 0 5 mg, Q2H PRN  ibuprofen, 600 mg, Q6H PRN  ketorolac, 30 mg, Q6H PRN  nalbuphine, 5 mg, Q3H PRN  naloxone, 0 1 mg, Q3 min PRN  ondansetron, 4 mg, Q8H PRN  oxyCODONE, 5 mg, Q4H PRN   Or  oxyCODONE, 10 mg, Q4H PRN  simethicone, 80 mg, 4x Daily PRN        Invasive Devices Review  Invasive Devices     Peripheral Intravenous Line  Duration           Peripheral IV 12/05/22 Left;Ventral (anterior) Hand <1 day    Peripheral IV 12/05/22 Right Antecubital <1 day          Drain  Duration           Urethral Catheter Double-lumen;Non-latex 16 Fr  <1 day                Rationale for remaining devices: ***  ---------------------------------------------------------------------------------------  Advance Directive and Living Will:      Power of :    POLST:    ---------------------------------------------------------------------------------------  Care Time Delivered:   {Critical Care Time Delivered:50189}      Terry Dorantes PA-C      Portions of the record may have been created with voice recognition software  Occasional wrong word or "sound a like" substitutions may have occurred due to the inherent limitations of voice recognition software    Read the chart carefully and recognize, using context, where substitutions have occurred

## 2022-12-06 NOTE — LACTATION NOTE
This note was copied from a baby's chart  Mom returned from ICU this afternoon  Concerned baby is not getting any milk from her, states she pumped and saw drops only  Discussed how baby is best for removing colostrum  Assisted Mom  to place baby skin to skin in football hold  Discussed importance of alignment of baby's ear, shoulder, and hip in any preferred position  Worked on supporting baby at breast level and beginning the feed with baby's nose arriving at the nipple  Then, using areolar compression while guiding baby chin-forward to the breast to achieve a deep, comfortable latch  Baby latches well at this time, wide latch and rocker suck observed, swallows noted   Enc Mom to cont demand feedings  Reviewed signs of effective breastfeeding: audible swallows, strong but comfortable tugging while latched, breasts softening (after milk comes in), baby falling asleep and releasing the breast, and meeting daily diaper goals  Consult placed for Zomee pump

## 2022-12-06 NOTE — ANESTHESIA POSTPROCEDURE EVALUATION
Post-Op Assessment Note    CV Status:  Stable  Pain Score: 0    Pain management: adequate     Mental Status:  Alert and awake   Hydration Status:  Euvolemic   PONV Controlled:  Controlled   Airway Patency:  Patent      Post Op Vitals Reviewed: Yes      Staff: CRNA         No notable events documented      BP   102/60   Temp  97 6   Pulse 88   Resp   16   SpO2   98%

## 2022-12-06 NOTE — ASSESSMENT & PLAN NOTE
-uncomplicated RLTCS at 13O8K 12/05/2022 by Dr Nasreen Dia  -EBL 901mL  -is breastfeeding, pump at bedside  -OB following  -pitocin drip d/c'ed by OB     -hold lovenox until 12/06/2022 per Dr Nasreen Dia  -monitor H/H, blood loss  -d/c valenzuela in am  -pain control per OB

## 2022-12-06 NOTE — PROGRESS NOTES
Progress Note - OB/GYN   Denise Cleveland 28 y o  female MRN: [id number]  Unit/Bed#: ICU 11 Encounter: [id number]    ASSESSMENT:  Denise Cleveland is a 28 y o   female, POD#1 s/p scheduled repeat low transverse  section at 39w1d, subsequently complicate but symptomatic acute blood loss anemia and ex-lap for rectus hematoma and 1L of hemoperitoneum  Today, clinically stable post op  Blood pressures still soft but not symptomatic  PLAN:  * S/P repeat low transverse   Assessment & Plan  S/p scheduled RLTCS ,  cc   Subsequent re-exploration for 1L hemoperitoneum and rectus hematoma, evacuated; wash out completed  No active bleeding seen   Monitored in ICU overnight s/p rapid response, persistent tachycardia   Clinically stable this morning; will continue to closely monitor in ICU this morning   Deras out, void trial   Encourage ambulation/OOB today  12 pm labs per ICU  Type and cross; transfuse < 7   Encouraged patient to start pumping   Other routine post op care     Diet controlled gestational diabetes mellitus (GDM) in third trimester  Assessment & Plan  Lab Results   Component Value Date    HGBA1C 5 0 2022       Recent Labs     22  1012   POCGLU 67       Blood Sugar Average: Last 72 hrs:  (P) 67     Will need glucose testing postpartum    Maternal morbid obesity in third trimester, antepartum ([facility] [address] )  Assessment & Plan  BMI 50  DVT ppx: SCDs; consider pharmacologic anticoagulation once Hgb stable      Disposition: remain inpatient; consider transfer back to postpartum later today if remains stable    SUBJECTIVE:  Pt feels well  She has no major complaints  Was able to sleep overnight  She is tolerating minor PO, has not yet eaten solid food  Felt some right shoulder/chest pain yesterday, improving today s/p evacuation of hemoperitoneum  She denies chest pain, SOB, dizziness, lightheadedness, leg pain, nausea, vomiting  She has not yet started pumping, but is interested  OBJECTIVE:  Vitals:    12/06/22 0305 12/06/22 0310 12/06/22 0405 12/06/22 0505   BP: (!) 88/65 92/55 102/63 100/54   BP Location:       Pulse: 92 100 95 88   Resp: 14 18 22 22   Temp:       TempSrc:       SpO2: 96% 94% 97% 95%   Weight:       Height:           BP range last 24:  Systolic (07JGV), WOU:191 , Min:88 , ZHO:920   Diastolic (38AVV), NMK:05, Min:43, Max:79      Physical Exam:   Body mass index is 49 32 kg/m²  Physical Exam  Vitals reviewed  Constitutional:       General: She is not in acute distress  Appearance: She is obese  She is ill-appearing  She is not diaphoretic  HENT:      Head: Normocephalic and atraumatic  Eyes:      Conjunctiva/sclera: Conjunctivae normal    Cardiovascular:      Rate and Rhythm: Normal rate  Pulmonary:      Effort: Pulmonary effort is normal  No respiratory distress  Abdominal:      General: There is no distension  Palpations: Abdomen is soft  Tenderness: There is no abdominal tenderness  Comments: Pfannenstiel incision c/d/i; healing well; surgical glue in place  Genitourinary:     Comments: Urinary catheter in place, draining clear yellow urine  Musculoskeletal:         General: No swelling, tenderness or deformity  Normal range of motion  Cervical back: Normal range of motion  Comments: SCDs in place   Skin:     General: Skin is warm and dry  Capillary Refill: Capillary refill takes less than 2 seconds  Neurological:      General: No focal deficit present  Mental Status: She is alert and oriented to person, place, and time  Mental status is at baseline  Psychiatric:         Mood and Affect: Mood normal          Behavior: Behavior normal          Thought Content: Thought content normal          I/O       12/04 0701 12/05 0700 12/05 0701 12/06 0700 12/06 0701 12/07 0700    P  O   480     I V  (mL/kg)  3356 3 (24 1)     Blood  350     IV Piggyback  400     Total Intake(mL/kg)  4586 3 (33)     Urine (mL/kg/hr) 1650     Blood  901     Total Output  2551     Net  +2035 3                  Results:  Recent Labs     12/06/22  0021 12/06/22  0437   WBC 13 92* 14 67*   HGB 7 8* 7 1*   HCT 24 6* 22 1*    209       Recent Labs     12/05/22  1718 12/05/22  1922 12/06/22  0021   K 3 8 4 2 4 3   CREATININE 0 41* 0 42* 0 52*   AST 19  --   --    ALT 31  --   --        Recent Results (from the past 24 hour(s))   Type and screen    Collection Time: 12/05/22  8:40 AM   Result Value Ref Range    ABO Grouping A     Rh Factor Positive     Antibody Screen Negative     Specimen Expiration Date [id number]    CBC    Collection Time: 12/05/22  8:40 AM   Result Value Ref Range    WBC 11 65 (H) 4 31 - 10 16 Thousand/uL    RBC 4 15 3 81 - 5 12 Million/uL    Hemoglobin 11 1 (L) 11 5 - 15 4 g/dL    Hematocrit 34 3 (L) 34 8 - 46 1 %    MCV 83 82 - 98 fL    MCH 26 7 (L) 26 8 - 34 3 pg    MCHC 32 4 31 4 - 37 4 g/dL    RDW 14 1 11 6 - 15 1 %    Platelets 698 758 - 565 Thousands/uL    MPV 11 0 8 9 - 12 7 fL   Fingerstick Glucose (POCT)    Collection Time: 12/05/22 10:12 AM   Result Value Ref Range    POC Glucose 67 65 - 140 mg/dl   CORD, Blood gas, venous    Collection Time: 12/05/22 11:12 AM   Result Value Ref Range    pH, Cord Arturo 7 365 7 190 - 7 490    pCO2, Cord Arturo 44 5 (H) 27 0 - 43 0 mm HG    pO2, Cord Arturo 29 6 15 0 - 45 0 mm HG    HCO3, Cord Arturo 24 9 12 2 - 28 6 mmol/L    Base Exc, Cord Arturo -0 8 (L) 1 0 - 9 0 mmol/L    O2 Cont, Cord Arturo 15 8 mL/dL    O2 HGB,VENOUS CORD 73 0 %   CBC and Platelet    Collection Time: 12/05/22  4:44 PM   Result Value Ref Range    WBC 18 18 (H) 4 31 - 10 16 Thousand/uL    RBC 3 88 3 81 - 5 12 Million/uL    Hemoglobin 10 6 (L) 11 5 - 15 4 g/dL    Hematocrit 32 5 (L) 34 8 - 46 1 %    MCV 84 82 - 98 fL    MCH 27 3 26 8 - 34 3 pg    MCHC 32 6 31 4 - 37 4 g/dL    RDW 14 0 11 6 - 15 1 %    Platelets 786 293 - 669 Thousands/uL    MPV 11 3 8 9 - 12 7 fL   Comprehensive metabolic panel    Collection Time: 12/05/22  5:18 PM Result Value Ref Range    Sodium 136 135 - 147 mmol/L    Potassium 3 8 3 5 - 5 3 mmol/L    Chloride 105 96 - 108 mmol/L    CO2 22 21 - 32 mmol/L    ANION GAP 9 4 - 13 mmol/L    BUN 9 5 - 25 mg/dL    Creatinine 0 41 (L) 0 60 - 1 30 mg/dL    Glucose 158 (H) 65 - 140 mg/dL    Calcium 8 4 8 4 - 10 2 mg/dL    Corrected Calcium 9 3 8 3 - 10 1 mg/dL    AST 19 13 - 39 U/L    ALT 31 7 - 52 U/L    Alkaline Phosphatase 141 (H) 34 - 104 U/L    Total Protein 5 9 (L) 6 4 - 8 4 g/dL    Albumin 2 9 (L) 3 5 - 5 0 g/dL    Total Bilirubin 0 34 0 20 - 1 00 mg/dL    eGFR 141 ml/min/1 73sq m   Basic metabolic panel    Collection Time: 12/05/22  7:22 PM   Result Value Ref Range    Sodium 135 135 - 147 mmol/L    Potassium 4 2 3 5 - 5 3 mmol/L    Chloride 104 96 - 108 mmol/L    CO2 22 21 - 32 mmol/L    ANION GAP 9 4 - 13 mmol/L    BUN 10 5 - 25 mg/dL    Creatinine 0 42 (L) 0 60 - 1 30 mg/dL    Glucose 136 65 - 140 mg/dL    Calcium 7 9 (L) 8 4 - 10 2 mg/dL    eGFR 139 ml/min/1 73sq m   Magnesium    Collection Time: 12/05/22  7:22 PM   Result Value Ref Range    Magnesium 1 4 (L) 1 9 - 2 7 mg/dL   Protime-INR    Collection Time: 12/05/22  7:22 PM   Result Value Ref Range    Protime 12 9 11 6 - 14 5 seconds    INR 0 95 0 84 - 1 19   Phosphorus    Collection Time: 12/05/22  7:22 PM   Result Value Ref Range    Phosphorus 4 5 2 7 - 4 5 mg/dL   CBC and differential    Collection Time: 12/05/22  7:22 PM   Result Value Ref Range    WBC 16 59 (H) 4 31 - 10 16 Thousand/uL    RBC 3 23 (L) 3 81 - 5 12 Million/uL    Hemoglobin 8 5 (L) 11 5 - 15 4 g/dL    Hematocrit 27 0 (L) 34 8 - 46 1 %    MCV 84 82 - 98 fL    MCH 26 3 (L) 26 8 - 34 3 pg    MCHC 31 5 31 4 - 37 4 g/dL    RDW 14 0 11 6 - 15 1 %    MPV 11 5 8 9 - 12 7 fL    Platelets 821 031 - 694 Thousands/uL    nRBC 0 /100 WBCs    Neutrophils Relative 90 (H) 43 - 75 %    Immat GRANS % 1 0 - 2 %    Lymphocytes Relative 6 (L) 14 - 44 %    Monocytes Relative 3 (L) 4 - 12 %    Eosinophils Relative 0 0 - 6 % Basophils Relative 0 0 - 1 %    Neutrophils Absolute 14 95 (H) 1 85 - 7 62 Thousands/µL    Immature Grans Absolute 0 10 0 00 - 0 20 Thousand/uL    Lymphocytes Absolute 0 94 0 60 - 4 47 Thousands/µL    Monocytes Absolute 0 57 0 17 - 1 22 Thousand/µL    Eosinophils Absolute 0 00 0 00 - 0 61 Thousand/µL    Basophils Absolute 0 03 0 00 - 0 10 Thousands/µL   Lactic acid, plasma    Collection Time: 12/05/22  7:22 PM   Result Value Ref Range    LACTIC ACID 1 3 0 5 - 2 0 mmol/L   APTT    Collection Time: 12/05/22  7:22 PM   Result Value Ref Range    PTT 26 23 - 37 seconds   HS Troponin 0hr (reflex protocol)    Collection Time: 12/05/22  7:22 PM   Result Value Ref Range    hs TnI 0hr 4 "Refer to ACS Flowchart"- see link ng/L   Fibrinogen    Collection Time: 12/05/22  7:22 PM   Result Value Ref Range    Fibrinogen 598 (H) 227 - 495 mg/dL   Calcium, ionized    Collection Time: 12/05/22  7:57 PM   Result Value Ref Range    Calcium, Ionized 1 09 (L) 1 12 - 1 32 mmol/L   B-Type Natriuretic Peptide(BNP)    Collection Time: 12/05/22  7:57 PM   Result Value Ref Range    BNP 39 0 - 100 pg/mL   Basic metabolic panel    Collection Time: 12/06/22 12:21 AM   Result Value Ref Range    Sodium 136 135 - 147 mmol/L    Potassium 4 3 3 5 - 5 3 mmol/L    Chloride 106 96 - 108 mmol/L    CO2 22 21 - 32 mmol/L    ANION GAP 8 4 - 13 mmol/L    BUN 9 5 - 25 mg/dL    Creatinine 0 52 (L) 0 60 - 1 30 mg/dL    Glucose 134 65 - 140 mg/dL    Calcium 7 7 (L) 8 4 - 10 2 mg/dL    eGFR 130 ml/min/1 73sq m   Calcium, ionized    Collection Time: 12/06/22 12:21 AM   Result Value Ref Range    Calcium, Ionized 1 04 (L) 1 12 - 1 32 mmol/L   Magnesium    Collection Time: 12/06/22 12:21 AM   Result Value Ref Range    Magnesium 1 4 (L) 1 9 - 2 7 mg/dL   Phosphorus    Collection Time: 12/06/22 12:21 AM   Result Value Ref Range    Phosphorus 3 6 2 7 - 4 5 mg/dL   Lactic acid, plasma    Collection Time: 12/06/22 12:21 AM   Result Value Ref Range    LACTIC ACID 1 8 0 5 - 2 0 mmol/L   CBC    Collection Time: 12/06/22 12:21 AM   Result Value Ref Range    WBC 13 92 (H) 4 31 - 10 16 Thousand/uL    RBC 2 84 (L) 3 81 - 5 12 Million/uL    Hemoglobin 7 8 (L) 11 5 - 15 4 g/dL    Hematocrit 24 6 (L) 34 8 - 46 1 %    MCV 87 82 - 98 fL    MCH 27 5 26 8 - 34 3 pg    MCHC 31 7 31 4 - 37 4 g/dL    RDW 13 8 11 6 - 15 1 %    Platelets 163 914 - 022 Thousands/uL    MPV 11 2 8 9 - 12 7 fL   CBC    Collection Time: 12/06/22  4:37 AM   Result Value Ref Range    WBC 14 67 (H) 4 31 - 10 16 Thousand/uL    RBC 2 61 (L) 3 81 - 5 12 Million/uL    Hemoglobin 7 1 (L) 11 5 - 15 4 g/dL    Hematocrit 22 1 (L) 34 8 - 46 1 %    MCV 85 82 - 98 fL    MCH 27 2 26 8 - 34 3 pg    MCHC 32 1 31 4 - 37 4 g/dL    RDW 13 9 11 6 - 15 1 %    Platelets 831 391 - 286 Thousands/uL    MPV 11 2 8 9 - 12 7 fL   Prepare Leukoreduced RBC: 2 Units    Collection Time: 12/06/22  5:59 AM   Result Value Ref Range    Unit Product Code F1706O14     Unit Number [id number]     Unit ABO A     Unit [facility]     Crossmatch Compatible     Unit Dispense Status Crossmatched     Unit Product Volume 350 ml    Unit Product Code Q6487T65     Unit Number [id number]     Unit ABO A     Unit [facility]     Crossmatch Compatible     Unit Dispense Status Crossmatched     Unit Product Volume 350 ml   Prepare Leukoreduced RBC: 2 Units    Collection Time: 12/06/22  6:04 AM   Result Value Ref Range    Unit Product Code A3934E59     Unit Number [id number]     Unit ABO A     Unit [facility]     Crossmatch Compatible     Unit Dispense Status Crossmatched     Unit Product Volume 350 ml    Unit Product Code R9204O67     Unit Number [id number]     Unit ABO A     Unit [facility]     Crossmatch Compatible     Unit Dispense Status Presumed Trans     Unit Product Volume 350 ml       Joni Vuong MD  PGY-4, OB/GYN  12/6/2022 7:01 AM

## 2022-12-06 NOTE — ASSESSMENT & PLAN NOTE
· became mildly hypoxic 3-4 hours after  on , also became increasingly tachycardic and tachypnic  · denies shortness of breath, chest pain, nausea, or vomiting  · no history of lung disease or personal or family cardiac or clotting history  · placed on 2L NC with improvement in SpO2 to 97%  · trial off oxygen in ICU resulted in patient de-satting to 91-92% on room air at rest  · CT PE study without evidence of PE or lung abnormality      · continue to monitor cardiorespiratory status, wean off oxygen as tolerated

## 2022-12-06 NOTE — PLAN OF CARE
Problem: PAIN - ADULT  Goal: Verbalizes/displays adequate comfort level or baseline comfort level  Description: Interventions:  - Encourage patient to monitor pain and request assistance  - Assess pain using appropriate pain scale  - Administer analgesics based on type and severity of pain and evaluate response  - Implement non-pharmacological measures as appropriate and evaluate response  - Consider cultural and social influences on pain and pain management  - Notify physician/advanced practitioner if interventions unsuccessful or patient reports new pain  Outcome: Progressing     Problem: INFECTION - ADULT  Goal: Absence or prevention of progression during hospitalization  Description: INTERVENTIONS:  - Assess and monitor for signs and symptoms of infection  - Monitor lab/diagnostic results  - Monitor all insertion sites, i e  indwelling lines, tubes, and drains  - Monitor endotracheal if appropriate and nasal secretions for changes in amount and color  - [place] appropriate cooling/warming therapies per order  - Administer medications as ordered  - Instruct and encourage patient and family to use good hand hygiene technique  - Identify and instruct in appropriate isolation precautions for identified infection/condition  Outcome: Progressing  Goal: Absence of fever/infection during neutropenic period  Description: INTERVENTIONS:  - Monitor WBC    Outcome: Progressing     Problem: SAFETY ADULT  Goal: Patient will remain free of falls  Description: INTERVENTIONS:  - Educate patient/family on patient safety including physical limitations  - Instruct patient to call for assistance with activity   - Consult OT/PT to assist with strengthening/mobility   - Keep Call bell within reach  - Keep bed low and locked with side rails adjusted as appropriate  - Keep care items and personal belongings within reach  - Initiate and maintain comfort rounds    Outcome: Progressing  Goal: Maintain or return to baseline ADL function  Description: INTERVENTIONS:  -  Assess patient's ability to carry out ADLs; assess patient's baseline for ADL function and identify physical deficits which impact ability to perform ADLs (bathing, care of mouth/teeth, toileting, grooming, dressing, etc )  - Assess/evaluate cause of self-care deficits   - Assess range of motion  - Assess patient's mobility; develop plan if impaired  - Assess patient's need for assistive devices and provide as appropriate  - Encourage maximum independence but intervene and supervise when necessary  - Involve family in performance of ADLs  - Assess for home care needs following discharge   - Consider OT consult to assist with ADL evaluation and planning for discharge  - Provide patient education as appropriate  Outcome: Progressing  Goal: Maintains/Returns to pre admission functional level  Description: INTERVENTIONS:  - Perform BMAT or MOVE assessment daily    - Set and communicate daily mobility goal to care team and patient/family/caregiver     - Collaborate with rehabilitation services on mobility goals if consulted  Outcome: Progressing     Problem: DISCHARGE PLANNING  Goal: Discharge to home or other facility with appropriate resources  Description: INTERVENTIONS:  - Identify barriers to discharge w/patient and caregiver  - Arrange for needed discharge resources and transportation as appropriate  - Identify discharge learning needs (meds, wound care, etc )  - Arrange for interpretive services to assist at discharge as needed  - Refer to Case Management Department for coordinating discharge planning if the patient needs post-hospital services based on physician/advanced practitioner order or complex needs related to functional status, cognitive ability, or social support system  Outcome: Progressing     Problem: Knowledge Deficit  Goal: Patient/family/caregiver demonstrates understanding of disease process, treatment plan, medications, and discharge instructions  Description: Complete learning assessment and assess knowledge base    Interventions:  - Provide teaching at level of understanding  - Provide teaching via preferred learning methods  Outcome: Progressing     Problem: POSTPARTUM  Goal: Experiences normal postpartum course  Description: INTERVENTIONS:  - Monitor maternal vital signs  - Assess uterine involution and lochia  Outcome: Progressing  Goal: Appropriate maternal -  bonding  Description: INTERVENTIONS:  - Identify family support  - Assess for appropriate maternal/infant bonding   -Encourage maternal/infant bonding opportunities  - Referral to  or  as needed  Outcome: Progressing  Goal: Establishment of infant feeding pattern  Description: INTERVENTIONS:  - Assess breast/bottle feeding  - Refer to lactation as needed  Outcome: Progressing  Goal: Incision(s), wounds(s) or drain site(s) healing without S/S of infection  Description: INTERVENTIONS  - Assess and document dressing, incision, wound bed, drain sites and surrounding tissue  - Provide patient and family education  Outcome: Progressing     Problem: MOBILITY - ADULT  Goal: Maintain or return to baseline ADL function  Description: INTERVENTIONS:  -  Assess patient's ability to carry out ADLs; assess patient's baseline for ADL function and identify physical deficits which impact ability to perform ADLs (bathing, care of mouth/teeth, toileting, grooming, dressing, etc )  - Assess/evaluate cause of self-care deficits   - Assess range of motion  - Assess patient's mobility; develop plan if impaired  - Assess patient's need for assistive devices and provide as appropriate  - Encourage maximum independence but intervene and supervise when necessary  - Involve family in performance of ADLs  - Assess for home care needs following discharge   - Consider OT consult to assist with ADL evaluation and planning for discharge  - Provide patient education as appropriate  Outcome: Progressing  Goal: Maintains/Returns to pre admission functional level  Description: INTERVENTIONS:  - Perform BMAT or MOVE assessment daily    - Set and communicate daily mobility goal to care team and patient/family/caregiver     - Collaborate with rehabilitation services on mobility goals if consulted  Outcome: Progressing     Problem: Prexisting or High Potential for Compromised Skin Integrity  Goal: Skin integrity is maintained or improved  Description: INTERVENTIONS:  - Identify patients at risk for skin breakdown  - Assess and monitor skin integrity  - Assess and monitor nutrition and hydration status  - Monitor labs   - Assess for incontinence   - Turn and reposition patient  - Assist with mobility/ambulation  - Relieve pressure over bony prominences  - Avoid friction and shearing  - Provide appropriate hygiene as needed including keeping skin clean and dry  - Evaluate need for skin moisturizer/barrier cream  - Collaborate with interdisciplinary team   - Patient/family teaching  - Consider wound care consult   Outcome: Progressing

## 2022-12-06 NOTE — OP NOTE
OPERATIVE REPORT  PATIENT NAME: Macey Gomez    :  1995  MRN: [id number]  Pt Location: AN OR ROOM 03    SURGERY DATE: 2022    Surgeon(s) and Role:     Cameron Silva MD - Primary     * Dorie Singh MD - Assisting    Preop Diagnosis:  Previous  section complicating pregnancy [P80 556]  Syncope [R55]  S/P repeat low transverse  [Z98 891]  Tachycardia [R00 0]    Post-Op Diagnosis Codes:     * Previous  section complicating pregnancy [O33 465]     * Syncope [R55]     * S/P repeat low transverse  [G07 404]     * Tachycardia [R00 0]    Procedure(s) (LRB):  LAPAROTOMY EXPLORATORY, EVACUATION OF HEMOPERITONEUM (N/A)    Specimens: None    Estimated Blood Loss:   - Surgically none  - Hemoperitoneum approximately 1L evacuated    Drains:  Urethral Catheter Double-lumen;Non-latex 16 Fr  (Active)   Reasons to continue Urinary Catheter  Post-operative urological requirements 22 1356   Goal for Removal Other (Comment) 22 1356   Site Assessment Clean;Skin intact 22 1215   Deras Care Done 22 1356   Collection Container Standard drainage bag 22 1356   Securement Method Securing device (Describe) 22 1356   Output (mL) 600 mL 22 1615   Number of days: 0       Anesthesia Type:  General ET    Operative Indications:  S/p RTLCS   Persistent tachycardia  S/p syncopal episode  Concern for hemoperitoneum on CT scan    Procedure Details   The patient was seen prior to the procedure  Risks, benefits, possible complications, alternate treatment options, and expected outcomes were discussed with the patient  The patient agreed with the proposed plan and gave informed consent for a exploratory laparotomy and all other indicated procedures - likely evacuation of hemoperitoneum  The patient was taken to the main Operating Room where she received general  anesthesia  For infection prophylaxis, she received 3g Ancef preoperatively   Patient had Deras catheter from procedure this morning  SCDs were turned on in the OR  Patient was placed in dorsal lithotomy  The abdomen was prepped with Chloraprep, the vagina was prepped with chlorhexidine, and following appropriate drying time, the patient was draped in the usual sterile manner  A Time Out was held and the above information confirmed  The previously placed exofin was removed  The prior pfannenstiel incision was reopened and skin and subcutaneous suture was removed  Fascia was tented in the midline and Chavira scissors were used to cut suture which was subsequently removed from the fascia  The right rectus muscle was noted to have clot adherent to the surface which was removed without evidence of active bleeding  Dark blood was noted to pool from the underside of the muscle and omentum  Muscle was noted to be hemostatic but with evidence on the right side of significant clot  Omentum was systematically withdrawn and clots were removed from the surface without evidence of bleeding directly from the fascia  Clots were evacuated from in front of and above the uterus  Right paracolic gutter was explored and found to have significant clot which was removed  Minimal clot was noted in the left paracolic gutter  After clearing immediately available clots, the [place]    Starting with the uterus, we made a systematic sweep of the peritoneal cavity to assess for active bleeding  After finding no evidence of active extravasation, we turned our attention to continue to evacuate hemoperitoneum and ensure hemostasis  The uterine incision was inspected and noted to be intact without evidence of active bleeding  After uterine serosa was disturbed with lap sponge, the lower serosal edge was noted to ooze, therefore out of abundance of cautions, hysterotomy was oversewn with running locked suture of 0-Vicryl  Hemostasis was again ensured   Omentum was again inspected and noted to be without evidence of active bleeding  Both paracolic gutters were inspected and cleared of all clots  Uterus was flexed anteriorly and posterior culdesac was cleared of all clots  No evidence of active intraperitoneal bleeding was found  1U PRBC was administered secondary to amount of clot extracted and concern for equilibration  TXA 1g was also administered intraoperatively  Normal saline solution was used to irrigate the entire abdomen  Approximately 5L total was used for irritation of the peritoneal cavity and pre-fascial spaces  Saline solution was noted to be clear at end of irrigation and without evidence of active bleeding  Narciso retractor was removed and abdomen was irrigated again without evidence of bleeding  Rectus muscles were inspected and copiously irrigated  There was no active bleeding  Suspicion continued for rectus muscle bleed that was since hemostatic  Fascia was inspected and noted to be hemostatic  After irrigation concluded, hysterotomy was covered with Surgifoam and rectus muscles were coated in surgical powder with continued hemostasis  The fascia was then closed with a running suture of looped 0 PDS from both sides, meeting in the middle  Subcutaneous adipose tissue was closed with a running suture of 3-0 Vicryl in 2 layers  The skin was closed with a subcuticular running suture of 4-0 Monocryl  Exofin was applied  The patient appeared to tolerate the procedure very well  Lap sponge, needle, and instrument counts were correct x2  The patient was transferred back to the ICU in stable condition  Nguyen Araya MD  OB/GYN  12/6/2022  1:01 AM

## 2022-12-06 NOTE — PROGRESS NOTES
CT PE negative for PE  Patient still persistently tachycardic with evidence of anemia with Hgb 8 5 on CBC at 1922 down from 10 6 at 1644  CTAP significant for "small to moderate volume ascites with mostly dependent hyperdensity, particularly within the right paracolic gutter  Small foci of pneumoperitoneum under the right hemidiaphragm, likely postsurgical   Blood products along the   right anterior lower abdominal wall may be within the right rectus sheath " Abdominal exam continues to be benign and patient remains subjectively asymptomatic  However, unable to exclude continued active bleeding at this time  Therefore, decision has been made to proceed with exploratory laparotomy  Informed consent was obtained after discussing the risks and benefits of a take back procedure  Anesthesia and OR staff aware and we will proceed as soon as possible  Sharon Aiken MD  OB/GYN  12/5/22   8:50PM

## 2022-12-06 NOTE — ASSESSMENT & PLAN NOTE
Lab Results   Component Value Date    HGBA1C 5 0 09/23/2022       Recent Labs     12/05/22  1012   POCGLU 67       Blood Sugar Average: Last 72 hrs:  (P) 67     -diet controlled  -no indication for accuchecks or sliding scale insulin per Dr Victoriano Ramirez  -will follow up for GTT at 6 weeks post-partum

## 2022-12-06 NOTE — UTILIZATION REVIEW
Initial Clinical Review    Admission: Date/Time/Statement:   Admission Orders (From admission, onward)     Ordered        22 0826  Inpatient Admission  Once                      Orders Placed This Encounter   Procedures   • Inpatient Admission     Standing Status:   Standing     Number of Occurrences:   1     Order Specific Question:   Level of Care     Answer:   Med Surg [16]     Order Specific Question:   Estimated length of stay     Answer:   More than 2 Midnights     Order Specific Question:   Certification     Answer:   I certify that inpatient services are medically necessary for this patient for a duration of greater than two midnights  See H&P and MD Progress Notes for additional information about the patient's course of treatment  Chief Complaint   Patient presents with   • Scheduled        Initial Presentation: 28 y o  female , presented to [facility], Admission,  at 39w0d admitted for RLTCS  Admitted as Inpatient due to a Plan for RLTCS as scheduled  Date: 2022  Surgery Date: 2022  Procedure:   Repeat low transverse  section  Anesthesia: Spinal  Maternal Findings:  Normal uterus  Normal tubes and ovaries bilaterally  No adhesions  No difficulty noted from skin to delivery    Findings:  Viable female weighing 6lbs 15oz; Apgar scores of 9 at one minute and 9 at five minutes  Clear amniotic fluid  Normal placenta with 3-vessel cord    Called into room by , pt found semi fowlers, gasping, incoherant, diaphoretic and nasal canula off, called rapid response  at 22 1755,  O2 replaced and increased to %L via NC  Reporting feeling lightheaded while sitting in bed  Next remembers suddenly waking up and everyone around her  -CT PE study, CT A/p  with normal postsurgical ascites and Hemo pneumoperitoneum  H/H (monitor & Trend) - stable , CMP WNL, pending BNP, trop, and coags  Obtain ECG    Cardio-Pulmonary-monitor for evidence of arrhythmia  SURGERY DATE: 2022  Procedure(s) (LRB):  LAPAROTOMY EXPLORATORY, EVACUATION OF HEMOPERITONEUM (N/A)  Anesthesia Type:  General ET   Est Blood Loss:  Hemoperitoneum approximately 1L evacuated  • given 1u PRBCs and 1g TXA in the OR    Day 2: 2022   POD #1,  s/p scheduled repeat low transverse  section at 39w1d, subsequently complicate but symptomatic acute blood loss anemia and ex-lap for rectus hematoma and 1L of hemoperitoneum  Today, clinically stable post op  Blood pressures still soft  Continue IV flds  Cardio-Pulmonary Monitoring  Monitor H/H         Triage Vitals   Temperature Pulse Respirations Blood Pressure SpO2   22 0834 22 0834 22 0834 22 0834 22 1213   98 6 °F (37 °C) (!) 113 16 130/79 96 %      Temp Source Heart Rate Source Patient Position - Orthostatic VS BP Location FiO2 (%)   22 0834 22 0834 22 0834 22 0834 --   Oral Monitor Lying Left arm       Pain Score       22 0834       No Pain          Wt Readings from Last 1 Encounters:   22 (!) 138 kg (305 lb)     Additional Vital Signs:   Date/Time Temp Pulse Resp BP MAP (mmHg) SpO2 Calculated FIO2 (%) - Nasal Cannula Nasal Cannula O2 Flow Rate (L/min) O2 Device Cardiac (WDL) Patient Position - Orthostatic VS   22 0818 98 4 °F (36 9 °C) 99 18 98/54 73 99 % -- -- Nasal cannula -- Lying   22 0505 -- 88 22 100/54 72 95 % -- -- -- -- --   22 0405 -- 95 22 102/63 78 97 % -- -- -- -- --   22 0310 -- 100 18 92/55 66 94 % -- -- -- -- --   22 0305 -- 92 14 88/65 Abnormal  72 96 % -- -- -- -- --   22 0244 98 2 °F (36 8 °C) 89 14 103/57 75 97 % -- -- Nasal cannula -- Lying   22 0132 -- 89 16 108/59 75 97 % -- -- -- -- --   22 0102 -- 97 17 107/58 76 97 % -- -- -- -- --   22 0031 -- 95 16 108/60 77 97 % -- -- -- -- --   22 0015 -- 97 15 118/69 86 97 % -- -- -- -- --   22 0000 -- 105 18 117/63 83 97 % 28 2 L/min Nasal cannula -- Lying   12/05/22 2345 -- 123 Abnormal  18 134/64 89 97 % -- -- -- -- --   12/05/22 2330 98 1 °F (36 7 °C) 113 Abnormal  18 124/72 92 96 % 28 2 L/min Nasal cannula -- Lying   12/05/22 2059 -- -- -- 106/52 -- -- -- -- -- -- --   12/05/22 2057 97 °F (36 1 °C) Abnormal  137 Abnormal  20 106/74 -- 100 % 28 2 L/min Nasal cannula -- --   12/05/22 1807 -- -- -- -- -- -- -- -- -- -- --   Comment rows:   OBSERV: Pt to CT scan via stretcher at 12/05/22 1807 12/05/22 1803 97 7 °F (36 5 °C) 150 Abnormal  -- 127/72 -- 96 % -- -- -- -- --   12/05/22 1800 98 8 °F (37 1 °C) 127 Abnormal  22 118/59 -- 95 % 28 2 L/min Nasal cannula -- --   Comment rows:   OBSERV: rapid response team at bedside, at 12/05/22 1800 12/05/22 1756 -- -- -- -- -- -- -- -- -- -- --   Comment rows:   OBSERV: replaced O2 NC and increased rate to 5L, pt coherant and awake after stimulation, alert and oriented  Dr Judson Garner and Dr Julius Restrepo at bedside to evaluate  at 12/05/22 1756 12/05/22 1755 -- -- -- -- -- -- -- -- -- -- --   Comment rows:   OBSERV: called into room by , pt found semi fowlers, gasping, incoherant, diaphoretic and nasal canula off followed by syncope,rapid response called   at 12/05/22 1755 12/05/22 1658 98 5 °F (36 9 °C) 133 Abnormal  18 109/72 -- 96 % -- -- Nasal cannula -- Sitting   12/05/22 1645 -- 140 Abnormal  -- -- -- 95 % 28 2 L/min Nasal cannula -- --   12/05/22 1610 -- -- -- -- -- -- 28 2 L/min Nasal cannula -- --   12/05/22 1600 98 1 °F (36 7 °C) 125 Abnormal   18 120/59 -- 96 % -- -- None (Room air) -- Sitting   Pulse: nurse notified at 12/05/22 1600   12/05/22 1500 -- 112 Abnormal  -- -- -- -- -- -- -- -- --   12/05/22 1458 99 8 °F (37 7 °C)  118 Abnormal   18 132/60 -- 97 % -- -- None (Room air) -- Sitting   Temp: notified nurse at 12/05/22 1458   Pulse: notified nurse at 12/05/22 1458   12/05/22 1450 -- -- -- -- -- 93 % -- -- None (Room air) -- --   12/05/22 1356 98 8 °F (37 1 °C) 106 Abnormal  18 125/71 -- 98 % -- -- None (Room air) -- Lying   12/05/22 1330 -- 97 -- 97/51 69 99 % -- -- -- -- --   12/05/22 1321 -- 108 Abnormal  -- -- -- 99 % -- -- -- -- --   12/05/22 1320 -- 106 Abnormal  -- -- -- 99 % -- -- -- -- --   12/05/22 1319 -- 92 -- -- -- 100 % -- -- -- -- --   12/05/22 1318 -- 108 Abnormal  -- -- -- 99 % -- -- -- -- --   12/05/22 1317 -- 109 Abnormal  -- -- -- 98 % -- -- -- -- --   12/05/22 1316 -- 102 -- 109/58 79 99 % -- -- -- -- --   12/05/22 1315 -- 107 Abnormal  -- -- -- 98 % -- -- -- -- --   12/05/22 1314 -- 103 -- -- -- 98 % -- -- -- -- --   12/05/22 1313 -- 100 -- -- -- 98 % -- -- -- -- --   12/05/22 1312 -- 105 -- -- -- 97 % -- -- -- -- --   12/05/22 1311 -- 107 Abnormal  -- -- -- 98 % -- -- -- -- --   12/05/22 1310 -- 109 Abnormal  -- -- -- 98 % -- -- -- -- --   12/05/22 1309 -- 96 -- -- -- 98 % -- -- -- -- --   12/05/22 1308 -- 103 -- -- -- 98 % -- -- -- -- --   12/05/22 1307 -- 100 -- -- -- 98 % -- -- -- -- --   12/05/22 1306 -- 98 -- -- -- 98 % -- -- -- -- --   12/05/22 1305 -- 91 -- -- -- 97 % -- -- -- -- --   12/05/22 1304 -- 91 -- -- -- 97 % -- -- -- -- --   12/05/22 1303 -- 95 -- -- -- 99 % -- -- -- -- --   12/05/22 1302 -- 95 -- -- -- 99 % -- -- -- -- --   12/05/22 1301 -- 93 -- -- -- 98 % -- -- -- -- --   12/05/22 1300 -- 88 -- 93/43 Abnormal  62 Abnormal  98 % -- -- -- -- --   12/05/22 1259 -- 90 -- -- -- 99 % -- -- -- -- --   12/05/22 1258 -- 88 -- -- -- 98 % -- -- -- -- --   12/05/22 1257 -- 88 -- -- -- 97 % -- -- -- -- --   12/05/22 1256 -- 88 -- -- -- 98 % -- -- -- -- --   12/05/22 1255 -- 92 -- -- -- 98 % -- -- -- -- --   12/05/22 1254 -- 88 -- -- -- 98 % -- -- -- -- --   12/05/22 1253 -- 86 -- -- -- 98 % -- -- -- -- --   12/05/22 1252 -- 88 -- -- -- 98 % -- -- -- -- --   12/05/22 1251 -- 91 -- -- -- 99 % -- -- -- -- --   12/05/22 1250 -- 87 -- -- -- 99 % -- -- -- -- --   12/05/22 1249 -- 99 -- -- -- 99 % -- -- -- -- --   12/05/22 1248 -- 100 -- -- -- 99 % -- -- -- -- -- 12/05/22 1247 -- 104 -- -- -- 99 % -- -- -- -- --   12/05/22 1246 -- 109 Abnormal  -- -- 85 99 % -- -- -- -- --   12/05/22 1245 -- 108 Abnormal  -- 129/59 -- 99 % -- -- -- -- --   12/05/22 1244 -- 86 -- -- -- 99 % -- -- -- -- --   12/05/22 1243 -- 105 -- -- -- 98 % -- -- -- -- --   12/05/22 1242 -- 104 -- -- -- 99 % -- -- -- -- --   12/05/22 1241 -- 99 -- -- -- 99 % -- -- -- -- --   12/05/22 1240 -- 105 -- -- -- 98 % -- -- -- -- --   12/05/22 1239 -- 102 -- -- -- 99 % -- -- -- -- --   12/05/22 1238 -- 105 -- -- -- 99 % -- -- -- -- --   12/05/22 1237 -- 108 Abnormal  -- -- -- 99 % -- -- -- -- --   12/05/22 1236 -- 109 Abnormal  -- -- -- 98 % -- -- -- -- --   12/05/22 1235 -- 106 Abnormal  -- -- -- 98 % -- -- -- -- --   12/05/22 1234 -- 106 Abnormal  -- -- -- 100 % -- -- -- -- --   12/05/22 1233 -- 109 Abnormal  -- -- -- 100 % -- -- -- -- --   12/05/22 1232 -- 87 -- -- -- 100 % -- -- -- -- --   12/05/22 1231 -- 99 -- -- -- 99 % -- -- -- -- --   12/05/22 1230 -- 101 -- 104/55 75 100 % -- -- -- -- --   12/05/22 1229 -- 105 -- -- -- 99 % -- -- -- -- --   12/05/22 1228 -- 102 -- -- -- 98 % -- -- -- -- --   12/05/22 1227 -- 91 -- -- -- 99 % -- -- -- -- --   12/05/22 1226 -- 98 -- -- -- 100 % -- -- -- -- --   12/05/22 1225 -- 95 -- -- -- 100 % -- -- -- -- --   12/05/22 1224 -- 100 -- -- -- 100 % -- -- -- -- --   12/05/22 1223 -- 94 -- -- -- 99 % -- -- -- -- --   12/05/22 1222 -- 100 -- -- -- 99 % -- -- -- -- --   12/05/22 1221 -- 97 -- -- -- 99 % -- -- -- -- --   12/05/22 1220 -- 104 -- -- -- 98 % -- -- -- -- --   12/05/22 1219 -- 92 -- -- -- 96 % -- -- -- -- --   12/05/22 1218 -- 101 -- -- -- 94 % -- -- -- -- --   12/05/22 1217 -- 105 -- -- -- 95 % -- -- -- -- --   12/05/22 1216 -- 105 -- -- -- 96 % -- -- -- -- --   12/05/22 1215 97 6 °F (36 4 °C) 94 18 105/54 76 96 % -- -- -- WDL --   12/05/22 1214 -- 109 Abnormal  -- -- -- 96 % -- -- -- -- --   12/05/22 1213 -- 109 Abnormal  -- -- -- 96 % -- -- -- -- --     Date and Time Eye Opening Best Verbal Response Best Motor Response Dar Coma Scale Score User   22 0800 4 5 6 15    22 0400 4 5 6 15 AS   22 0000 4 5 6 15 AS   22 2000 4 5 6 15 AS         Pertinent Labs/Diagnostic Test Results:   CT abdomen pelvis w contrast   Final Result by Raman Pantoja MD (2014)   Postsurgical changes of recent  with small to moderate volume hemoperitoneum and possible small right rectus sheath hematoma  Likely surgical material in the cervix  CTA chest pe study   Final Result by Pedro Lopez MD ([id number])   No pulmonary embolus  Lungs clear  Mild postoperative ascites and pneumoperitoneum          Results from last 7 days   Lab Units 22  0437 22  0021 22  19222  1644 22  0840   WBC Thousand/uL 14 67* 13 92* 16 59* 18 18* 11 65*   HEMOGLOBIN g/dL 7 1* 7 8* 8 5* 10 6* 11 1*   HEMATOCRIT % 22 1* 24 6* 27 0* 32 5* 34 3*   PLATELETS Thousands/uL 209 208 289 277 264   NEUTROS ABS Thousands/µL  --   --  14 95*  --   --      Results from last 7 days   Lab Units 22  0021 227 22  1718   SODIUM mmol/L 136  --  135 136   POTASSIUM mmol/L 4 3  --  4 2 3 8   CHLORIDE mmol/L 106  --  104 105   CO2 mmol/L -   22   ANION GAP mmol/L 8  --  9 9   BUN mg/dL 9  --  10 9   CREATININE mg/dL 0 52*  --  0 42* 0 41*   EGFR ml/min/1 73sq m 130  --  139 141   CALCIUM mg/dL 7 7*  --  7 9* 8 4   CALCIUM, IONIZED mmol/L 1 04* 1 09*  --   --    MAGNESIUM mg/dL 1 4*  --  1 4*  --    PHOSPHORUS mg/dL 3 6  --  4 5  --      Results from last 7 days   Lab Units 22  1718   AST U/L 19   ALT U/L 31   ALK PHOS U/L 141*   TOTAL PROTEIN g/dL 5 9*   ALBUMIN g/dL 2 9*   TOTAL BILIRUBIN mg/dL 0 34     Results from last 7 days   Lab Units 22  1012   POC GLUCOSE mg/dl 67     Results from last 7 days   Lab Units 22  0021 22  1922 22  1718   GLUCOSE RANDOM mg/dL 134 136 158* Results from last 7 days   Lab Units 22  192   HS TNI 0HR ng/L 4     Results from last 7 days   Lab Units 22  192   PROTIME seconds 12 9   INR  0 95   PTT seconds 26     Results from last 7 days   Lab Units 22  0021 22   LACTIC ACID mmol/L 1 8 1 3     Results from last 7 days   Lab Units 22   BNP pg/mL 39     Past Medical History:   Diagnosis Date   • Morbid obesity with BMI of 45 0-49 9, adult ([facility] )      Present on Admission:  • Maternal morbid obesity in third trimester, antepartum ([facility] )  • Diet controlled gestational diabetes mellitus (GDM) in third trimester  • History of  delivery, antepartum      Admitting Diagnosis: Previous  section complicating pregnancy [T30 355]  Gestational diabetes [O24 419]  44 weeks gestation of pregnancy [Z3A 39]  Encounter for  delivery without indication [O82]  Maternal morbid obesity in third trimester, antepartum ([facility] ) O99 213, E66 01   Syncope R55   Diet controlled gestational diabetes mellitus (GDM) in third trimester O24 410   39 weeks gestation of pregnancy Z3A 39   History of  delivery, antepartum O34 219    S/P repeat low transverse  Z98 891   Tachycardia R00 0   Hypoxia R09 02   Hemoperitoneum K66 1           Age/Sex: 28 y o  female  Admission Orders:  Dysphagia Assessment > Regular diet  Up w Assistance  Turn q2h  Cardio-Pulmonary Monitoring  Daily weight / I&O  Neuro checks q4h  Monitor Pain, BP, pulse, respirations, level of sedation and O2 sat q4h  Valentin SCDs      Scheduled Medications:  acetaminophen, 650 mg, Oral, Q6H [address]  docusate sodium, 100 mg, Oral, BID      Continuous IV Infusions:  lactated ringers, 125 mL/hr, Intravenous, Continuous      PRN Meds:  HYDROmorphone, 0 5 mg, Intravenous, Q2H PRN  ibuprofen, 600 mg, Oral, Q6H PRN  ketorolac, 30 mg, Intravenous, Q6H PRN  nalbuphine, 5 mg, Intravenous, Q3H PRN  naloxone, 0 1 mg, Intravenous, Q3 min PRN  ondansetron, 4 mg, Intravenous, Q8H PRN  oxyCODONE, 5 mg, Oral, Q4H PRN   Or  oxyCODONE, 10 mg, Oral, Q4H PRN  simethicone, 80 mg, Oral, 4x Daily PRN        IP CONSULT TO CASE MANAGEMENT    Network Utilization Review Department  ATTENTION: Please call with any questions or concerns to [phone] and carefully listen to the prompts so that you are directed to the right person  All voicemails are confidential   Sudie Crigler all requests for admission clinical reviews, approved or denied determinations and any other requests to dedicated fax number below belonging to the campus where the patient is receiving treatment   List of dedicated fax numbers for the Facilities:  [address] DENIALS (Administrative/Medical Necessity) [phone]   [address] (Maternity/NICU/Pediatrics) [phone]   [address] [phone]   [facility] [address] 77 [phone]   [address] [address] [facility] [phone]   1556 [facility] 134 815 [facility] [phone]

## 2022-12-06 NOTE — PROGRESS NOTES
Stanley Dakins  [id number]  12/6/2022  12:54 AM    POST-OP CHECK     S:  Stanley Dakins doing well  Pain "tolerable"  Denies nausea/vomiting  Denies chest pain, shortness of breath  No complaints at this time  O:  Vitals:    12/06/22 0031   BP: 108/60   Pulse: 95   Resp: 16   Temp:    SpO2: 97%       Physical Exam  Vitals reviewed  Constitutional:       General: She is not in acute distress  Appearance: Normal appearance  She is well-developed  She is not ill-appearing, toxic-appearing or diaphoretic  Cardiovascular:      Rate and Rhythm: Normal rate  Pulmonary:      Effort: Pulmonary effort is normal  No respiratory distress  Abdominal:      General: There is no distension  Palpations: Abdomen is soft  There is no mass  Tenderness: There is no abdominal tenderness  There is no guarding or rebound  Comments: Pfannenstiel incision clean, dry and intact with Telfa   Genitourinary:     Comments: Uterus firm at the umbilicus   Skin:     General: Skin is warm and dry  Neurological:      Mental Status: She is alert and oriented to person, place, and time  Psychiatric:         Mood and Affect: Mood normal          Behavior: Behavior normal          A:  Stanley Dakins is s/p exploratory laparotomy with intraoperative findings significant for hemoperitoneum without active bleeding likely originating from the right rectus muscles after RLTCS this morning  Patient given 1UPRBC intraoperatively  We reviewed the operative findings and procedure  P:  Routine postop check  IV/PO pain meds as needed  Regular diet as tolerated  Antiemetics for nausea/vomiting prn  Follow up am labs  Adequate UOP, continue to monitor; Plan for Voiding trial in the AM  Reviewed precautions for postop ileus  SCDs for DVT ppx and will add Lovenox in the AM; encourage ambulation as tolerated  Jg Mckinney MD  OB/GYN  12/6/2022  12:54 AM

## 2022-12-07 LAB
ANION GAP SERPL CALCULATED.3IONS-SCNC: 5 MMOL/L (ref 4–13)
BUN SERPL-MCNC: 10 MG/DL (ref 5–25)
CALCIUM SERPL-MCNC: 7.8 MG/DL (ref 8.4–10.2)
CHLORIDE SERPL-SCNC: 108 MMOL/L (ref 96–108)
CO2 SERPL-SCNC: 25 MMOL/L (ref 21–32)
CREAT SERPL-MCNC: 0.42 MG/DL (ref 0.6–1.3)
ERYTHROCYTE [DISTWIDTH] IN BLOOD BY AUTOMATED COUNT: 14.3 % (ref 11.6–15.1)
ERYTHROCYTE [DISTWIDTH] IN BLOOD BY AUTOMATED COUNT: 14.4 % (ref 11.6–15.1)
GFR SERPL CREATININE-BSD FRML MDRD: 139 ML/MIN/1.73SQ M
GLUCOSE SERPL-MCNC: 95 MG/DL (ref 65–140)
HCT VFR BLD AUTO: 19.7 % (ref 34.8–46.1)
HCT VFR BLD AUTO: 24.3 % (ref 34.8–46.1)
HGB BLD-MCNC: 6.5 G/DL (ref 11.5–15.4)
HGB BLD-MCNC: 7.9 G/DL (ref 11.5–15.4)
MAGNESIUM SERPL-MCNC: 1.6 MG/DL (ref 1.9–2.7)
MCH RBC QN AUTO: 27.8 PG (ref 26.8–34.3)
MCH RBC QN AUTO: 28.4 PG (ref 26.8–34.3)
MCHC RBC AUTO-ENTMCNC: 32.5 G/DL (ref 31.4–37.4)
MCHC RBC AUTO-ENTMCNC: 32.5 G/DL (ref 31.4–37.4)
MCV RBC AUTO: 86 FL (ref 82–98)
MCV RBC AUTO: 87 FL (ref 82–98)
PLATELET # BLD AUTO: 195 THOUSANDS/UL (ref 149–390)
PLATELET # BLD AUTO: 239 THOUSANDS/UL (ref 149–390)
PMV BLD AUTO: 10.7 FL (ref 8.9–12.7)
PMV BLD AUTO: 10.8 FL (ref 8.9–12.7)
POTASSIUM SERPL-SCNC: 3.6 MMOL/L (ref 3.5–5.3)
RBC # BLD AUTO: 2.3 MILLION/UL (ref 3.81–5.12)
RBC # BLD AUTO: 2.78 MILLION/UL (ref 3.81–5.12)
SODIUM SERPL-SCNC: 138 MMOL/L (ref 135–147)
WBC # BLD AUTO: 10.44 THOUSAND/UL (ref 4.31–10.16)
WBC # BLD AUTO: 12.96 THOUSAND/UL (ref 4.31–10.16)

## 2022-12-07 PROCEDURE — 30233N1 TRANSFUSION OF NONAUTOLOGOUS RED BLOOD CELLS INTO PERIPHERAL VEIN, PERCUTANEOUS APPROACH: ICD-10-PCS | Performed by: OBSTETRICS & GYNECOLOGY

## 2022-12-07 RX ORDER — ACETAMINOPHEN 325 MG/1
650 TABLET ORAL EVERY 6 HOURS SCHEDULED
Status: DISCONTINUED | OUTPATIENT
Start: 2022-12-07 | End: 2022-12-08 | Stop reason: HOSPADM

## 2022-12-07 RX ADMIN — DOCUSATE SODIUM 100 MG: 100 CAPSULE, LIQUID FILLED ORAL at 08:14

## 2022-12-07 RX ADMIN — ENOXAPARIN SODIUM 40 MG: 40 INJECTION SUBCUTANEOUS at 08:14

## 2022-12-07 RX ADMIN — OXYCODONE HYDROCHLORIDE 5 MG: 5 TABLET ORAL at 07:13

## 2022-12-07 RX ADMIN — DOCUSATE SODIUM 100 MG: 100 CAPSULE, LIQUID FILLED ORAL at 18:02

## 2022-12-07 RX ADMIN — ACETAMINOPHEN 650 MG: 325 TABLET ORAL at 13:33

## 2022-12-07 RX ADMIN — ACETAMINOPHEN 650 MG: 325 TABLET ORAL at 07:13

## 2022-12-07 RX ADMIN — SIMETHICONE 80 MG: 80 TABLET, CHEWABLE ORAL at 20:50

## 2022-12-07 RX ADMIN — ENOXAPARIN SODIUM 40 MG: 40 INJECTION SUBCUTANEOUS at 20:51

## 2022-12-07 RX ADMIN — IBUPROFEN 600 MG: 600 TABLET, FILM COATED ORAL at 20:51

## 2022-12-07 NOTE — PLAN OF CARE
Problem: PAIN - ADULT  Goal: Verbalizes/displays adequate comfort level or baseline comfort level  Description: Interventions:  - Encourage patient to monitor pain and request assistance  - Assess pain using appropriate pain scale  - Administer analgesics based on type and severity of pain and evaluate response  - Implement non-pharmacological measures as appropriate and evaluate response  - Consider cultural and social influences on pain and pain management  - Notify physician/advanced practitioner if interventions unsuccessful or patient reports new pain  Outcome: Progressing     Problem: INFECTION - ADULT  Goal: Absence or prevention of progression during hospitalization  Description: INTERVENTIONS:  - Assess and monitor for signs and symptoms of infection  - Monitor lab/diagnostic results  - Monitor all insertion sites, i e  indwelling lines, tubes, and drains  - Monitor endotracheal if appropriate and nasal secretions for changes in amount and color  - [place] appropriate cooling/warming therapies per order  - Administer medications as ordered  - Instruct and encourage patient and family to use good hand hygiene technique  - Identify and instruct in appropriate isolation precautions for identified infection/condition  Outcome: Progressing  Goal: Absence of fever/infection during neutropenic period  Description: INTERVENTIONS:  - Monitor WBC    Outcome: Progressing     Problem: SAFETY ADULT  Goal: Patient will remain free of falls  Description: INTERVENTIONS:  - Educate patient/family on patient safety including physical limitations  - Instruct patient to call for assistance with activity   - Consult OT/PT to assist with strengthening/mobility   - Keep Call bell within reach  - Keep bed low and locked with side rails adjusted as appropriate  - Keep care items and personal belongings within reach  - Initiate and maintain comfort rounds    Outcome: Progressing  Goal: Maintain or return to baseline ADL function  Description: INTERVENTIONS:  -  Assess patient's ability to carry out ADLs; assess patient's baseline for ADL function and identify physical deficits which impact ability to perform ADLs (bathing, care of mouth/teeth, toileting, grooming, dressing, etc )  - Assess/evaluate cause of self-care deficits   - Assess range of motion  - Assess patient's mobility; develop plan if impaired  - Assess patient's need for assistive devices and provide as appropriate  - Encourage maximum independence but intervene and supervise when necessary  - Involve family in performance of ADLs  - Assess for home care needs following discharge   - Consider OT consult to assist with ADL evaluation and planning for discharge  - Provide patient education as appropriate  Outcome: Progressing  Goal: Maintains/Returns to pre admission functional level  Description: INTERVENTIONS:  - Perform BMAT or MOVE assessment daily    - Set and communicate daily mobility goal to care team and patient/family/caregiver     - Collaborate with rehabilitation services on mobility goals if consulted  Outcome: Progressing     Problem: DISCHARGE PLANNING  Goal: Discharge to home or other facility with appropriate resources  Description: INTERVENTIONS:  - Identify barriers to discharge w/patient and caregiver  - Arrange for needed discharge resources and transportation as appropriate  - Identify discharge learning needs (meds, wound care, etc )  - Arrange for interpretive services to assist at discharge as needed  - Refer to Case Management Department for coordinating discharge planning if the patient needs post-hospital services based on physician/advanced practitioner order or complex needs related to functional status, cognitive ability, or social support system  Outcome: Progressing     Problem: Knowledge Deficit  Goal: Patient/family/caregiver demonstrates understanding of disease process, treatment plan, medications, and discharge instructions  Description: Complete learning assessment and assess knowledge base    Interventions:  - Provide teaching at level of understanding  - Provide teaching via preferred learning methods  Outcome: Progressing     Problem: POSTPARTUM  Goal: Experiences normal postpartum course  Description: INTERVENTIONS:  - Monitor maternal vital signs  - Assess uterine involution and lochia  Outcome: Progressing  Goal: Appropriate maternal -  bonding  Description: INTERVENTIONS:  - Identify family support  - Assess for appropriate maternal/infant bonding   -Encourage maternal/infant bonding opportunities  - Referral to  or  as needed  Outcome: Progressing  Goal: Establishment of infant feeding pattern  Description: INTERVENTIONS:  - Assess breast/bottle feeding  - Refer to lactation as needed  Outcome: Progressing  Goal: Incision(s), wounds(s) or drain site(s) healing without S/S of infection  Description: INTERVENTIONS  - Assess and document dressing, incision, wound bed, drain sites and surrounding tissue  - Provide patient and family education  - Perform skin care/dressing changes everY  Outcome: Progressing     Problem: MOBILITY - ADULT  Goal: Maintain or return to baseline ADL function  Description: INTERVENTIONS:  -  Assess patient's ability to carry out ADLs; assess patient's baseline for ADL function and identify physical deficits which impact ability to perform ADLs (bathing, care of mouth/teeth, toileting, grooming, dressing, etc )  - Assess/evaluate cause of self-care deficits   - Assess range of motion  - Assess patient's mobility; develop plan if impaired  - Assess patient's need for assistive devices and provide as appropriate  - Encourage maximum independence but intervene and supervise when necessary  - Involve family in performance of ADLs  - Assess for home care needs following discharge   - Consider OT consult to assist with ADL evaluation and planning for discharge  - Provide patient education as appropriate  Outcome: Progressing  Goal: Maintains/Returns to pre admission functional level  Description: INTERVENTIONS:  - Perform BMAT or MOVE assessment daily    - Set and communicate daily mobility goal to care team and patient/family/caregiver     - Collaborate with rehabilitation services on mobility goals if consulted  Outcome: Progressing     Problem: Prexisting or High Potential for Compromised Skin Integrity  Goal: Skin integrity is maintained or improved  Description: INTERVENTIONS:  - Identify patients at risk for skin breakdown  - Assess and monitor skin integrity  - Assess and monitor nutrition and hydration status  - Monitor labs   - Assess for incontinence   - Turn and reposition patient  - Assist with mobility/ambulation  - Relieve pressure over bony prominences  - Avoid friction and shearing  - Provide appropriate hygiene as needed including keeping skin clean and dry  - Evaluate need for skin moisturizer/barrier cream  - Collaborate with interdisciplinary team   - Patient/family teaching  - Consider wound care consult   Outcome: Progressing     Problem: Potential for Falls  Goal: Patient will remain free of falls  Description: INTERVENTIONS:  - Educate patient/family on patient safety including physical limitations  - Instruct patient to call for assistance with activity   - Consult OT/PT to assist with strengthening/mobility   - Keep Call bell within reach  - Keep bed low and locked with side rails adjusted as appropriate  - Keep care items and personal belongings within reach  - Initiate and maintain comfort rounds    Outcome: Progressing

## 2022-12-07 NOTE — UTILIZATION REVIEW
Continued Stay Review    Date: 22                      Current Patient Class: inpatient  Current Level of Care: M/S    HPI:27 y o  female initially admitted on 22    Assessment/Plan: POD # 2 repeat  &  Subsequent re-exploration for 1L hemoperitoneum and rectus hematoma, evacuated; wash out completed  Hgb this am 7 2->6 5, asymptomatic, mild tachycardia,  Transfuse 1 u RBC,  SCD (+) flatus voiding qs, breast/bottle  feeding        Vital Signs:   Date/Time Temp Pulse Resp BP MAP (mmHg) SpO2 Calculated FIO2 (%) - Nasal Cannula Nasal Cannula O2 Flow Rate (L/min) O2 Device Cardiac (WDL) Patient Position - Orthostatic VS   22 1149 98 2 °F (36 8 °C) 107 Abnormal  20 125/82 -- 98 % -- -- None (Room air) -- --   22 0840 98 2 °F (36 8 °C) 104 18 123/64 -- 96 % -- -- None (Room air) -- --   22 0752 98 4 °F (36 9 °C) 106 Abnormal  20 118/73 -- 97 % -- -- -- -- Lying   22 0713 -- 101 -- -- -- -- -- -- None (Room air) X --   22 0415 98 1 °F (36 7 °C) 104 14 108/63 -- 99 % -- -- -- -- Lying   22 0031 98 6 °F (37 °C) 108 Abnormal  18 117/59 -- 98 % -- -- None (Room air) -- Lying   22 2154 -- -- -- -- -- -- -- -- None (Room air) WDL --   22 98 7 °F (37 1 °C) 121 Abnormal   20 99/60 72 96 % -- -- None (Room air) -- Sitting   Pulse: nurse notified at 22 1606 98 6 °F (37 °C) 107 Abnormal   20 114/55 70 98 % -- -- None (Room air) -- Sitting       Pertinent Labs/Diagnostic Results:       Results from last 7 days   Lab Units 22  0436 22  1202 22  0437 22  0021 22  1922 22  1644   WBC Thousand/uL 10 44*  --  14 67* 13 92* 16 59* 18 18*   HEMOGLOBIN g/dL 6 5* 7 2* 7 1* 7 8* 8 5* 10 6*   HEMATOCRIT % 19 7*  --  22 1* 24 6* 27 0* 32 5*   PLATELETS Thousands/uL 195  --  209 208 289 277   NEUTROS ABS Thousands/µL  --   --   --   --  14 95*  --          Results from last 7 days   Lab Units 22  0436 12/06/22  1202 12/06/22  0021 12/05/22 1957 12/05/22 1922 12/05/22  1718   SODIUM mmol/L 138 138 136  --  135 136   POTASSIUM mmol/L 3 6 3 8 4 3  --  4 2 3 8   CHLORIDE mmol/L 108 106 106  --  104 105   CO2 mmol/L 25 27 22  --  22 22   ANION GAP mmol/L 5 5 8  --  9 9   BUN mg/dL 10 7 9  --  10 9   CREATININE mg/dL 0 42* 0 48* 0 52*  --  0 42* 0 41*   EGFR ml/min/1 73sq m 139 133 130  --  139 141   CALCIUM mg/dL 7 8* 8 0* 7 7*  --  7 9* 8 4   CALCIUM, IONIZED mmol/L  --   --  1 04* 1 09*  --   --    MAGNESIUM mg/dL 1 6* 1 8* 1 4*  --  1 4*  --    PHOSPHORUS mg/dL  --   --  3 6  --  4 5  --      Results from last 7 days   Lab Units 12/05/22  1718   AST U/L 19   ALT U/L 31   ALK PHOS U/L 141*   TOTAL PROTEIN g/dL 5 9*   ALBUMIN g/dL 2 9*   TOTAL BILIRUBIN mg/dL 0 34     Results from last 7 days   Lab Units 12/05/22  1012   POC GLUCOSE mg/dl 67     Results from last 7 days   Lab Units 12/07/22  0436 12/06/22  1202 12/06/22  0021 12/05/22 1922 12/05/22 1718   GLUCOSE RANDOM mg/dL 95 113 134 136 158*     Results from last 7 days   Lab Units 12/05/22 1922   HS TNI 0HR ng/L 4         Results from last 7 days   Lab Units 12/05/22 1922   PROTIME seconds 12 9   INR  0 95   PTT seconds 26     Results from last 7 days   Lab Units 12/06/22  0021 12/05/22 1922   LACTIC ACID mmol/L 1 8 1 3     Results from last 7 days   Lab Units 12/05/22 1957   BNP pg/mL 39         Medications:   Scheduled Medications:  acetaminophen, 650 mg, Oral, Q6H [facility]  docusate sodium, 100 mg, Oral, BID  enoxaparin, 40 mg, Subcutaneous, Q12H [facility]      Continuous IV Infusions:     PRN Meds:  HYDROmorphone, 0 5 mg, Intravenous, Q2H PRN  ibuprofen, 600 mg, Oral, Q6H PRN  ondansetron, 4 mg, Intravenous, Q8H PRN  oxyCODONE, 5 mg, Oral, Q4H PRN   Or  oxyCODONE, 10 mg, Oral, Q4H PRN  simethicone, 80 mg, Oral, 4x Daily PRN        Discharge Plan: home when medically stable    Network Utilization Review Department  ATTENTION: Please call with any questions or concerns to [phone] and carefully listen to the prompts so that you are directed to the right person  All voicemails are confidential   Jamila Grand all requests for admission clinical reviews, approved or denied determinations and any other requests to dedicated fax number below belonging to the campus where the patient is receiving treatment   List of dedicated fax numbers for the Facilities:  [address] DENIALS (Administrative/Medical Necessity) [phone]   [address] (Maternity/NICU/Pediatrics) [phone]   1 Payton Smiley [phone]   [facility] [phone]   [address] [address] [facility] [phone]   1558 [facility] Brett Spears [facility], [facility] 134 815 [facility] [phone]

## 2022-12-07 NOTE — PROGRESS NOTES
Progress Note - OB/GYN  Barrett Maldonado 28 y o  female MRN: [id number]  Unit/Bed#: -01 Encounter: [id number]    Assessment and Plan     Barrett Maldonado is a patient of: [facility]   She is PPD# 2 s/p  repeat  section, low transverse incision  Recovering well and is stable       Diet controlled gestational diabetes mellitus (GDM) in third trimester  Assessment & Plan  Lab Results   Component Value Date    HGBA1C 5 0 2022       Recent Labs     22  1012   POCGLU 67       Blood Sugar Average: Last 72 hrs:  (P) 67     Will need glucose testing postpartum    Maternal morbid obesity in third trimester, antepartum ([facility] [address] )  Assessment & Plan  BMI 50  DVT ppx: SCDs; Lovenox 40mg BID    * S/P repeat low transverse   Assessment & Plan  S/p scheduled RLTCS ,  cc   Subsequent re-exploration for 1L hemoperitoneum and rectus hematoma, evacuated; wash out completed  No active bleeding seen   Previously monitored in ICU s/p rapid response and persistent tachycardia   Transferred to postpartum unit   Voiding spontaneously, passing flatus  Encourage ambulation/OOB today  Routine postpartum care  Hgb this am 7 2->6 5, asymptomatic, mild tachycardia, consider transfusion        Disposition    - Anticipate discharge home on PPD# 3/4      Subjective/Objective     Chief Complaint: Postpartum State     Subjective:    Barrett Maldonado is PPD/POD#2 s/p  repeat  section, low transverse incision  She has no current complaints  Pain is well controlled  Patient is currently voiding  She is ambulating  Patient is currently passing flatus and has had no bowel movement  She is tolerating PO, and denies nausea or vomitting  Patient denies fever, chills, chest pain, shortness of breath, or calf tenderness  Lochia is normal  She is  Breastfeeding and Bottle feeding  She is recovering well and is stable         Vitals:   /63 (BP Location: Left arm)   Pulse 104   Temp 98 1 °F (36 7 °C) (Oral) Resp 14   Ht 5' 6" (1 676 m)   Wt (!) 138 kg (305 lb)   LMP 03/06/2022   SpO2 99%   Breastfeeding Yes   BMI 49 23 kg/m²       Intake/Output Summary (Last 24 hours) at 12/7/2022 4662  Last data filed at 12/6/2022 1800  Gross per 24 hour   Intake 243 75 ml   Output 1400 ml   Net -1156 25 ml       Invasive Devices     Peripheral Intravenous Line  Duration           Peripheral IV 12/05/22 Left;Ventral (anterior) Hand 1 day                Physical Exam:   GEN: Mateo Chambers appears well, alert and oriented x 3, pleasant and cooperative   CARDIO: RRR, no murmurs or rubs  RESP:  CTAB, no wheezes or rales  ABDOMEN: soft, no tenderness, no distention, fundus @ 1cm below umbilicus, Incision C/D/I  EXTREMITIES: SCDs on, non tender, no erythema, b/l Sheldon's sign negative      Labs:     Hemoglobin   Date Value Ref Range Status   12/07/2022 6 5 (LL) 11 5 - 15 4 g/dL Final     Comment:     Results verified by repeat  This is an appended report  These results have been appended to a previously preliminary verified report     12/06/2022 7 2 (L) 11 5 - 15 4 g/dL Final   11/22/2014 10 0 (L) 11 5 - 15 4 g/dL Final   11/21/2014 11 8 11 5 - 15 4 g/dL Final     WBC   Date Value Ref Range Status   12/07/2022 10 44 (H) 4 31 - 10 16 Thousand/uL Final   12/06/2022 14 67 (H) 4 31 - 10 16 Thousand/uL Final   11/22/2014 14 55 (H) 4 31 - 10 16 Thousand/uL Final   11/21/2014 11 21 (H) 4 31 - 10 16 Thousand/uL Final     Platelets   Date Value Ref Range Status   12/07/2022 195 149 - 390 Thousands/uL Final   12/06/2022 209 149 - 390 Thousands/uL Final   11/22/2014 194 149 - 390 Thousand/uL Final   11/21/2014 234 149 - 390 Thousand/uL Final     Creatinine   Date Value Ref Range Status   12/07/2022 0 42 (L) 0 60 - 1 30 mg/dL Final     Comment:     Standardized to IDMS reference method   12/06/2022 0 48 (L) 0 60 - 1 30 mg/dL Final     Comment:     Standardized to IDMS reference method     AST   Date Value Ref Range Status   12/05/2022 19 13 - 39 U/L Final     Comment:     Specimen collection should occur prior to Sulfasalazine administration due to the potential for falsely depressed results  09/23/2022 29 5 - 45 U/L Final     Comment:     Specimen collection should occur prior to Sulfasalazine administration due to the potential for falsely depressed results  ALT   Date Value Ref Range Status   12/05/2022 31 7 - 52 U/L Final     Comment:     Specimen collection should occur prior to Sulfasalazine administration due to the potential for falsely depressed results  09/23/2022 60 12 - 78 U/L Final     Comment:     Specimen collection should occur prior to Sulfasalazine administration due to the potential for falsely depressed results             Frandy Bethea MD  12/7/2022  6:11 AM

## 2022-12-07 NOTE — PLAN OF CARE
Problem: PAIN - ADULT  Goal: Verbalizes/displays adequate comfort level or baseline comfort level  Description: Interventions:  - Encourage patient to monitor pain and request assistance  - Assess pain using appropriate pain scale  - Administer analgesics based on type and severity of pain and evaluate response  - Implement non-pharmacological measures as appropriate and evaluate response  - Consider cultural and social influences on pain and pain management  - Notify physician/advanced practitioner if interventions unsuccessful or patient reports new pain  Outcome: Progressing     Problem: INFECTION - ADULT  Goal: Absence or prevention of progression during hospitalization  Description: INTERVENTIONS:  - Assess and monitor for signs and symptoms of infection  - Monitor lab/diagnostic results  - Monitor all insertion sites, i e  indwelling lines, tubes, and drains  - Monitor endotracheal if appropriate and nasal secretions for changes in amount and color  - [place] appropriate cooling/warming therapies per order  - Administer medications as ordered  - Instruct and encourage patient and family to use good hand hygiene technique  - Identify and instruct in appropriate isolation precautions for identified infection/condition  Outcome: Progressing  Goal: Absence of fever/infection during neutropenic period  Description: INTERVENTIONS:  - Monitor WBC    Outcome: Progressing     Problem: SAFETY ADULT  Goal: Patient will remain free of falls  Description: INTERVENTIONS:  - Educate patient/family on patient safety including physical limitations  - Instruct patient to call for assistance with activity   - Consult OT/PT to assist with strengthening/mobility   - Keep Call bell within reach  - Keep bed low and locked with side rails adjusted as appropriate  - Keep care items and personal belongings within reach  - Initiate and maintain comfort rounds    Outcome: Progressing  Goal: Maintain or return to baseline ADL function  Description: INTERVENTIONS:  -  Assess patient's ability to carry out ADLs; assess patient's baseline for ADL function and identify physical deficits which impact ability to perform ADLs (bathing, care of mouth/teeth, toileting, grooming, dressing, etc )  - Assess/evaluate cause of self-care deficits   - Assess range of motion  - Assess patient's mobility; develop plan if impaired  - Assess patient's need for assistive devices and provide as appropriate  - Encourage maximum independence but intervene and supervise when necessary  - Involve family in performance of ADLs  - Assess for home care needs following discharge   - Consider OT consult to assist with ADL evaluation and planning for discharge  - Provide patient education as appropriate  Outcome: Progressing  Goal: Maintains/Returns to pre admission functional level  Description: INTERVENTIONS:  - Perform BMAT or MOVE assessment daily    - Set and communicate daily mobility goal to care team and patient/family/caregiver     - Collaborate with rehabilitation services on mobility goals if consulted  Outcome: Progressing     Problem: DISCHARGE PLANNING  Goal: Discharge to home or other facility with appropriate resources  Description: INTERVENTIONS:  - Identify barriers to discharge w/patient and caregiver  - Arrange for needed discharge resources and transportation as appropriate  - Identify discharge learning needs (meds, wound care, etc )  - Arrange for interpretive services to assist at discharge as needed  - Refer to Case Management Department for coordinating discharge planning if the patient needs post-hospital services based on physician/advanced practitioner order or complex needs related to functional status, cognitive ability, or social support system  Outcome: Progressing     Problem: Knowledge Deficit  Goal: Patient/family/caregiver demonstrates understanding of disease process, treatment plan, medications, and discharge instructions  Description: Complete learning assessment and assess knowledge base    Interventions:  - Provide teaching at level of understanding  - Provide teaching via preferred learning methods  Outcome: Progressing     Problem: POSTPARTUM  Goal: Experiences normal postpartum course  Description: INTERVENTIONS:  - Monitor maternal vital signs  - Assess uterine involution and lochia  Outcome: Progressing  Goal: Appropriate maternal -  bonding  Description: INTERVENTIONS:  - Identify family support  - Assess for appropriate maternal/infant bonding   -Encourage maternal/infant bonding opportunities  - Referral to  or  as needed  Outcome: Progressing  Goal: Establishment of infant feeding pattern  Description: INTERVENTIONS:  - Assess breast/bottle feeding  - Refer to lactation as needed  Outcome: Progressing  Goal: Incision(s), wounds(s) or drain site(s) healing without S/S of infection  Description: INTERVENTIONS  - Assess and document dressing, incision, wound bed, drain sites and surrounding tissue  - Provide patient and family education    Outcome: Progressing     Problem: MOBILITY - ADULT  Goal: Maintain or return to baseline ADL function  Description: INTERVENTIONS:  -  Assess patient's ability to carry out ADLs; assess patient's baseline for ADL function and identify physical deficits which impact ability to perform ADLs (bathing, care of mouth/teeth, toileting, grooming, dressing, etc )  - Assess/evaluate cause of self-care deficits   - Assess range of motion  - Assess patient's mobility; develop plan if impaired  - Assess patient's need for assistive devices and provide as appropriate  - Encourage maximum independence but intervene and supervise when necessary  - Involve family in performance of ADLs  - Assess for home care needs following discharge   - Consider OT consult to assist with ADL evaluation and planning for discharge  - Provide patient education as appropriate  Outcome: Progressing  Goal: Maintains/Returns to pre admission functional level  Description: INTERVENTIONS:  - Perform BMAT or MOVE assessment daily    - Set and communicate daily mobility goal to care team and patient/family/caregiver     - Collaborate with rehabilitation services on mobility goals if consulted  Outcome: Progressing     Problem: Prexisting or High Potential for Compromised Skin Integrity  Goal: Skin integrity is maintained or improved  Description: INTERVENTIONS:  - Identify patients at risk for skin breakdown  - Assess and monitor skin integrity  - Assess and monitor nutrition and hydration status  - Monitor labs   - Assess for incontinence   - Turn and reposition patient  - Assist with mobility/ambulation  - Relieve pressure over bony prominences  - Avoid friction and shearing  - Provide appropriate hygiene as needed including keeping skin clean and dry  - Evaluate need for skin moisturizer/barrier cream  - Collaborate with interdisciplinary team   - Patient/family teaching  - Consider wound care consult   Outcome: Progressing     Problem: Potential for Falls  Goal: Patient will remain free of falls  Description: INTERVENTIONS:  - Educate patient/family on patient safety including physical limitations  - Instruct patient to call for assistance with activity   - Consult OT/PT to assist with strengthening/mobility   - Keep Call bell within reach  - Keep bed low and locked with side rails adjusted as appropriate  - Keep care items and personal belongings within reach  - Initiate and maintain comfort rounds    Outcome: Progressing

## 2022-12-08 VITALS
SYSTOLIC BLOOD PRESSURE: 130 MMHG | HEART RATE: 91 BPM | TEMPERATURE: 98.3 F | HEIGHT: 66 IN | DIASTOLIC BLOOD PRESSURE: 72 MMHG | WEIGHT: 293 LBS | BODY MASS INDEX: 47.09 KG/M2 | OXYGEN SATURATION: 94 % | RESPIRATION RATE: 20 BRPM

## 2022-12-08 DIAGNOSIS — Z98.891 S/P REPEAT LOW TRANSVERSE C-SECTION: ICD-10-CM

## 2022-12-08 LAB
ABO GROUP BLD BPU: NORMAL
BASOPHILS # BLD AUTO: 0.05 THOUSANDS/ÂΜL (ref 0–0.1)
BASOPHILS NFR BLD AUTO: 0 % (ref 0–1)
BPU ID: NORMAL
CROSSMATCH: NORMAL
EOSINOPHIL # BLD AUTO: 0.23 THOUSAND/ÂΜL (ref 0–0.61)
EOSINOPHIL NFR BLD AUTO: 2 % (ref 0–6)
ERYTHROCYTE [DISTWIDTH] IN BLOOD BY AUTOMATED COUNT: 14.3 % (ref 11.6–15.1)
HCT VFR BLD AUTO: 24.3 % (ref 34.8–46.1)
HGB BLD-MCNC: 7.9 G/DL (ref 11.5–15.4)
IMM GRANULOCYTES # BLD AUTO: 0.14 THOUSAND/UL (ref 0–0.2)
IMM GRANULOCYTES NFR BLD AUTO: 1 % (ref 0–2)
LYMPHOCYTES # BLD AUTO: 2.12 THOUSANDS/ÂΜL (ref 0.6–4.47)
LYMPHOCYTES NFR BLD AUTO: 18 % (ref 14–44)
MCH RBC QN AUTO: 28.4 PG (ref 26.8–34.3)
MCHC RBC AUTO-ENTMCNC: 32.5 G/DL (ref 31.4–37.4)
MCV RBC AUTO: 87 FL (ref 82–98)
MONOCYTES # BLD AUTO: 0.67 THOUSAND/ÂΜL (ref 0.17–1.22)
MONOCYTES NFR BLD AUTO: 6 % (ref 4–12)
NEUTROPHILS # BLD AUTO: 8.83 THOUSANDS/ÂΜL (ref 1.85–7.62)
NEUTS SEG NFR BLD AUTO: 73 % (ref 43–75)
NRBC BLD AUTO-RTO: 0 /100 WBCS
PLATELET # BLD AUTO: 252 THOUSANDS/UL (ref 149–390)
PMV BLD AUTO: 10.4 FL (ref 8.9–12.7)
RBC # BLD AUTO: 2.78 MILLION/UL (ref 3.81–5.12)
UNIT DISPENSE STATUS: NORMAL
UNIT PRODUCT CODE: NORMAL
UNIT PRODUCT VOLUME: 350 ML
UNIT RH: NORMAL
WBC # BLD AUTO: 12.04 THOUSAND/UL (ref 4.31–10.16)

## 2022-12-08 RX ORDER — OXYCODONE HYDROCHLORIDE 5 MG/1
5 TABLET ORAL EVERY 4 HOURS PRN
Qty: 14 TABLET | Refills: 0 | Status: SHIPPED | OUTPATIENT
Start: 2022-12-08 | End: 2022-12-18

## 2022-12-08 RX ORDER — OXYCODONE HYDROCHLORIDE 5 MG/1
5 TABLET ORAL EVERY 4 HOURS PRN
Qty: 14 TABLET | Refills: 0 | Status: SHIPPED | OUTPATIENT
Start: 2022-12-08 | End: 2022-12-08 | Stop reason: SDUPTHER

## 2022-12-08 RX ORDER — DOCUSATE SODIUM 100 MG/1
100 CAPSULE, LIQUID FILLED ORAL 2 TIMES DAILY
Qty: 30 CAPSULE | Refills: 0 | Status: SHIPPED | OUTPATIENT
Start: 2022-12-08 | End: 2022-12-08 | Stop reason: SDUPTHER

## 2022-12-08 RX ORDER — IBUPROFEN 600 MG/1
600 TABLET ORAL EVERY 6 HOURS PRN
Qty: 30 TABLET | Refills: 3 | Status: SHIPPED | OUTPATIENT
Start: 2022-12-08 | End: 2022-12-08 | Stop reason: SDUPTHER

## 2022-12-08 RX ORDER — FERROUS SULFATE TAB EC 324 MG (65 MG FE EQUIVALENT) 324 (65 FE) MG
324 TABLET DELAYED RESPONSE ORAL
Qty: 60 TABLET | Refills: 0 | Status: SHIPPED | OUTPATIENT
Start: 2022-12-08 | End: 2022-12-08 | Stop reason: SDUPTHER

## 2022-12-08 RX ORDER — ACETAMINOPHEN 325 MG/1
650 TABLET ORAL EVERY 6 HOURS SCHEDULED
Qty: 30 TABLET | Refills: 3 | Status: SHIPPED | OUTPATIENT
Start: 2022-12-08

## 2022-12-08 RX ORDER — FERROUS SULFATE TAB EC 324 MG (65 MG FE EQUIVALENT) 324 (65 FE) MG
324 TABLET DELAYED RESPONSE ORAL
Qty: 60 TABLET | Refills: 0 | Status: SHIPPED | OUTPATIENT
Start: 2022-12-08

## 2022-12-08 RX ORDER — IBUPROFEN 600 MG/1
600 TABLET ORAL EVERY 6 HOURS PRN
Qty: 30 TABLET | Refills: 3 | Status: SHIPPED | OUTPATIENT
Start: 2022-12-08

## 2022-12-08 RX ORDER — DOCUSATE SODIUM 100 MG/1
100 CAPSULE, LIQUID FILLED ORAL 2 TIMES DAILY
Qty: 30 CAPSULE | Refills: 0 | Status: SHIPPED | OUTPATIENT
Start: 2022-12-08

## 2022-12-08 RX ORDER — ACETAMINOPHEN 325 MG/1
650 TABLET ORAL EVERY 6 HOURS SCHEDULED
Qty: 30 TABLET | Refills: 3
Start: 2022-12-08 | End: 2022-12-08 | Stop reason: SDUPTHER

## 2022-12-08 RX ADMIN — DOCUSATE SODIUM 100 MG: 100 CAPSULE, LIQUID FILLED ORAL at 09:41

## 2022-12-08 RX ADMIN — IBUPROFEN 600 MG: 600 TABLET, FILM COATED ORAL at 09:41

## 2022-12-08 RX ADMIN — ENOXAPARIN SODIUM 40 MG: 40 INJECTION SUBCUTANEOUS at 09:41

## 2022-12-08 RX ADMIN — ACETAMINOPHEN 650 MG: 325 TABLET ORAL at 09:41

## 2022-12-08 NOTE — PROGRESS NOTES
Progress Note - OB/GYN  Eun Mclean 28 y o  female MRN: [id number]  Unit/Bed#: -01 Encounter: [id number]    Assessment and Plan     Eun Mclean is a patient of: [facility]   She is PPD# 3 s/p  repeat  section, low transverse incision  Recovering well and is stable       Diet controlled gestational diabetes mellitus (GDM) in third trimester  Assessment & Plan  Lab Results   Component Value Date    HGBA1C 5 0 2022       Recent Labs     22  1012   POCGLU 67       Blood Sugar Average: Last 72 hrs:  (P) 67     Will need glucose testing postpartum    Maternal morbid obesity in third trimester, antepartum ([facility] [address] )  Assessment & Plan  BMI 50  DVT ppx: SCDs; Lovenox 40mg BID    * S/P repeat low transverse   Assessment & Plan  S/p scheduled RLTCS ,  cc   Subsequent re-exploration for 1L hemoperitoneum and rectus hematoma, evacuated; wash out completed  No active bleeding seen   Previously monitored in ICU s/p rapid response and persistent tachycardia   Transferred to postpartum unit   Voiding spontaneously, passing flatus  Encourage ambulation/OOB today  Routine postpartum care  Hgb this am 7 2->6 5, 1u pRBCs->7 9 post-transfsuion->7 9 this am  Stable, consider discharge        Disposition    - Anticipate discharge home on PPD# 3/4      Subjective/Objective     Chief Complaint: Postpartum State     Subjective:    Eun Mclean is PPD/POD#3 s/p  repeat  section, low transverse incision  She has no current complaints  Pain is well controlled  Patient is currently voiding  She is ambulating  Patient is currently passing flatus and has had no bowel movement  She is tolerating PO, and denies nausea or vomitting  Patient denies fever, chills, chest pain, shortness of breath, or calf tenderness  Lochia is minimal  She is  Breastfeeding  She is recovering well and is stable         Vitals:   /61   Pulse 94   Temp 98 °F (36 7 °C) (Oral)   Resp 20   Ht 5' 6" (1 676 m)   Wt (!) 138 kg (305 lb)   LMP 03/06/2022   SpO2 94%   Breastfeeding Yes   BMI 49 23 kg/m²       Intake/Output Summary (Last 24 hours) at 12/8/2022 5201  Last data filed at 12/7/2022 1149  Gross per 24 hour   Intake 350 ml   Output --   Net 350 ml       Invasive Devices     Peripheral Intravenous Line  Duration           Peripheral IV 12/05/22 Left;Ventral (anterior) Hand 2 days                Physical Exam:   GEN: Tacos Kim appears well, alert and oriented x 3, pleasant and cooperative   CARDIO: RRR, no murmurs or rubs  RESP:  CTAB, no wheezes or rales  ABDOMEN: soft, no tenderness, no distention, fundus @ 1cm below umbilicus, Incision C/D/I  EXTREMITIES: SCDs on, non tender, no erythema, b/l Sheldon's sign negative      Labs:     Hemoglobin   Date Value Ref Range Status   12/08/2022 7 9 (L) 11 5 - 15 4 g/dL Final   12/07/2022 7 9 (L) 11 5 - 15 4 g/dL Final   11/22/2014 10 0 (L) 11 5 - 15 4 g/dL Final   11/21/2014 11 8 11 5 - 15 4 g/dL Final     WBC   Date Value Ref Range Status   12/08/2022 12 04 (H) 4 31 - 10 16 Thousand/uL Final   12/07/2022 12 96 (H) 4 31 - 10 16 Thousand/uL Final   11/22/2014 14 55 (H) 4 31 - 10 16 Thousand/uL Final   11/21/2014 11 21 (H) 4 31 - 10 16 Thousand/uL Final     Platelets   Date Value Ref Range Status   12/08/2022 252 149 - 390 Thousands/uL Final   12/07/2022 239 149 - 390 Thousands/uL Final   11/22/2014 194 149 - 390 Thousand/uL Final   11/21/2014 234 149 - 390 Thousand/uL Final     Creatinine   Date Value Ref Range Status   12/07/2022 0 42 (L) 0 60 - 1 30 mg/dL Final     Comment:     Standardized to IDMS reference method   12/06/2022 0 48 (L) 0 60 - 1 30 mg/dL Final     Comment:     Standardized to IDMS reference method     AST   Date Value Ref Range Status   12/05/2022 19 13 - 39 U/L Final     Comment:     Specimen collection should occur prior to Sulfasalazine administration due to the potential for falsely depressed results      09/23/2022 29 5 - 45 U/L Final Comment:     Specimen collection should occur prior to Sulfasalazine administration due to the potential for falsely depressed results  ALT   Date Value Ref Range Status   12/05/2022 31 7 - 52 U/L Final     Comment:     Specimen collection should occur prior to Sulfasalazine administration due to the potential for falsely depressed results  09/23/2022 60 12 - 78 U/L Final     Comment:     Specimen collection should occur prior to Sulfasalazine administration due to the potential for falsely depressed results             Gianni Robbins MD  12/8/2022  6:22 AM

## 2022-12-08 NOTE — TELEPHONE ENCOUNTER
Pt called stating these prescriptions were sent to wrong pharmacy   Can you please send them to the correct pharmacy - [facility] in [address] [place] [address]

## 2022-12-08 NOTE — PLAN OF CARE
Problem: PAIN - ADULT  Goal: Verbalizes/displays adequate comfort level or baseline comfort level  Description: Interventions:  - Encourage patient to monitor pain and request assistance  - Assess pain using appropriate pain scale  - Administer analgesics based on type and severity of pain and evaluate response  - Implement non-pharmacological measures as appropriate and evaluate response  - Consider cultural and social influences on pain and pain management  - Notify physician/advanced practitioner if interventions unsuccessful or patient reports new pain  Outcome: Progressing     Problem: INFECTION - ADULT  Goal: Absence or prevention of progression during hospitalization  Description: INTERVENTIONS:  - Assess and monitor for signs and symptoms of infection  - Monitor lab/diagnostic results  - Monitor all insertion sites, i e  indwelling lines, tubes, and drains  - Monitor endotracheal if appropriate and nasal secretions for changes in amount and color  - [place] appropriate cooling/warming therapies per order  - Administer medications as ordered  - Instruct and encourage patient and family to use good hand hygiene technique  - Identify and instruct in appropriate isolation precautions for identified infection/condition  Outcome: Progressing  Goal: Absence of fever/infection during neutropenic period  Description: INTERVENTIONS:  - Monitor WBC    Outcome: Progressing     Problem: SAFETY ADULT  Goal: Patient will remain free of falls  Description: INTERVENTIONS:  - Educate patient/family on patient safety including physical limitations  - Instruct patient to call for assistance with activity   - Consult OT/PT to assist with strengthening/mobility   - Keep Call bell within reach  - Keep bed low and locked with side rails adjusted as appropriate  - Keep care items and personal belongings within reach  - Initiate and maintain comfort rounds    Outcome: Progressing  Goal: Maintain or return to baseline ADL function  Description: INTERVENTIONS:  -  Assess patient's ability to carry out ADLs; assess patient's baseline for ADL function and identify physical deficits which impact ability to perform ADLs (bathing, care of mouth/teeth, toileting, grooming, dressing, etc )  - Assess/evaluate cause of self-care deficits   - Assess range of motion  - Assess patient's mobility; develop plan if impaired  - Assess patient's need for assistive devices and provide as appropriate  - Encourage maximum independence but intervene and supervise when necessary  - Involve family in performance of ADLs  - Assess for home care needs following discharge   - Consider OT consult to assist with ADL evaluation and planning for discharge  - Provide patient education as appropriate  Outcome: Progressing  Goal: Maintains/Returns to pre admission functional level  Description: INTERVENTIONS:  - Perform BMAT or MOVE assessment daily    - Set and communicate daily mobility goal to care team and patient/family/caregiver     - Collaborate with rehabilitation services on mobility goals if consulted  Outcome: Progressing     Problem: DISCHARGE PLANNING  Goal: Discharge to home or other facility with appropriate resources  Description: INTERVENTIONS:  - Identify barriers to discharge w/patient and caregiver  - Arrange for needed discharge resources and transportation as appropriate  - Identify discharge learning needs (meds, wound care, etc )  - Arrange for interpretive services to assist at discharge as needed  - Refer to Case Management Department for coordinating discharge planning if the patient needs post-hospital services based on physician/advanced practitioner order or complex needs related to functional status, cognitive ability, or social support system  Outcome: Progressing     Problem: Knowledge Deficit  Goal: Patient/family/caregiver demonstrates understanding of disease process, treatment plan, medications, and discharge instructions  Description: Complete learning assessment and assess knowledge base    Interventions:  - Provide teaching at level of understanding  - Provide teaching via preferred learning methods  Outcome: Progressing     Problem: POSTPARTUM  Goal: Experiences normal postpartum course  Description: INTERVENTIONS:  - Monitor maternal vital signs  - Assess uterine involution and lochia  Outcome: Progressing  Goal: Appropriate maternal -  bonding  Description: INTERVENTIONS:  - Identify family support  - Assess for appropriate maternal/infant bonding   -Encourage maternal/infant bonding opportunities  - Referral to  or  as needed  Outcome: Progressing  Goal: Establishment of infant feeding pattern  Description: INTERVENTIONS:  - Assess breast/bottle feeding  - Refer to lactation as needed  Outcome: Progressing  Goal: Incision(s), wounds(s) or drain site(s) healing without S/S of infection  Description: INTERVENTIONS  - Assess and document dressing, incision, wound bed, drain sites and surrounding tissue  - Provide patient and family education  Outcome: Progressing     Problem: MOBILITY - ADULT  Goal: Maintain or return to baseline ADL function  Description: INTERVENTIONS:  -  Assess patient's ability to carry out ADLs; assess patient's baseline for ADL function and identify physical deficits which impact ability to perform ADLs (bathing, care of mouth/teeth, toileting, grooming, dressing, etc )  - Assess/evaluate cause of self-care deficits   - Assess range of motion  - Assess patient's mobility; develop plan if impaired  - Assess patient's need for assistive devices and provide as appropriate  - Encourage maximum independence but intervene and supervise when necessary  - Involve family in performance of ADLs  - Assess for home care needs following discharge   - Consider OT consult to assist with ADL evaluation and planning for discharge  - Provide patient education as appropriate  Outcome: Progressing  Goal: Maintains/Returns to pre admission functional level  Description: INTERVENTIONS:  - Perform BMAT or MOVE assessment daily    - Set and communicate daily mobility goal to care team and patient/family/caregiver     - Collaborate with rehabilitation services on mobility goals if consulted  Outcome: Progressing     Problem: Prexisting or High Potential for Compromised Skin Integrity  Goal: Skin integrity is maintained or improved  Description: INTERVENTIONS:  - Identify patients at risk for skin breakdown  - Assess and monitor skin integrity  - Assess and monitor nutrition and hydration status  - Monitor labs   - Assess for incontinence   - Turn and reposition patient  - Assist with mobility/ambulation  - Relieve pressure over bony prominences  - Avoid friction and shearing  - Provide appropriate hygiene as needed including keeping skin clean and dry  - Evaluate need for skin moisturizer/barrier cream  - Collaborate with interdisciplinary team   - Patient/family teaching  - Consider wound care consult   Outcome: Progressing     Problem: Potential for Falls  Goal: Patient will remain free of falls  Description: INTERVENTIONS:  - Educate patient/family on patient safety including physical limitations  - Instruct patient to call for assistance with activity   - Consult OT/PT to assist with strengthening/mobility   - Keep Call bell within reach  - Keep bed low and locked with side rails adjusted as appropriate  - Keep care items and personal belongings within reach  - Initiate and maintain comfort rounds    Outcome: Progressing

## 2022-12-08 NOTE — PLAN OF CARE
Problem: PAIN - ADULT  Goal: Verbalizes/displays adequate comfort level or baseline comfort level  Description: Interventions:  - Encourage patient to monitor pain and request assistance  - Assess pain using appropriate pain scale  - Administer analgesics based on type and severity of pain and evaluate response  - Implement non-pharmacological measures as appropriate and evaluate response  - Consider cultural and social influences on pain and pain management  - Notify physician/advanced practitioner if interventions unsuccessful or patient reports new pain  Outcome: Progressing     Problem: INFECTION - ADULT  Goal: Absence or prevention of progression during hospitalization  Description: INTERVENTIONS:  - Assess and monitor for signs and symptoms of infection  - Monitor lab/diagnostic results  - Monitor all insertion sites, i e  indwelling lines, tubes, and drains  - Monitor endotracheal if appropriate and nasal secretions for changes in amount and color  - [place] appropriate cooling/warming therapies per order  - Administer medications as ordered  - Instruct and encourage patient and family to use good hand hygiene technique  - Identify and instruct in appropriate isolation precautions for identified infection/condition  Outcome: Progressing  Goal: Absence of fever/infection during neutropenic period  Description: INTERVENTIONS:  - Monitor WBC    Outcome: Progressing     Problem: SAFETY ADULT  Goal: Patient will remain free of falls  Description: INTERVENTIONS:  - Educate patient/family on patient safety including physical limitations  - Instruct patient to call for assistance with activity   - Consult OT/PT to assist with strengthening/mobility   - Keep Call bell within reach  - Keep bed low and locked with side rails adjusted as appropriate  - Keep care items and personal belongings within reach  - Initiate and maintain comfort rounds    Outcome: Progressing  Goal: Maintain or return to baseline ADL function  Description: INTERVENTIONS:  -  Assess patient's ability to carry out ADLs; assess patient's baseline for ADL function and identify physical deficits which impact ability to perform ADLs (bathing, care of mouth/teeth, toileting, grooming, dressing, etc )  - Assess/evaluate cause of self-care deficits   - Assess range of motion  - Assess patient's mobility; develop plan if impaired  - Assess patient's need for assistive devices and provide as appropriate  - Encourage maximum independence but intervene and supervise when necessary  - Involve family in performance of ADLs  - Assess for home care needs following discharge   - Consider OT consult to assist with ADL evaluation and planning for discharge  - Provide patient education as appropriate  Outcome: Progressing  Goal: Maintains/Returns to pre admission functional level  Description: INTERVENTIONS:  - Perform BMAT or MOVE assessment daily    - Set and communicate daily mobility goal to care team and patient/family/caregiver     - Collaborate with rehabilitation services on mobility goals if consulted  Outcome: Progressing     Problem: DISCHARGE PLANNING  Goal: Discharge to home or other facility with appropriate resources  Description: INTERVENTIONS:  - Identify barriers to discharge w/patient and caregiver  - Arrange for needed discharge resources and transportation as appropriate  - Identify discharge learning needs (meds, wound care, etc )  - Arrange for interpretive services to assist at discharge as needed  - Refer to Case Management Department for coordinating discharge planning if the patient needs post-hospital services based on physician/advanced practitioner order or complex needs related to functional status, cognitive ability, or social support system  Outcome: Progressing     Problem: Knowledge Deficit  Goal: Patient/family/caregiver demonstrates understanding of disease process, treatment plan, medications, and discharge instructions  Description: Complete learning assessment and assess knowledge base    Interventions:  - Provide teaching at level of understanding  - Provide teaching via preferred learning methods  Outcome: Progressing     Problem: POSTPARTUM  Goal: Experiences normal postpartum course  Description: INTERVENTIONS:  - Monitor maternal vital signs  - Assess uterine involution and lochia  Outcome: Progressing  Goal: Appropriate maternal -  bonding  Description: INTERVENTIONS:  - Identify family support  - Assess for appropriate maternal/infant bonding   -Encourage maternal/infant bonding opportunities  - Referral to  or  as needed  Outcome: Progressing  Goal: Establishment of infant feeding pattern  Description: INTERVENTIONS:  - Assess breast/bottle feeding  - Refer to lactation as needed  Outcome: Progressing  Goal: Incision(s), wounds(s) or drain site(s) healing without S/S of infection  Description: INTERVENTIONS  - Assess and document dressing, incision, wound bed, drain sites and surrounding tissue  - Provide patient and family education  - Perform skin care/dressing changes every   Outcome: Progressing     Problem: MOBILITY - ADULT  Goal: Maintain or return to baseline ADL function  Description: INTERVENTIONS:  -  Assess patient's ability to carry out ADLs; assess patient's baseline for ADL function and identify physical deficits which impact ability to perform ADLs (bathing, care of mouth/teeth, toileting, grooming, dressing, etc )  - Assess/evaluate cause of self-care deficits   - Assess range of motion  - Assess patient's mobility; develop plan if impaired  - Assess patient's need for assistive devices and provide as appropriate  - Encourage maximum independence but intervene and supervise when necessary  - Involve family in performance of ADLs  - Assess for home care needs following discharge   - Consider OT consult to assist with ADL evaluation and planning for discharge  - Provide patient education as appropriate  Outcome: Progressing  Goal: Maintains/Returns to pre admission functional level  Description: INTERVENTIONS:  - Perform BMAT or MOVE assessment daily    - Set and communicate daily mobility goal to care team and patient/family/caregiver     - Collaborate with rehabilitation services on mobility goals if consulted  Outcome: Progressing     Problem: Prexisting or High Potential for Compromised Skin Integrity  Goal: Skin integrity is maintained or improved  Description: INTERVENTIONS:  - Identify patients at risk for skin breakdown  - Assess and monitor skin integrity  - Assess and monitor nutrition and hydration status  - Monitor labs   - Assess for incontinence   - Turn and reposition patient  - Assist with mobility/ambulation  - Relieve pressure over bony prominences  - Avoid friction and shearing  - Provide appropriate hygiene as needed including keeping skin clean and dry  - Evaluate need for skin moisturizer/barrier cream  - Collaborate with interdisciplinary team   - Patient/family teaching  - Consider wound care consult   Outcome: Progressing     Problem: Potential for Falls  Goal: Patient will remain free of falls  Description: INTERVENTIONS:  - Educate patient/family on patient safety including physical limitations  - Instruct patient to call for assistance with activity   - Consult OT/PT to assist with strengthening/mobility   - Keep Call bell within reach  - Keep bed low and locked with side rails adjusted as appropriate  - Keep care items and personal belongings within reach  - Initiate and maintain comfort rounds    Outcome: Progressing

## 2022-12-09 NOTE — UTILIZATION REVIEW
NOTIFICATION OF ADMISSION DISCHARGE   This is a Notification of Discharge from [facility]  Please be advised that this patient has been discharge from our facility  Below you will find the admission and discharge date and time including the patient’s disposition  UTILIZATION REVIEW CONTACT:  Boo Healy  Utilization   Network Utilization Review Department  Phone: [phone] x carefully listen to the prompts  All voicemails are confidential   Email: [email or web address]     ADMISSION INFORMATION  PRESENTATION DATE: 12/5/2022  8:02 AM  OBERVATION ADMISSION DATE:   INPATIENT ADMISSION DATE: 12/5/22  8:02 AM   DISCHARGE DATE: 12/8/2022 11:45 AM   DISPOSITION:Home/Self Care    IMPORTANT INFORMATION:  Send all requests for admission clinical reviews, approved or denied determinations and any other requests to dedicated fax number below belonging to the campus where the patient is receiving treatment   List of dedicated fax numbers:  [address] DENIALS (Administrative/Medical Necessity) [phone]   [address] (Maternity/NICU/Pediatrics) [phone]   [facility] [phone]   [facility] 87 [phone]   [address] [phone]   [address] [facility] [phone]   [address] [place] [address] [phone]   [address] [place] [address] 119 [phone]   [facility]  [phone]   [address] [facility] [phone]   [address] [facility] 850 [facility] [phone]

## 2022-12-11 LAB — PLACENTA IN STORAGE: NORMAL

## 2022-12-14 NOTE — PROGRESS NOTES
S: Patient presents to the office today for postpartum incision check of RLTCS done on 12/5/22  Incision appears clean, dry, and intact  She denies incisional pain, swelling, erythema, tenderness, or drainage   Denies bladder complaints  She reports mild constipation  She takes colace regularly  Denies calf pain, chest pain, and SOB  O:   Visit Vitals  /82   Ht 5' 6" (1 676 m)   Wt 134 kg (295 lb)   LMP 03/06/2022   BMI 47 61 kg/m²   OB Status Recent pregnancy   Smoking Status Never   BSA 2 36 m²        Physical Exam  Vitals and nursing note reviewed  Constitutional:       General: She is not in acute distress  Appearance: Normal appearance  Eyes:      Conjunctiva/sclera: Conjunctivae normal    Cardiovascular:      Rate and Rhythm: Normal rate and regular rhythm  Heart sounds: Normal heart sounds  Pulmonary:      Effort: Pulmonary effort is normal       Breath sounds: Normal breath sounds  Abdominal:      General: Abdomen is flat  Palpations: Abdomen is soft  Tenderness: There is no abdominal tenderness  There is no right CVA tenderness or left CVA tenderness  Musculoskeletal:         General: Normal range of motion  Cervical back: Normal range of motion  Right lower leg: No edema  Left lower leg: No edema  Comments: Negative homans sign     Skin:     General: Skin is warm and dry  Comments: LTCS incision is healing without evidence of erythema, edema, or tenderness  Neurological:      Mental Status: She is alert and oriented to person, place, and time  Psychiatric:         Mood and Affect: Mood normal          Behavior: Behavior normal          Thought Content: Thought content normal          Judgment: Judgment normal             A/P:     -Reviewed LTCS precautions with the patient regarding strenuous activity and safe-to-drive    -Discussed constipation and increasing hydration and fiber to regulate bowels      -Discussed breast feeding and advised to schedule with  baby & me for lactation support  -Reviewed "baby blues" and fluctuating hormones during the postpartum period with her  Educated her to get rest when she can and allow periods of self-care time  Advised her to the call the office sooner if symptoms of 'baby blues' are worsening or seek immediate medical attention if feelings of self-harm or HI   - Follow-up for postpartum visit in 2 weeks

## 2022-12-16 ENCOUNTER — POSTPARTUM VISIT (OUTPATIENT)
Dept: OBGYN CLINIC | Facility: CLINIC | Age: 27
End: 2022-12-16

## 2022-12-16 VITALS
DIASTOLIC BLOOD PRESSURE: 82 MMHG | WEIGHT: 293 LBS | HEIGHT: 66 IN | SYSTOLIC BLOOD PRESSURE: 130 MMHG | BODY MASS INDEX: 47.09 KG/M2

## 2022-12-16 DIAGNOSIS — Z09 POSTOP CHECK: Primary | ICD-10-CM

## 2022-12-23 ENCOUNTER — APPOINTMENT (EMERGENCY)
Dept: RADIOLOGY | Facility: HOSPITAL | Age: 27
End: 2022-12-23

## 2022-12-23 ENCOUNTER — OFFICE VISIT (OUTPATIENT)
Dept: OBGYN CLINIC | Facility: CLINIC | Age: 27
End: 2022-12-23

## 2022-12-23 ENCOUNTER — HOSPITAL ENCOUNTER (EMERGENCY)
Facility: HOSPITAL | Age: 27
Discharge: HOME | End: 2022-12-23
Attending: EMERGENCY MEDICINE

## 2022-12-23 ENCOUNTER — TELEPHONE (OUTPATIENT)
Dept: LABOR AND DELIVERY | Facility: HOSPITAL | Age: 27
End: 2022-12-23

## 2022-12-23 ENCOUNTER — TELEPHONE (OUTPATIENT)
Dept: OBGYN CLINIC | Facility: CLINIC | Age: 27
End: 2022-12-23

## 2022-12-23 VITALS
DIASTOLIC BLOOD PRESSURE: 57 MMHG | BODY MASS INDEX: 47.09 KG/M2 | WEIGHT: 293 LBS | OXYGEN SATURATION: 96 % | HEART RATE: 100 BPM | SYSTOLIC BLOOD PRESSURE: 107 MMHG | HEIGHT: 66 IN | TEMPERATURE: 98.5 F | RESPIRATION RATE: 20 BRPM

## 2022-12-23 VITALS — BODY MASS INDEX: 46.81 KG/M2 | DIASTOLIC BLOOD PRESSURE: 76 MMHG | SYSTOLIC BLOOD PRESSURE: 122 MMHG | WEIGHT: 290 LBS

## 2022-12-23 DIAGNOSIS — S30.1XXA ABDOMINAL WALL SEROMA: Primary | ICD-10-CM

## 2022-12-23 DIAGNOSIS — R10.9 ABDOMINAL PAIN: ICD-10-CM

## 2022-12-23 DIAGNOSIS — Z98.891 S/P REPEAT LOW TRANSVERSE C-SECTION: Primary | ICD-10-CM

## 2022-12-23 LAB
ALBUMIN SERPL BCP-MCNC: 2.9 G/DL (ref 3.5–5)
ALP SERPL-CCNC: 156 U/L (ref 46–116)
ALT SERPL W P-5'-P-CCNC: 32 U/L (ref 12–78)
ANION GAP SERPL CALCULATED.3IONS-SCNC: 8 MMOL/L (ref 4–13)
AST SERPL W P-5'-P-CCNC: 17 U/L (ref 5–45)
BACTERIA UR QL AUTO: ABNORMAL /HPF
BASOPHILS # BLD AUTO: 0.02 THOUSANDS/ÂΜL (ref 0–0.1)
BASOPHILS NFR BLD AUTO: 0 % (ref 0–1)
BILIRUB SERPL-MCNC: 0.47 MG/DL (ref 0.2–1)
BILIRUB UR QL STRIP: ABNORMAL
BUN SERPL-MCNC: 15 MG/DL (ref 5–25)
CALCIUM ALBUM COR SERPL-MCNC: 9.5 MG/DL (ref 8.3–10.1)
CALCIUM SERPL-MCNC: 8.6 MG/DL (ref 8.3–10.1)
CHLORIDE SERPL-SCNC: 104 MMOL/L (ref 96–108)
CLARITY UR: ABNORMAL
CO2 SERPL-SCNC: 30 MMOL/L (ref 21–32)
COLOR UR: YELLOW
CREAT SERPL-MCNC: 0.74 MG/DL (ref 0.6–1.3)
EOSINOPHIL # BLD AUTO: 0.14 THOUSAND/ÂΜL (ref 0–0.61)
EOSINOPHIL NFR BLD AUTO: 1 % (ref 0–6)
ERYTHROCYTE [DISTWIDTH] IN BLOOD BY AUTOMATED COUNT: 13.4 % (ref 11.6–15.1)
GFR SERPL CREATININE-BSD FRML MDRD: 110 ML/MIN/1.73SQ M
GLUCOSE SERPL-MCNC: 120 MG/DL (ref 65–140)
GLUCOSE UR STRIP-MCNC: NEGATIVE MG/DL
HCT VFR BLD AUTO: 33.1 % (ref 34.8–46.1)
HGB BLD-MCNC: 10.4 G/DL (ref 11.5–15.4)
HGB UR QL STRIP.AUTO: ABNORMAL
IMM GRANULOCYTES # BLD AUTO: 0.05 THOUSAND/UL (ref 0–0.2)
IMM GRANULOCYTES NFR BLD AUTO: 0 % (ref 0–2)
KETONES UR STRIP-MCNC: NEGATIVE MG/DL
LEUKOCYTE ESTERASE UR QL STRIP: NEGATIVE
LYMPHOCYTES # BLD AUTO: 1.46 THOUSANDS/ÂΜL (ref 0.6–4.47)
LYMPHOCYTES NFR BLD AUTO: 11 % (ref 14–44)
MCH RBC QN AUTO: 27 PG (ref 26.8–34.3)
MCHC RBC AUTO-ENTMCNC: 31.4 G/DL (ref 31.4–37.4)
MCV RBC AUTO: 86 FL (ref 82–98)
MONOCYTES # BLD AUTO: 0.94 THOUSAND/ÂΜL (ref 0.17–1.22)
MONOCYTES NFR BLD AUTO: 7 % (ref 4–12)
NEUTROPHILS # BLD AUTO: 10.92 THOUSANDS/ÂΜL (ref 1.85–7.62)
NEUTS SEG NFR BLD AUTO: 81 % (ref 43–75)
NITRITE UR QL STRIP: NEGATIVE
NON-SQ EPI CELLS URNS QL MICRO: ABNORMAL /HPF
NRBC BLD AUTO-RTO: 0 /100 WBCS
PH UR STRIP.AUTO: 6 [PH]
PLATELET # BLD AUTO: 440 THOUSANDS/UL (ref 149–390)
PMV BLD AUTO: 10.2 FL (ref 8.9–12.7)
POTASSIUM SERPL-SCNC: 4 MMOL/L (ref 3.5–5.3)
PROT SERPL-MCNC: 7.4 G/DL (ref 6.4–8.4)
PROT UR STRIP-MCNC: ABNORMAL MG/DL
RBC # BLD AUTO: 3.85 MILLION/UL (ref 3.81–5.12)
RBC #/AREA URNS AUTO: ABNORMAL /HPF
SODIUM SERPL-SCNC: 142 MMOL/L (ref 135–147)
SP GR UR STRIP.AUTO: >=1.03 (ref 1–1.03)
UROBILINOGEN UR QL STRIP.AUTO: 0.2 E.U./DL
WBC # BLD AUTO: 13.53 THOUSAND/UL (ref 4.31–10.16)
WBC #/AREA URNS AUTO: ABNORMAL /HPF

## 2022-12-23 RX ORDER — DICYCLOMINE HYDROCHLORIDE 10 MG/1
20 CAPSULE ORAL ONCE
Status: COMPLETED | OUTPATIENT
Start: 2022-12-23 | End: 2022-12-23

## 2022-12-23 RX ORDER — ACETAMINOPHEN 325 MG/1
975 TABLET ORAL ONCE
Status: COMPLETED | OUTPATIENT
Start: 2022-12-23 | End: 2022-12-23

## 2022-12-23 RX ADMIN — SODIUM CHLORIDE 1000 ML: 0.9 INJECTION, SOLUTION INTRAVENOUS at 01:29

## 2022-12-23 RX ADMIN — DICYCLOMINE HYDROCHLORIDE 20 MG: 10 CAPSULE ORAL at 02:39

## 2022-12-23 RX ADMIN — ACETAMINOPHEN 975 MG: 325 TABLET ORAL at 01:00

## 2022-12-23 RX ADMIN — IOHEXOL 100 ML: 350 INJECTION, SOLUTION INTRAVENOUS at 01:48

## 2022-12-23 NOTE — TELEPHONE ENCOUNTER
Pt called stating she was seen in ED today 12/23 - they informed pt she needs to follow up with us for abdominal seroma  She has CT done as well  She is asking an appt per ED requests  We have no avail appts other than her appt she already has scheduled for 1/13/23 for her PP visit   Can you please review and advise

## 2022-12-23 NOTE — DISCHARGE INSTRUCTIONS
Based on your CAT scan it appears that you have a fluid collection in your abdominal wall  Based on your symptoms and abdominal exam we believe that this most likely a post surgical fluid collection known as a seroma  Continue Tylenol for pain control  If pain significantly worsens, if you have fevers, chills, weakness, or if you pass out, return to the emergency department immediately  Follow-up with your OB/GYN in the next 1 to 2 weeks for reassessment

## 2022-12-23 NOTE — PROGRESS NOTES
Subjective     Dennis Garcia is a 28 y o   female here for a problem visit  2 weeks post c/s with return  ex lap for hemorrhage, had increased pain and discomfort and went to ER early this am, feeling somewhat better, no fever  Reviewed labs and ct findings, discussed  Patient with no drainage at incision site, feels "bumpy" to her, assisted patient to remove glue, discussed healing areas and healing fluid in pelvis  Rectus muscles bilaterally palpated, collection on right not felt, nothing expressed from incision  Patient report voiding ok, constipation and took laxitive with uncomfortable results  Had some firm bowel movements somewhat better  Gynecologic History  Patient's last menstrual period was 2022  Last Pap: May 2022  Results were: normal    Obstetric History  OB History    Para Term  AB Living   2 2 2     2   SAB IAB Ectopic Multiple Live Births         0 2      # Outcome Date GA Lbr Terrance/2nd Weight Sex Delivery Anes PTL Lv   2 Term 21 39w1d  3155 g (6 lb 15 3 oz) F CS-LTranv Spinal N KRYSTAL   1 Term 14 40w0d  3600 g (7 lb 15 oz) F CS-Unspec EPI  KRYSTAL      Complications: Fetal Intolerance         The following portions of the patient's history were reviewed and updated as appropriate: allergies, current medications, past family history, past medical history, past social history, past surgical history and problem list     Review of Systems  Review of Systems   Constitutional: Negative for chills and fever  HENT: Negative for ear pain and sore throat  Eyes: Negative for pain and visual disturbance  Respiratory: Negative for cough and shortness of breath  Cardiovascular: Negative for chest pain and palpitations  Gastrointestinal: Positive for constipation  Negative for abdominal pain and vomiting  Genitourinary: Positive for pelvic pain  Negative for dysuria and hematuria  Musculoskeletal: Negative for arthralgias and back pain     Skin: Negative for color change and rash  Neurological: Negative for seizures and syncope  All other systems reviewed and are negative  Objective     /76 (BP Location: Right arm, Patient Position: Sitting, Cuff Size: Large)   Wt 132 kg (290 lb)   LMP 2022   Breastfeeding Yes   BMI 46 81 kg/m²   General appearance: alert and oriented, in no acute distress  Lungs: clear to auscultation bilaterally  Heart: regular rate and rhythm, S1, S2 normal, no murmur, click, rub or gallop  Abdomen: soft, mildly appropriately tender at incision, clean dry intact, glue removed, mild surface erythema consistent with yeast/mild dermatitis, reviewed keeping clean and dry, pin point opening at right margin no discharge, discussed likely suture will be coming out, no palpable area along rectus muscles no erythema, nothing expressable  Extremities: extremities normal, warm and well-perfused; no cyanosis, clubbing, or edema      Assessment  29 y/o  2 weeks post repeat c/s with later ex lap for bleeding slowly recovering  Patient was in ER with abdominal pain, small rectus muscle collection and pelvic seroma/hematoma decreasing in size  Patient counseled         Plan  Return for pp as scheduled, reviewed bowel regimen with miralax and colace, return to using some motrin and tylenol as needed

## 2022-12-23 NOTE — ED PROVIDER NOTES
History  Chief Complaint   Patient presents with   • Abdominal Pain     Patient states she gave birth 2 weeks ago and c/o abdominal pain  Thought it was gas pain, took laxative but pain was not relieved  HPI  Patient is a 23-year-old Shellie Gagnon 89 admitted 2022 for planned  complicated by hemoperitoneum requiring transfusion with 2 units PRBC, ICU admission, subsequent ex lap without further intervention presenting for evaluation of 2 days of generalized abdominal pain both in the upper and lower abdomen wrapping around to the sides, crampy, intermittent, occasionally has severe as a 6 out of 10, currently minimal   Patient states that she has been having irregular bowel movements for the last few days with some component of constipation and dark stools  Patient currently taking iron pills  Patient states light vaginal discharge, denies any heavy foul-smelling discharge, denies significant pelvic pain, vaginal bleeding or passage of clots  Patient denies fevers, chills, fatigue, headache, sore throat, cough, myalgia, recent travel or sick contacts  Patient continues to eat, drink, urinate, stool normally  Prior to Admission Medications   Prescriptions Last Dose Informant Patient Reported? Taking? Blood Glucose Monitoring Suppl (OneTouch Verio Flex System) w/Device KIT   Yes No   Sig: Use   Patient not taking: Reported on    OneTouch Delica Lancets 08K MISC   No No   Sig: Use 4 a day or as directed     Patient not taking: Reported on 2022   Prenatal MV-Min-Fe Fum-FA-DHA (PRENATAL+DHA PO)   Yes No   Sig: Take by mouth daily   acetaminophen (TYLENOL) 325 mg tablet   No No   Sig: Take 2 tablets (650 mg total) by mouth every 6 (six) hours   Patient not taking: Reported on 2022   docusate sodium (COLACE) 100 mg capsule   No No   Sig: Take 1 capsule (100 mg total) by mouth 2 (two) times a day   ferrous sulfate 324 (65 Fe) mg   No No   Sig: Take 1 tablet (324 mg total) by mouth 2 (two) times a day before meals   ibuprofen (MOTRIN) 600 mg tablet   No No   Sig: Take 1 tablet (600 mg total) by mouth every 6 (six) hours as needed (cramping)   Patient not taking: Reported on 2022      Facility-Administered Medications: None       Past Medical History:   Diagnosis Date   • Morbid obesity with BMI of 45 0-49 9, adult [facility])        Past Surgical History:   Procedure Laterality Date   •  SECTION, LOW TRANSVERSE  2014    LAST ASSESSED: 1/8/15   • LAPAROTOMY N/A 2022    Procedure: LAPAROTOMY EXPLORATORY, EVACUATION OF HEMOPERITONEUM;  Surgeon: Renny Ramirez MD;  [facility];  Service: Gynecology   • [place]  DELIVERY ONLY N/A 2022    Procedure: Scar Hodge () REPEAT;  Surgeon: Renny Ramirez MD;  Location: [facility] LD;  Service: Obstetrics       Family History   Problem Relation Age of Onset   • Cancer Mother    • Asthma Father    • Asthma Brother    • Cancer Maternal Grandmother    • Cancer Paternal Grandmother      I have reviewed and agree with the history as documented  E-Cigarette/Vaping   • E-Cigarette Use Never User      E-Cigarette/Vaping Substances   • Nicotine No    • THC No    • CBD No    • Flavoring No    • Other No    • Unknown No      Social History     Tobacco Use   • Smoking status: Never   • Smokeless tobacco: Never   Vaping Use   • Vaping Use: Never used   Substance Use Topics   • Alcohol use: Not Currently   • Drug use: No       Review of Systems   Constitutional: Negative for chills, fatigue and fever  HENT: Negative for sore throat  Eyes: Negative for visual disturbance  Respiratory: Negative for cough, chest tightness and shortness of breath  Cardiovascular: Negative for chest pain  Gastrointestinal: Positive for abdominal pain and constipation  Negative for abdominal distention, diarrhea, nausea and vomiting  Endocrine: Negative for polydipsia and polyuria  Genitourinary: Negative for dysuria and hematuria  Musculoskeletal: Negative for arthralgias and myalgias  Skin: Negative for color change and rash  Neurological: Negative for dizziness and headaches  Psychiatric/Behavioral: Negative for confusion  All other systems reviewed and are negative  Physical Exam  Physical Exam  Vitals reviewed  Constitutional:       General: She is not in acute distress  Appearance: She is well-developed  She is not diaphoretic  Comments: Well-appearing, nondistressed   HENT:      Head: Normocephalic and atraumatic  Comments: Moist mucous membranes     Right Ear: External ear normal       Left Ear: External ear normal       Nose: Nose normal       Mouth/Throat:      Pharynx: No oropharyngeal exudate  Eyes:      Pupils: Pupils are equal, round, and reactive to light  Cardiovascular:      Rate and Rhythm: Normal rate and regular rhythm  Heart sounds: Normal heart sounds  No murmur heard  No friction rub  No gallop  Comments: Regular rate and rhythm, no murmurs rubs or gallops  Extremities warm and well-perfused without mottling  Pulmonary:      Effort: Pulmonary effort is normal  No respiratory distress  Breath sounds: Normal breath sounds  No wheezing  Chest:      Chest wall: No tenderness  Abdominal:      General: Bowel sounds are normal  There is no distension  Palpations: Abdomen is soft  There is no mass  Tenderness: There is no abdominal tenderness  There is no guarding  Comments: Trace erythema around pelvic incision site consistent with mild candidal infection, no evidence of cellulitis  No discharge  Abdomen soft, mild generalized tenderness, no rigidity, rebound, guarding  Musculoskeletal:         General: No deformity  Normal range of motion  Cervical back: Normal range of motion  Lymphadenopathy:      Cervical: No cervical adenopathy  Skin:     General: Skin is warm and dry  Capillary Refill: Capillary refill takes less than 2 seconds  Neurological:      Mental Status: She is alert and oriented to person, place, and time  Vital Signs  ED Triage Vitals   Temperature Pulse Respirations Blood Pressure SpO2   12/23/22 0045 12/23/22 0045 12/23/22 0045 12/23/22 0045 12/23/22 0045   98 5 °F (36 9 °C) (!) 120 20 146/73 100 %      Temp Source Heart Rate Source Patient Position - Orthostatic VS BP Location FiO2 (%)   12/23/22 0045 12/23/22 0045 -- -- --   Oral Monitor         Pain Score       12/23/22 0100       5           Vitals:    12/23/22 0045 12/23/22 0315   BP: 146/73 107/57   Pulse: (!) 120 100         Visual Acuity      ED Medications  Medications   acetaminophen (TYLENOL) tablet 975 mg (975 mg Oral Given 12/23/22 0100)   sodium chloride 0 9 % bolus 1,000 mL (0 mL Intravenous Stopped 12/23/22 0324)   iohexol (OMNIPAQUE) 350 MG/ML injection (SINGLE-DOSE) 100 mL (100 mL Intravenous Given 12/23/22 0148)   dicyclomine (BENTYL) capsule 20 mg (20 mg Oral Given 12/23/22 0239)       Diagnostic Studies  Results Reviewed     Procedure Component Value Units Date/Time    Urinalysis with microscopic [[id number]]  (Abnormal) Collected: 12/23/22 0114    Lab Status: Final result Specimen: Urine, Clean Catch Updated: 12/23/22 0148     Color, UA Yellow     Clarity, UA Slightly Cloudy     Specific Gravity, UA >=1 030     pH, UA 6 0     Leukocytes, UA Negative     Nitrite, UA Negative     Protein, UA 30 (1+) mg/dl      Glucose, UA Negative mg/dl      Ketones, UA Negative mg/dl      Urobilinogen, UA 0 2 E U /dl      Bilirubin, UA Small     Occult Blood, UA Large     RBC, UA Innumerable /hpf      WBC, UA 1-2 /hpf      Epithelial Cells None Seen /hpf      Bacteria, UA Occasional /hpf     Narrative:      <10 mL of urine submitted  Performed on concentrated sample       Comprehensive metabolic panel [[id number]]  (Abnormal) Collected: 12/23/22 0114    Lab Status: Final result Specimen: Blood from Arm, Right Updated: 12/23/22 0139     Sodium 142 mmol/L Potassium 4 0 mmol/L      Chloride 104 mmol/L      CO2 30 mmol/L      ANION GAP 8 mmol/L      BUN 15 mg/dL      Creatinine 0 74 mg/dL      Glucose 120 mg/dL      Calcium 8 6 mg/dL      Corrected Calcium 9 5 mg/dL      AST 17 U/L      ALT 32 U/L      Alkaline Phosphatase 156 U/L      Total Protein 7 4 g/dL      Albumin 2 9 g/dL      Total Bilirubin 0 47 mg/dL      eGFR 110 ml/min/1 73sq m     Narrative:      National Kidney Disease Foundation guidelines for Chronic Kidney Disease (CKD):   •  Stage 1 with normal or high GFR (GFR > 90 mL/min/1 73 square meters)  •  Stage 2 Mild CKD (GFR = 60-89 mL/min/1 73 square meters)  •  Stage 3A Moderate CKD (GFR = 45-59 mL/min/1 73 square meters)  •  Stage 3B Moderate CKD (GFR = 30-44 mL/min/1 73 square meters)  •  Stage 4 Severe CKD (GFR = 15-29 mL/min/1 73 square meters)  •  Stage 5 End Stage CKD (GFR <15 mL/min/1 73 square meters)  Note: GFR calculation is accurate only with a steady state creatinine    CBC and differential [[id number]]  (Abnormal) Collected: 12/23/22 0114    Lab Status: Final result Specimen: Blood from Arm, Right Updated: 12/23/22 0123     WBC 13 53 Thousand/uL      RBC 3 85 Million/uL      Hemoglobin 10 4 g/dL      Hematocrit 33 1 %      MCV 86 fL      MCH 27 0 pg      MCHC 31 4 g/dL      RDW 13 4 %      MPV 10 2 fL      Platelets 260 Thousands/uL      nRBC 0 /100 WBCs      Neutrophils Relative 81 %      Immat GRANS % 0 %      Lymphocytes Relative 11 %      Monocytes Relative 7 %      Eosinophils Relative 1 %      Basophils Relative 0 %      Neutrophils Absolute 10 92 Thousands/µL      Immature Grans Absolute 0 05 Thousand/uL      Lymphocytes Absolute 1 46 Thousands/µL      Monocytes Absolute 0 94 Thousand/µL      Eosinophils Absolute 0 14 Thousand/µL      Basophils Absolute 0 02 Thousands/µL                  CT abdomen pelvis with contrast   Final Result by Tash Segal DO (12/23 0309)      Uterus has essentially normalized in size status post  and when compared to the prior  Mesenteric edema with a small amount of complex fluid in the anterior pelvis which appears to have intervally decreased when compared to the prior, consider blood products, seroma and/or infection  A small rectus sheath hematoma on the right inferiorly    is suspected and measures approximately 3 6 cm in size (axial image 70, series 2)      Scarring in the anterior lower abdominal /pelvic wall with associated subcutaneous fluid collection measuring approximately 11 3 x 2 1 x 2 9 cm in size (axial image 72, series 2, and sagittal image 155, series 602), could represent seroma, hematoma or    abscess  Correlation with the patient's symptoms and laboratory values recommended  Partially distended bladder  Mild circumferential bladder wall thickening noted, probably exaggerated by underdistention  Superimposed cystitis considered in the appropriate clinical setting  Right-sided nephrolithiasis, no hydronephrosis, and other findings as above  Workstation performed: [id number]                    Procedures  Procedures         ED Course                                             MDM  Number of Diagnoses or Management Options  Abdominal pain  Abdominal wall seroma  Diagnosis management comments: Patient well-appearing with a grossly benign abdominal examination, denying any significant pelvic symptoms  Given patient's abdominal pain and recent surgery, plan for CT abdomen pelvis with IV contrast to evaluate for postsurgical complication, lab evaluation occluding CBC, CMP, urinalysis to evaluate for electrolyte or renal derangement, anemia given patient's recent intra-abdominal bleed, symptomatic management with Tylenol  Patient with 11 by 2 x 2 centimeter fluid collection in abdominal wall correlating with area of pain  Patient afebrile, denying constitutional symptoms  Interval improvement of hemoglobin    Based on patient presentation seems most consistent with seroma  Patient pain-free at time of reassessment  Instructed to follow-up with her OB/GYN for reassessment, provided with strict return precautions regarding symptoms of infection, discharged  Disposition  Final diagnoses:   Abdominal wall seroma   Abdominal pain     Time reflects when diagnosis was documented in both MDM as applicable and the Disposition within this note     Time User Action Codes Description Comment    12/23/2022  3:29 AM Isaias Mike Add [S30 1XXA] Abdominal wall seroma     12/23/2022  3:29 AM Isaias Mike Add [R10 9] Abdominal pain       ED Disposition     ED Disposition   Discharge    Condition   Stable    Date/Time   Fri Dec 23, 2022  3:28 AM    Comment   Simone Starr discharge to home/self care                 Follow-up Information     Follow up With Specialties Details Why Contact Info Additional Information    [address] [facility] Emergency Department Emergency Medicine  If symptoms worsen [facility] [address]  [address] [address] Emergency Department, [place], [place], [address]          Discharge Medication List as of 12/23/2022  3:31 AM      CONTINUE these medications which have NOT CHANGED    Details   acetaminophen (TYLENOL) 325 mg tablet Take 2 tablets (650 mg total) by mouth every 6 (six) hours, Starting u 12/8/2022, Normal      Blood Glucose Monitoring Suppl (OneTouch Verio Flex System) w/Device KIT Use, Historical Med      docusate sodium (COLACE) 100 mg capsule Take 1 capsule (100 mg total) by mouth 2 (two) times a day, Starting u 12/8/2022, Normal      ferrous sulfate 324 (65 Fe) mg Take 1 tablet (324 mg total) by mouth 2 (two) times a day before meals, Starting u 12/8/2022, Normal      ibuprofen (MOTRIN) 600 mg tablet Take 1 tablet (600 mg total) by mouth every 6 (six) hours as needed (cramping), Starting u 12/8/2022, Normal      OneTouch Delica Lancets 65G MISC Use 4 a day or as directed , Normal      Prenatal MV-Min-Fe Fum-FA-DHA (PRENATAL+DHA PO) Take by mouth daily, Historical Med             No discharge procedures on file      PDMP Review     None          ED Provider  Electronically Signed by           Sarah Victoria MD  12/23/22 8549

## 2023-01-13 ENCOUNTER — POSTPARTUM VISIT (OUTPATIENT)
Dept: OBGYN CLINIC | Facility: CLINIC | Age: 28
End: 2023-01-13

## 2023-01-13 VITALS — BODY MASS INDEX: 46.32 KG/M2 | SYSTOLIC BLOOD PRESSURE: 122 MMHG | WEIGHT: 287 LBS | DIASTOLIC BLOOD PRESSURE: 78 MMHG

## 2023-01-13 DIAGNOSIS — Z30.016 ENCOUNTER FOR INITIAL PRESCRIPTION OF TRANSDERMAL PATCH HORMONAL CONTRACEPTIVE DEVICE: Primary | ICD-10-CM

## 2023-01-13 DIAGNOSIS — Z98.891 S/P REPEAT LOW TRANSVERSE C-SECTION: ICD-10-CM

## 2023-01-13 NOTE — PROGRESS NOTES
Post-Partum Checkup Visit    Isidro Marcial is a 28 y o  [id number] female     Assessment:  Delivered a female  (6lb 15 3oz) via scheduled RLTCS complicated by postpartum hemoperitoneum presumed secondary to rectus sheath hematoma requiring ex lap for evacuation and ABLA requiring 2URPBC  Patient was seen in the ED on  and in the office that day regarding possible seroma seen on CT scan  Patient is doing well today  Plan:    Problem List Items Addressed This Visit        Unprioritized    S/P repeat low transverse      Breastfeeding: No  Contraception: None at this time; Planning for patch after 6 weeks postpartum  Depression score: EDPS 5  Activity: Restricted until 6 weeks postpartum  Complicated delivery: Patient has had difficulty coping but feels better after discussion today  A1GDM: 2hr GTT ordered  RTO 3 months for annual exam and PRN        Other Visit Diagnoses     Encounter for initial prescription of transdermal patch hormonal contraceptive device    -  Primary    Relevant Medications    norelgestromin-ethinyl estradiol (ORTHO EVRA) 150-35 MCG/24HR          Subjective/Objective     Subjective:   Pain: no  Tolerating Oral Intake: yes; patient has decreased appetite  Voiding: yes  Flatus: yes  Bowel Movement: yes  Ambulating: yes  Breastfeeding: Bottle feeding  Chest Pain: no  Shortness of Breath: no  Leg Pain/Discomfort: no  Lochia: none    Objective:   Vitals:  Vitals:    23 1540   BP: 122/78       Physical Exam:  Physical Exam  Vitals reviewed  Exam conducted with a chaperone present  Constitutional:       General: She is not in acute distress  Appearance: Normal appearance  She is obese  She is not ill-appearing, toxic-appearing or diaphoretic  Cardiovascular:      Rate and Rhythm: Normal rate  Pulmonary:      Effort: Pulmonary effort is normal  No respiratory distress  Abdominal:      General: There is no distension  Palpations: Abdomen is soft   There is no mass       Tenderness: There is no abdominal tenderness  There is no guarding or rebound  Comments: Incision well healed and without evidence of infection, erythema, or bleeding   Skin:     General: Skin is warm and dry  Neurological:      Mental Status: She is alert and oriented to person, place, and time  Mental status is at baseline  Psychiatric:         Mood and Affect: Mood normal          Behavior: Behavior normal          Thought Content:  Thought content normal          Judgment: Judgment normal            OB Hx:  OB History    Para Term  AB Living   2 2 2 0 0 2   SAB IAB Ectopic Multiple Live Births   0 0 0 0 2      # Outcome Date GA Lbr Terrance/2nd Weight Sex Delivery Anes PTL Lv   2 Term 22 39w1d  3155 g (6 lb 15 3 oz) F CS-LTranv Spinal N KRYSTAL      Name: Rodrigo Moctezumazebsandra (Moraima Lynndyl)      [facility] : 9  Apgar5: 9   1 Term 14 40w0d  3600 g (7 lb 15 oz) F CS-Unspec EPI  KRYSTAL      Complications: Fetal Intolerance     Medical Hx  Past Medical History:   Diagnosis Date   • Morbid obesity with BMI of 45 0-49 9, adult [facility])      Surgical Hx  Past Surgical History:   Procedure Laterality Date   •  SECTION, LOW TRANSVERSE  2014    LAST ASSESSED: 1/8/15   • LAPAROTOMY N/A 2022    Procedure: LAPAROTOMY EXPLORATORY, EVACUATION OF HEMOPERITONEUM;  Surgeon: Charlotet Hollingsworth MD;  [facility];  Service: Gynecology   • [place]  DELIVERY ONLY N/A 2022    Procedure:  SECTION () REPEAT;  Surgeon: Charlotte Hollingsworth MD;  [facility] ;  Service: Obstetrics     Family Hx  Family History   Problem Relation Age of Onset   • Cancer Mother    • Asthma Father    • Asthma Brother    • Cancer Maternal Grandmother    • Cancer Paternal Grandmother      Social Hx  Social History     Socioeconomic History   • Marital status: /Civil Union     Spouse name: Not on file   • Number of children: Not on file   • Years of education: Not on file   • Highest education level: Not on file   Occupational History   • Not on file   Tobacco Use   • Smoking status: Never   • Smokeless tobacco: Never   Vaping Use   • Vaping Use: Never used   Substance and Sexual Activity   • Alcohol use: Not Currently   • Drug use: No   • Sexual activity: Not Currently     Partners: Male     Birth control/protection: None   Other Topics Concern   • Not on file   Social History Narrative   • Not on file     Social Determinants of Health     Financial Resource Strain: Not on file   Food Insecurity: Not on file   Transportation Needs: Not on file   Physical Activity: Not on file   Stress: Not on file   Social Connections: Not on file   Intimate Partner Violence: Not on file   Housing Stability: Not on file     Allergies  No Known Allergies    Lu Golden MD  OB/GYN  1/13/2023  8:59 PM

## 2023-01-14 NOTE — ASSESSMENT & PLAN NOTE
Breastfeeding: No  Contraception: None at this time; Planning for patch after 6 weeks postpartum  Depression score: EDPS 5  Activity: Restricted until 6 weeks postpartum  Complicated delivery: Patient has had difficulty coping but feels better after discussion today  A1GDM: 2hr GTT ordered  RTO 3 months for annual exam and PRN

## 2023-05-30 ENCOUNTER — OFFICE VISIT (OUTPATIENT)
Dept: BARIATRICS | Facility: CLINIC | Age: 28
End: 2023-05-30

## 2023-05-30 VITALS
DIASTOLIC BLOOD PRESSURE: 68 MMHG | SYSTOLIC BLOOD PRESSURE: 104 MMHG | HEIGHT: 65 IN | WEIGHT: 280.6 LBS | HEART RATE: 84 BPM | BODY MASS INDEX: 46.75 KG/M2

## 2023-05-30 DIAGNOSIS — E66.01 CLASS 3 SEVERE OBESITY DUE TO EXCESS CALORIES WITHOUT SERIOUS COMORBIDITY WITH BODY MASS INDEX (BMI) OF 45.0 TO 49.9 IN ADULT (HCC): Primary | ICD-10-CM

## 2023-05-30 PROBLEM — E66.813 CLASS 3 SEVERE OBESITY DUE TO EXCESS CALORIES WITHOUT SERIOUS COMORBIDITY WITH BODY MASS INDEX (BMI) OF 45.0 TO 49.9 IN ADULT: Status: ACTIVE | Noted: 2023-05-30

## 2023-05-30 RX ORDER — PHENTERMINE HYDROCHLORIDE 37.5 MG/1
TABLET ORAL
COMMUNITY
Start: 2023-04-26

## 2023-05-30 NOTE — PROGRESS NOTES
Assessment/Plan:    Class 3 severe obesity due to excess calories without serious comorbidity with body mass index (BMI) of 45 0 to 49 9 in adult [facility])  - Discussed options of HealthyCORE-Intensive Lifestyle Intervention Program, Very Low Calorie Diet-VLCD, Conservative Program, Davi-En-Y Gastric Bypass and Vertical Sleeve Gastrectomy and the role of weight loss medications  Patient is not interested in surgical options at this time  - Explained the importance of making lifestyle changes in addition to anti-obesity medications  - Patient is interested in pursuing HealthyCORE-Intensive Lifestyle Intervention Program and follow up visits with medical weight management provider  - Initial weight loss goal of 5-10% weight loss for improved health  - Weight loss can improve patient's co-morbid conditions and/or prevent weight-related complications  - Labs reviewed from 12/2022  -Patient lifestyle habits and barriers to weight loss were reviewed today  Patient will be completing healthy core program to gain a strong lifestyle foundation as well as behavioral health support  Patient was given a meal plan today to use as guidance for meal options  A calorie goal of 1800 to 2000 alexis/day was discussed and will be further discussed with the dietitian at her upcoming appointment  Patient was encouraged to continue with water intake and to avoid diet soda intake as this can promote sugar cravings  She was encouraged to also consider incorporating more aerobic exercise into her schedule  Medications were reviewed and patient is already taking phentermine that was prescribed by her PCP  Her EKG in December 2022 was normal   She will continue with this medication as she has another month supply at home  We will discuss at future appointments if this will be continued or if a new medication may be more appropriate for her needs  Contrave was discussed as patient reports she is often an emotional eater    Patient did not tolerate GI side effects of Ozempic    Goals:  Do not skip meals  Food log via radha - options include  www  myfitnesspal com, sparkpeople  com, loseit com, calorieking  com, baritaEnigmatec  No sugary beverages  At least 64oz of water daily  Increase physical activity by 10 minutes daily  Gradually increase physical activity to a goal of 5 days per week for 30 minutes of MODERATE intensity PLUS 2 days per week of FULL BODY resistance training           Darcie Silver was seen today for consult  Diagnoses and all orders for this visit:    Class 3 severe obesity due to excess calories without serious comorbidity with body mass index (BMI) of 45 0 to 49 9 in [facility])         Medication consent was reviewed today and all questions were answered  Patient agreed with all bulleted points  Consent was signed, scanned into chart and patient was provided with a copy for their records  Total time spent reviewing chart, interviewing patient, examining patient, discussing plan, answering all questions, and documentin min, with >50% face-to-face time spent counseling patient on nonsurgical interventions for the treatment of excess weight  Discussed in detail nonsurgical options including intensive lifestyle intervention program, very low-calorie diet program and conservative program   Discussed the role of weight loss medications  Counseled patient on diet behavior and exercise modification for weight loss  Follow up in approximately 6 weeks with Non-Surgical Physician/Advanced Practitioner  Subjective:   Chief Complaint   Patient presents with   • Consult     Pt is here for MWM consult       Patient ID: Rick Bahena  is a 28 y o  female with excess weight/obesity here to pursue weight management  Previous notes and records have been reviewed      Past Medical History:   Diagnosis Date   • Morbid obesity with BMI of 45 0-49 9, [facility])      Past Surgical History:   Procedure Laterality Date   •  SECTION, LOW TRANSVERSE  2014    LAST ASSESSED: 1/8/15   • LAPAROTOMY N/A 2022    Procedure: LAPAROTOMY EXPLORATORY, EVACUATION OF HEMOPERITONEUM;  Surgeon: Brad Carver MD;  [facility]: [facility];  Service: Gynecology   • [place]  DELIVERY ONLY N/A 2022    Procedure:  SECTION () REPEAT;  Surgeon: Brad Carver MD;  [facility];  Service: Obstetrics       HPI:  Wt Readings from Last 20 Encounters:   23 127 kg (280 lb 9 6 oz)   23 130 kg (287 lb)   22 132 kg (290 lb)   22 134 kg (295 lb)   22 134 kg (295 lb)   22 (!) 138 kg (305 lb)   22 (!) 141 kg (311 lb)   22 (!) 141 kg (311 lb 11 7 oz)   22 (!) 141 kg (311 lb 6 4 oz)   22 (!) 140 kg (308 lb)   22 (!) 141 kg (310 lb 6 4 oz)   22 (!) 139 kg (306 lb 12 8 oz)   22 (!) 140 kg (308 lb 9 6 oz)   10/31/22 (!) 140 kg (309 lb 4 9 oz)   10/21/22 (!) 140 kg (308 lb)   10/07/22 (!) 138 kg (304 lb 12 8 oz)   22 (!) 140 kg (307 lb 15 7 oz)   22 (!) 138 kg (305 lb)   22 (!) 138 kg (304 lb 12 8 oz)   22 (!) 139 kg (307 lb 6 4 oz)       Patient presents today to medical weight management office for consult  Patient has struggled with her weight her entire life, her lowest weight was in high school when she was 150-160 lbs  About 3 years ago she did keto for 6 months and gained 80 pounds, but subsequently gained it all back and then some more  She has 2 children one 5year-old and one 10month-old, she had gestational diabetes with her most recent pregnancy  Her PCP tried her on Ozempic but patient did not tolerate the GI side effects and also did not lose weight  She was then placed on phentermine that she started in March and has lost 13 pounds since starting this medication  Patient reports that she notes improvement in her hunger, when she does not take the medication she is always looking for food    She is tolerating the phentermine well and denies any insomnia, anxiety, palpitations or chest pains  Her blood pressure and pulse are stable today  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, but has no dedicated exercise  She also reports that she is a stress eater and when she is upset she will eat a large amount of food and a small period of time  Her sister recently had bariatric surgery, and patient may consider for the future but would like to try to do it on her own first     Obesity/Excess Weight:  Severity: Very Severe  Onset:  lifelong    Modifiers: Diet and Exercise, Physician Supervised Weight Loss Program, Commercial Weight Loss Programs-ie  Weight Watchers, [employer], Nutrisystem, etc  and Prescription Weight Loss Medications  Contributing factors: Poor Food Choices, Insufficient Physical Activity, Stress/Emotional Eating, Binge Eating and Pregnancy  Associated symptoms: comorbid conditions, fatigue, increased joint pain, decreased exercise capacity, body image issues and decreased self esteem    Diet recall: (works 3am-3pm)  Skips meals often  B: skips or premier protein shake  L: sandwich, leftovers  D: protein (chicken, beef), starch, corn  S: not often - crackers sometimes, on days off    Hydration: water - 40-80oz , 1-2 cans of diet coke  Alcohol: no  Smoking: no  Exercise: steps with work  Occupation: CNA  Sleep: ok - young kids    Highest weight: 313 lbs  Current weight: 280 8 lbs  Goal weight: 170-180 lbs      The following portions of the patient's history were reviewed and updated as appropriate: allergies, current medications, past family history, past medical history, past social history, past surgical history, and problem list     Family History   Problem Relation Age of Onset   • Cancer Mother    • Asthma Father    • Asthma Brother    • Cancer Maternal Grandmother    • Cancer Paternal Grandmother         Review of Systems   Constitutional: Negative for fatigue  HENT: Negative for sore throat  "  Respiratory: Negative for cough and shortness of breath  Cardiovascular: Negative for chest pain, palpitations and leg swelling  Gastrointestinal: Negative for abdominal pain, constipation, diarrhea and nausea  Genitourinary: Negative for dysuria  Musculoskeletal: Positive for arthralgias and back pain  Skin: Negative for rash  Neurological: Negative for headaches  Psychiatric/Behavioral: Negative for dysphoric mood  The patient is not nervous/anxious  Objective:  /68   Pulse 84   Ht 5' 5\" (1 651 m)   Wt 127 kg (280 lb 9 6 oz)   LMP 05/16/2023 (Approximate)   BMI 46 69 kg/m²     Physical Exam  Vitals and nursing note reviewed  Constitutional:       Appearance: Normal appearance  She is obese  HENT:      Head: Normocephalic  Pulmonary:      Effort: Pulmonary effort is normal    Neurological:      General: No focal deficit present  Mental Status: She is alert and oriented to person, place, and time  Psychiatric:         Mood and Affect: Mood normal          Behavior: Behavior normal          Thought Content: Thought content normal          Judgment: Judgment normal                 Labs and Imaging  Recent labs and imaging have been personally reviewed    Lab Results   Component Value Date    HCT 33 1 (L) 12/23/2022    HGB 10 4 (L) 12/23/2022    MCV 86 12/23/2022     (H) 12/23/2022    WBC 13 53 (H) 12/23/2022     Lab Results   Component Value Date    AGAP 8 12/23/2022    ALKPHOS 156 (H) 12/23/2022    ALT 32 12/23/2022    AST 17 12/23/2022    BUN 15 12/23/2022    CALCIUM 8 6 12/23/2022     12/23/2022    CO2 30 12/23/2022    CREATININE 0 74 12/23/2022    EGFR 110 12/23/2022    GLUC 120 12/23/2022    GLUF 85 09/23/2022    K 4 0 12/23/2022    SODIUM 142 12/23/2022    TBILI 0 47 12/23/2022    TP 7 4 12/23/2022     Lab Results   Component Value Date    HGBA1C 5 0 09/23/2022     No results found for: \"TSH\", \"EPN3YPCVOWPI\"  No results found for: \"CHOLESTEROL\"  No " "results found for: \"HDL\"  No results found for: \"TRIG\"  No results found for: \"LDLCALC\"    "

## 2023-05-30 NOTE — ASSESSMENT & PLAN NOTE
- Discussed options of HealthyCORE-Intensive Lifestyle Intervention Program, Very Low Calorie Diet-VLCD, Conservative Program, Davi-En-Y Gastric Bypass and Vertical Sleeve Gastrectomy and the role of weight loss medications  Patient is not interested in surgical options at this time  - Explained the importance of making lifestyle changes in addition to anti-obesity medications  - Patient is interested in pursuing HealthyCORE-Intensive Lifestyle Intervention Program and follow up visits with medical weight management provider  - Initial weight loss goal of 5-10% weight loss for improved health  - Weight loss can improve patient's co-morbid conditions and/or prevent weight-related complications  - Labs reviewed from 12/2022  -Patient lifestyle habits and barriers to weight loss were reviewed today  Patient will be completing healthy core program to gain a strong lifestyle foundation as well as behavioral health support  Patient was given a meal plan today to use as guidance for meal options  A calorie goal of 1800 to 2000 alexis/day was discussed and will be further discussed with the dietitian at her upcoming appointment  Patient was encouraged to continue with water intake and to avoid diet soda intake as this can promote sugar cravings  She was encouraged to also consider incorporating more aerobic exercise into her schedule  Medications were reviewed and patient is already taking phentermine that was prescribed by her PCP  Her EKG in December 2022 was normal   She will continue with this medication as she has another month supply at home  We will discuss at future appointments if this will be continued or if a new medication may be more appropriate for her needs  Contrave was discussed as patient reports she is often an emotional eater  Patient did not tolerate GI side effects of Ozempic    Goals:  Do not skip meals  Food log via radha - options include  www  myfitnesspal com, sparkpeople  com, loseit com, calorieking  com, baritastic  No sugary beverages  At least 64oz of water daily  Increase physical activity by 10 minutes daily   Gradually increase physical activity to a goal of 5 days per week for 30 minutes of MODERATE intensity PLUS 2 days per week of FULL BODY resistance training

## 2023-06-12 ENCOUNTER — TELEPHONE (OUTPATIENT)
Dept: BARIATRICS | Facility: CLINIC | Age: 28
End: 2023-06-12

## 2023-08-14 NOTE — PROGRESS NOTES
Weight Management Medical Nutrition Assessment  Dany is here today for Healthy Core initial visit. Current weight 294.3#. 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 She often skips breakfast due to her early mornings, will stress eat prior to lunch, and may push her lunch back until 2 pm. Her meal times are often dependent on what is required of her patients but she does have a midmorning break around 10 am. Patient also reports rebound hunger and emotional eating, often times binges, after dinner meal. Discussed benefits of interval eating, adequate protein intake, and mindful eating. Hydration typically adequate but may be inadequate depending on the day. Discussed benefits of reducing diet soda intake and replacing it with seltzer water or free water. Patient stated she often loses motivation when she doesn't see progress. Discussed realistic weight loss and patient understands that her goal weight will take time and consistency. Discussed motivators for lifestyle changes and patient identified hers (listed below). Discussed importance of physical activity in weight loss and general health. Encouraged patient to increase daily activity ([employer] hoop, treadmill) by 10 mins with eventual goal of 30 mins 5x/week. (Goal set: 2x/week for 15-30 min). Low-calorie meal plan reviewed. Completed a body composition using SECA scale and will review results with patient at Month 1 f/u.      Motivator for weight loss: teaching her daughter how to life a healthy life and what healthy meals look and taste like    Likes: nuts, cheese, peanut butter, trying with [place] yogurt, hummus    Patient seen by Medical Provider in past 6 months:  yes  Requested to schedule appointment with Medical Provider: No    Anthropometric Measurements  Start Weight (#): 294.3# (8/15/23)  Current Weight (#): n/a  TBW % Change from start weight: n/a  Ideal Body Weight (#): 125# (65")  Goal Weight (#): 180-190#    Weight Loss History  Previous weight loss attempts: Commercial Programs (Weight Watchers, [employer], etc.)  Exercise, Physician Supervised Weight Loss Program, Prescription Weight Loss Medication  Self Created Diets (Portion Control, Healthy Food Choices, etc.)    Food and Nutrition Related History  Wake up: 2 am   Bed Time: 11 pm   Sleep quality: not well, has young kids and is with children until  gets home at 11 pm   Works 3 am-3 pm (currently on maternity leave)    Dietary Recall  Breakfast: -- Or previously would have a premier protein shake before work    Snack (10 am): vending machine snacks (stress eats when at work)   Lunch (1-2 pm): sandwich Or leftovers  Snack: --  Dinner (5-7 pm): protein (chicken, beef, meatloaf)/carb (potatoes, noodles, rice)/corn or other veggie Or dining out   Snack: -- Or crackers Or may binge eat at this time     Beverages: water, diet soda (2-3 cans/day), occasional starbucks   Volume of beverage intake: 40-80 oz water (her goal is 60 oz water)      Weekends: has breakfast (pancakes, [place] toast, with eggs), will skip lunch   Cravings: chocolate   Trouble area of day: evenings     Frequency of Eating out: 3x/week  Food restrictions: none, dislikes seafood  Cooking: self  Food Shopping: self     Occupation: CNA (on feet at work, 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)    Physical Activity  Activity: No formal routine (steps at work) [employer] treadmill, infinity hoop, and stationary bike]   Frequency:occasionally  Physical limitations/barriers to exercise: none     Estimated Needs  Energy  SECA: BMR: 2,188 X 1.4 -1000 = 3,063 kcal     [place] Energy Needs (needs at 294.3#)  BMR: 2,066 kcal  Maintenance calories (sedentary): 2,479 kcal  1-2#/week loss (sedentary): 1,479-1,979 kcal  1-2#/week loss (light activity): 1,840-2,340 kcal    Protein: 68-85 grams (1.2-1.5g/kg IBW)  Fluid: 66 ounces (35mL/kg IBW)    Nutrition Diagnosis  Yes;     Overweight/obesity related to Excess energy intake as evidenced by BMI more than normative standard for age and sex (obesity-grade III 36+)     Nutrition Intervention    Nutrition Prescription  Calories: 1,600-1,900 kcal  Protein: 68-85 g  Fluid: 66+ ounces    Meal Plan (Momo/Pro)  Breakfast: 300-350/15-30  Snack: 100-150/5+  Lunch: 500/25-35  Snack: 100-150/5+  Dinner:   Snack:     Nutrition Education  Healthy Core Manual   Calorie controlled menu  Lean protein food choices  Healthy snack options  Food journaling tips    Nutrition Counseling  Strategies: meal planning, portion sizes, healthy snack choices, hydration, fiber intake, protein intake, exercise, food logging    Monitoring and Evaluation:    Evaluation criteria  Energy Intake  Meet protein needs  Maintain adequate hydration  Monitor weekly weight  Meal planning/preparation  Food journal   Decreased portions at mealtimes and snacks  Physical activity     Barriers to learning:none  Readiness to change: Preparation:  (Getting ready to change)   Comprehension: good  Expected Compliance: good

## 2023-08-15 ENCOUNTER — OFFICE VISIT (OUTPATIENT)
Dept: BARIATRICS | Facility: CLINIC | Age: 28
End: 2023-08-15

## 2023-08-15 VITALS — HEIGHT: 65 IN | WEIGHT: 293 LBS | BODY MASS INDEX: 48.82 KG/M2

## 2023-08-15 DIAGNOSIS — R63.5 ABNORMAL WEIGHT GAIN: Primary | ICD-10-CM

## 2023-08-15 PROCEDURE — RECHECK

## 2023-08-15 PROCEDURE — WMPRO12

## 2023-08-17 ENCOUNTER — OFFICE VISIT (OUTPATIENT)
Dept: BARIATRICS | Facility: CLINIC | Age: 28
End: 2023-08-17

## 2023-08-17 VITALS — BODY MASS INDEX: 49.09 KG/M2 | WEIGHT: 293 LBS

## 2023-08-17 DIAGNOSIS — R63.5 ABNORMAL WEIGHT GAIN: Primary | ICD-10-CM

## 2023-08-17 PROCEDURE — RECHECK

## 2023-08-17 NOTE — PROGRESS NOTES
Patient presents for 1 hour Behavioral Health Evaluation as a part of Medical Weight Management Program, current weight 295lbs. Eating behaviors/food choices: Patient reports history of efforts to change eating patterns but lack of nutrition education and a busy work schedule have made sustainable change difficult. Patient appears to work well within structure and wants a plan that she can follow, feels she becomes too confused with label reading and how to balance calories with her nutrients. She has tried to gain knowledge through tracking in [address], meeting with a nutritionist in the past but still feels she needs more guidance. Suggested patient to take pictures of labels and brands of foods she's currently using to go over with RD at next visit. Encouraged continue to read labels and track her foods to get overall picture of calorie and nutrition intake. Activity/Exercise:  Patient used to go to the gym but with her work schedule and taking care of two children it has been difficult to do so. 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 When she is working she feels she does a lot of activity for very little weight loss so she gets discouraged. Encouraged patient to continue efforts to add in activity, be aware of all or nothing thinking about activity and to work different body areas with rest days in between to avoid injury. 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  Emphasis of importance of sleep was made and impact it is likely having on her sleep. Patient feels she has to make certain sacrifices for her family but suggested she be aware of the quinteros her health is paying for those sacrifices. Encouraged patient to work with her supports on balancing work and childcare so she can get quality sleep. Mental Health/Wellness:  Patient does identify some stress eating, particularly at work but wanted to do this program so she could work on coping mechanism she could practice before returning to work. She does engage in all or nothing thinking, restrictive eating and diet culture mindset that can sometimes sabotage her efforts. She experiences increased cravings for "forbidden" foods and wants to find a balance in her eating habits, developing a lifestyle rather than being on a life long diet. Discussed all or nothing eating, encouraged to visualize a health path that she's on and all of her choices are forks in that path towards or away from her goals. Suggested she focus on negative messaging she is internalizing with the foods she eats and any slip ups she makes and use them as learning opportunities to make healthier choices at the next opportunity.     Goals:    - improve relationship between self and food, read body cues  - be mindful about internal messaging, be kinder to self in order to avoid self sabotage  - be aware of all or nothing thinking, consider each choice is an opportunity to work towards weight management goals  - continue gaining increased knowledge about label reading, calories and various nutrients to discuss with dietitian   - increase sleep to support health and weight management, be aware of the prices of sacrifices of health that are being made     Next Appointment:  With [facility] on 9/5

## 2023-08-17 NOTE — PATIENT INSTRUCTIONS
- improve relationship between self and food, read body cues  - be mindful about internal messaging, be kinder to self in order to avoid self sabotage  - be aware of all or nothing thinking, consider each choice is an opportunity to work towards weight management goals  - continue gaining increased knowledge about label reading, calories and various nutrients to discuss with dietitian   - increase sleep to support health and weight management, be aware of the prices of sacrifices of health that are being made

## 2023-08-29 ENCOUNTER — TELEPHONE (OUTPATIENT)
Dept: BARIATRICS | Facility: CLINIC | Age: 28
End: 2023-08-29

## 2023-08-29 NOTE — TELEPHONE ENCOUNTER
Contacted patient to touch base regarding Healthy Core program. She no-showed for first two weeks of class. Encouraged patient to reach out if classes no longer fit her schedule to find a more appropriate avenue of support. Will await patient reply.

## 2023-08-31 ENCOUNTER — ANNUAL EXAM (OUTPATIENT)
Dept: OBGYN CLINIC | Facility: CLINIC | Age: 28
End: 2023-08-31
Payer: COMMERCIAL

## 2023-08-31 ENCOUNTER — TELEPHONE (OUTPATIENT)
Dept: HEMATOLOGY ONCOLOGY | Facility: CLINIC | Age: 28
End: 2023-08-31

## 2023-08-31 VITALS — DIASTOLIC BLOOD PRESSURE: 70 MMHG | WEIGHT: 293 LBS | SYSTOLIC BLOOD PRESSURE: 120 MMHG | BODY MASS INDEX: 49.09 KG/M2

## 2023-08-31 DIAGNOSIS — Z01.419 WOMEN'S ANNUAL ROUTINE GYNECOLOGICAL EXAMINATION: Primary | ICD-10-CM

## 2023-08-31 DIAGNOSIS — Z80.41 FAMILY HISTORY OF OVARIAN CANCER: ICD-10-CM

## 2023-08-31 DIAGNOSIS — Z80.3 FAMILY HISTORY OF BREAST CANCER: ICD-10-CM

## 2023-08-31 PROCEDURE — 99395 PREV VISIT EST AGE 18-39: CPT | Performed by: NURSE PRACTITIONER

## 2023-08-31 NOTE — PROGRESS NOTES
Subjective    HPI:     Jorden Coronado is a 28 y.o. female. She is a [place] Para 2, with C/S x 2. Her menstrual cycles are regular and predictable. Her current method of contraception includes withdrawal. She denies issues with intimacy. She denies /GI and Gyn complaints. She feels safe at home. She denies depression/anxiety. Medical, surgical and family history reviewed. She has a family hx of breast cancer and ovarian cancer. She is inquiring on genetic screening. She recently learned that the father that raised her is not her biological father. Her dental care is up-to-date. She tries to eat a healthy diet. She is working with weight management for weight loss. Visit Vitals  /70   Wt 134 kg (295 lb)   LMP 2023   BMI 49.09 kg/m²   OB Status Having periods   Smoking Status Never   BSA 2.33 m²       Gynecologic History    Patient's last menstrual period was 2023.      Last Pap: 2022 Results were: normal      Obstetric and Medical History    OB History    Para Term  AB Living   2 2 2     2   SAB IAB Ectopic Multiple Live Births         0 2      # Outcome Date GA Lbr Terrance/2nd Weight Sex Delivery Anes PTL Lv   2 Term 22 39w1d  3155 g (6 lb 15.3 oz) F CS-LTranv Spinal N KRYSTAL   1 Term 14 40w0d  3600 g (7 lb 15 oz) F CS-Unspec EPI  KRYSTAL      Complications: Fetal Intolerance       Past Medical History:   Diagnosis Date   • Morbid obesity with BMI of 45.0-49.9, adult [facility])        Past Surgical History:   Procedure Laterality Date   •  SECTION, LOW TRANSVERSE  2014    LAST ASSESSED: 1/8/15   • LAPAROTOMY N/A 2022    Procedure: LAPAROTOMY EXPLORATORY, EVACUATION OF HEMOPERITONEUM;  Surgeon: Lucía Sol MD;  Location: [facility];  Service: Gynecology   • AL  DELIVERY ONLY N/A 2022    Procedure:  SECTION () REPEAT;  Surgeon: Lucía Sol MD;  Location: [facility];  Service: Obstetrics       The following portions of the patient's history were reviewed and updated as appropriate: allergies, current medications, past family history, past medical history, past social history, past surgical history and problem list.    Review of Systems    Pertinent items are noted in HPI. Objective    Physical Exam  Constitutional:       Appearance: Normal appearance. She is well-developed. Genitourinary:      Vulva, bladder and urethral meatus normal.      No lesions in the vagina. Right Labia: No rash, tenderness, lesions, skin changes or Bartholin's cyst.     Left Labia: No tenderness, lesions, skin changes, Bartholin's cyst or rash. No labial fusion noted. No inguinal adenopathy present in the right or left side. No vaginal discharge, erythema, tenderness, bleeding or granulation tissue. No vaginal prolapse present. No vaginal atrophy present. Right Adnexa: not tender, not full and no mass present. Left Adnexa: not tender, not full and no mass present. Cervix is not parous. No cervical motion tenderness, discharge, friability, lesion, polyp or nabothian cyst.      Uterus is not enlarged, tender, irregular or prolapsed. No uterine mass detected. Uterus is anteverted. Pelvic exam was performed with patient in the lithotomy position. Breasts:     Breasts are symmetrical.      Right: No inverted nipple, mass, nipple discharge, skin change or tenderness. Left: No inverted nipple, mass, nipple discharge, skin change or tenderness. HENT:      Head: Normocephalic and atraumatic. Neck:      Thyroid: No thyromegaly. Cardiovascular:      Rate and Rhythm: Normal rate and regular rhythm. Heart sounds: Normal heart sounds, S1 normal and S2 normal.   Pulmonary:      Effort: Pulmonary effort is normal.      Breath sounds: Normal breath sounds. Abdominal:      General: Bowel sounds are normal. There is no distension. Palpations: Abdomen is soft. There is no mass. Tenderness: There is no abdominal tenderness. There is no guarding. Hernia: There is no hernia in the left inguinal area or right inguinal area. Musculoskeletal:      Cervical back: Neck supple. Lymphadenopathy:      Cervical: No cervical adenopathy. Upper Body:      Right upper body: No supraclavicular or axillary adenopathy. Left upper body: No supraclavicular or axillary adenopathy. Lower Body: No right inguinal adenopathy. No left inguinal adenopathy. Neurological:      Mental Status: She is alert. Skin:     General: Skin is warm and dry. Findings: No rash. Psychiatric:         Attention and Perception: Attention and perception normal.         Mood and Affect: Mood and affect normal.         Speech: Speech normal.         Behavior: Behavior is cooperative. Thought Content: Thought content normal.         Cognition and Memory: Cognition and memory normal.         Judgment: Judgment normal.   Vitals and nursing note reviewed. Assessment and Plan    Majo Mueller was seen today for gynecologic exam.    Diagnoses and all orders for this visit:    Women's annual routine gynecological examination    Family history of breast cancer  -     Ambulatory Referral to Genetics; Future    Family history of ovarian cancer  -     Ambulatory Referral to Genetics; Future      Patient informed of a Stable GYN exam. A pap smear was performed. I have discussed the importance of exercise and healthy diet as well as adequate intake of calcium and vitamin D. The current ASCCP guidelines were reviewed. The low risk patient will receive pap smear screening every 3 years until the age of 34 and then every 3 to 5 years with HPV co-testing from the ages of 32-69. I emphasized the importance of an annual pelvic and breast exam. A yearly mammogram is recommended for breast cancer screening starting at age 36. Referral for genetic consultation provided.      Results will be released to [employer], if abnormal will call to review and discuss treatment plan. All questions have been answered to her satisfaction. Contraception: coitus interruptus. Follow up in: 1 year or sooner if needed.

## 2023-08-31 NOTE — TELEPHONE ENCOUNTER
I spoke with Rashawn Vargas to schedule their consultation with Cancer Risk and Genetics. Scheduling Outcome: Patient is scheduled for an appointment on 2/13/24 at 1:30pm with Sebastian Chawla     Personal/Family History Related to Appointment:     Personal History of Cancer: Patient reports no personal history of cancer    Family History of Cancer: Patient reports family history of Ovarian and Breast Cancer  Mother Ovarian   M grandmother Breast      Is patient of [address] [place] [address],  [address]?: No    History of Genetic Testing:  Personal History of Genetic Testing: Patient report no personal history of Genetic Testing. Family History of Genetic Testing: Patient reports that member(s) of their family have had Genetic Testing. Details: Half sister - all results came back negative    Progeny:  Is patient able to complete our family history questionnaire on a computer?:  Yes    Patient's preferred e-mail address: [email or web address]

## 2023-09-04 LAB
CYTOLOGIST CVX/VAG CYTO: NORMAL
DX ICD CODE: NORMAL
OTHER STN SPEC: NORMAL
OTHER STN SPEC: NORMAL
PATH REPORT.FINAL DX SPEC: NORMAL
SL AMB NOTE:: NORMAL
SL AMB SPECIMEN ADEQUACY: NORMAL
SL AMB TEST METHODOLOGY: NORMAL

## 2023-09-05 ENCOUNTER — CLINICAL SUPPORT (OUTPATIENT)
Dept: BARIATRICS | Facility: CLINIC | Age: 28
End: 2023-09-05

## 2023-09-05 ENCOUNTER — OFFICE VISIT (OUTPATIENT)
Dept: BARIATRICS | Facility: CLINIC | Age: 28
End: 2023-09-05

## 2023-09-05 VITALS — BODY MASS INDEX: 48.82 KG/M2 | HEIGHT: 65 IN | WEIGHT: 293 LBS

## 2023-09-05 VITALS — HEIGHT: 65 IN | WEIGHT: 293 LBS | BODY MASS INDEX: 48.82 KG/M2

## 2023-09-05 DIAGNOSIS — R63.5 ABNORMAL WEIGHT GAIN: Primary | ICD-10-CM

## 2023-09-05 PROCEDURE — RECHECK

## 2023-09-05 NOTE — PROGRESS NOTES
Weight Management Medical Nutrition Assessment  Pita Thompson is here today for Healthy Core Month 1 f/u. Current Weight: 300.8#. Patient has gained 6.5# x 3 weeks. She reports she is struggling with interval eating. Determined time was her biggest barrier. Discussed easy-to-prepare meal ideas such as a protein shake, [place] yogurt, protein cereal or just-crack-an-egg cups. Also encouraged patient to try and wake up a few minutes earlier so she doesn't feel like she is 'scarf-ing something down' on her way out the door. Reviewed benefits of interval eating. She reports her grazing has reduced and she is watching serving sizes. She has utilized the infinity hoop for 30 mins w/ 10-15 mins of additional exercise. She feels something will happen that throws her out of her routine and she will 'start again Monday'. Discussed further her all-or-nothing thinking and encouraged patient to think of each day as a fork in the road, where she takes the road towards or away from her goal. Emphasized that each meal, snack, and exercise opportunity is a choice to get back onto her road to prevent that "on-or-off" approach to exercise and food choices. She shared that she feels overwhelmed with several areas to work on so today we determined that interval eating and exercise (infinity hoop w/ 10-15 mins additional exercise 3x/week) were her two main focuses until next RD f/u.     Motivator for weight loss: teaching her daughter how to life a healthy life and what healthy meals look and taste like    Likes: nuts, cheese, peanut butter, trying with [place] yogurt, hummus    Patient seen by Medical Provider in past 6 months:  yes  Requested to schedule appointment with Medical Provider: No    Anthropometric Measurements  Start Weight (#): 294.3# (8/15/23)  Current Weight (#): 300.8#  TBW % Change from start weight: --%  Ideal Body Weight (#): 125# (65")  Goal Weight (#): 180-190#    Weight Loss History  Previous weight loss attempts: Commercial Programs ([employer] Watchers, Venancio Lee, etc.)  Exercise, Physician Supervised Weight Loss Program, Prescription Weight Loss Medication  Self Created Diets (Portion Control, Healthy Food Choices, etc.)    Food and Nutrition Related History  Wake up: 2 am   Bed Time: 11 pm   Sleep quality: not well, has young kids and is with children until  gets home at 11 pm   Works 3 am-3 pm (however, currently on maternity leave)    Dietary Recall  Breakfast: -- Or previously would have a premier protein shake before work Or this morning had 2 eggs, with 1 Tbsp butter, 2 slices low-alexis bread   Snack (10 am): vending machine snacks (stress eats when at work)   Lunch (1-2 pm): sandwich Or leftovers  Snack: --  Dinner (5-7 pm): protein (chicken, beef, meatloaf)/carb (potatoes, noodles, rice)/corn or other veggie Or dining out   Snack: -- Or crackers Or may binge eat at this time     Beverages: water, diet soda (2-3 cans/day), occasional starbucks   Volume of beverage intake: 40-80 oz water (her goal is 60 oz water)      Weekends: has breakfast (pancakes, Occitan toast, with eggs), will skip lunch   Cravings: chocolate   Trouble area of day: evenings     Frequency of Eating out: 3x/week  Food restrictions: none, dislikes seafood  Cooking: self  Food Shopping: self     Occupation: CNA (on feet at work, 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)    Physical Activity  Activity: No formal routine (steps at work) [employer] treadmill, infinity hoop, and stationary bike]   Frequency:occasionally  Physical limitations/barriers to exercise: none     Estimated Needs  Energy  SECA: BMR: 2,188 X 1.4 -1000 = 3,063 kcal     [employer] Energy Needs (needs at 294.3#)  BMR: 2,066 kcal  Maintenance calories (sedentary): 2,479 kcal  1-2#/week loss (sedentary): 1,479-1,979 kcal  1-2#/week loss (light activity): 1,840-2,340 kcal    Protein: 68-85 grams (1.2-1.5g/kg IBW)  Fluid: 66 ounces (35mL/kg IBW)    Nutrition Diagnosis  Yes;     Overweight/obesity related to Excess energy intake as evidenced by BMI more than normative standard for age and sex (obesity-grade III 36+)     Nutrition Intervention    Nutrition Prescription  Calories: 1,600-1,900 kcal  Protein: 68-85 g  Fluid: 66+ ounces    Meal Plan (Momo/Pro)  Breakfast: 300-350/15-30  Snack: 100-150/5+  Lunch: 500/25-35  Snack: 100-150/5+  Dinner:   Snack:     Nutrition Education  Healthy Core Manual   Calorie controlled menu  Lean protein food choices  Healthy snack options  Food journaling tips    Nutrition Counseling  Strategies: meal planning, portion sizes, healthy snack choices, hydration, fiber intake, protein intake, exercise, food logging    Monitoring and Evaluation:    Evaluation criteria  Energy Intake  Meet protein needs  Maintain adequate hydration  Monitor weekly weight  Meal planning/preparation  Food journal   Decreased portions at mealtimes and snacks  Physical activity     Barriers to learning:none  Readiness to change: Preparation:  (Getting ready to change)   Comprehension: good  Expected Compliance: good

## 2025-08-05 ENCOUNTER — OFFICE VISIT (OUTPATIENT)
Dept: URGENT CARE | Facility: CLINIC | Age: 30
End: 2025-08-05

## 2025-08-05 VITALS
SYSTOLIC BLOOD PRESSURE: 134 MMHG | HEART RATE: 86 BPM | HEIGHT: 65 IN | BODY MASS INDEX: 48.82 KG/M2 | WEIGHT: 293 LBS | RESPIRATION RATE: 18 BRPM | TEMPERATURE: 97.9 F | OXYGEN SATURATION: 97 % | DIASTOLIC BLOOD PRESSURE: 80 MMHG

## 2025-08-05 DIAGNOSIS — Z02.1 PHYSICAL EXAM, PRE-EMPLOYMENT: Primary | ICD-10-CM

## 2025-08-05 PROCEDURE — 99499 UNLISTED E&M SERVICE: CPT | Performed by: PHYSICIAN ASSISTANT

## (undated) DEVICE — WOUND RETRACTOR AND PROTECTOR: Brand: ALEXIS O WOUND PROTECTOR-RETRACTOR

## (undated) DEVICE — TELFA NON-ADHERENT ABSORBENT DRESSING: Brand: TELFA

## (undated) DEVICE — Device

## (undated) DEVICE — SUT VICRYL 0 CTX 36 IN J978H

## (undated) DEVICE — SUT PLAIN 2-0 CTX 27 IN 872H

## (undated) DEVICE — SURGIFOAM 7 X 12 SPONGE ABS

## (undated) DEVICE — LARGE, DISPOSABLE ALEXIS O C-SECTION PROTECTOR - RETRACTOR: Brand: ALEXIS ® O C-SECTION PROTECTOR - RETRACTOR

## (undated) DEVICE — GLOVE PI ULTRA TOUCH SZ.7.5

## (undated) DEVICE — SURGIFOAM 8.5 X 12.5

## (undated) DEVICE — GLOVE INDICATOR PI UNDERGLOVE SZ 7.5 BLUE

## (undated) DEVICE — PACK C-SECTION PBDS

## (undated) DEVICE — SUT VICRYL 0 CT-1 36 IN J946H

## (undated) DEVICE — HEMOSTAT POWDER ADSORB SURGICEL 3GM

## (undated) DEVICE — ADHESIVE SKIN HIGH VISCOSITY EXOFIN MICRO 0.5ML

## (undated) DEVICE — SKIN MARKER DUAL TIP WITH RULER CAP, FLEXIBLE RULER AND LABELS: Brand: DEVON